# Patient Record
Sex: MALE | Race: WHITE | Employment: OTHER | URBAN - METROPOLITAN AREA
[De-identification: names, ages, dates, MRNs, and addresses within clinical notes are randomized per-mention and may not be internally consistent; named-entity substitution may affect disease eponyms.]

---

## 2017-01-10 ENCOUNTER — OFFICE VISIT (OUTPATIENT)
Dept: INTERNAL MEDICINE CLINIC | Age: 77
End: 2017-01-10

## 2017-01-10 VITALS
HEART RATE: 66 BPM | WEIGHT: 191.2 LBS | RESPIRATION RATE: 16 BRPM | DIASTOLIC BLOOD PRESSURE: 70 MMHG | BODY MASS INDEX: 28.32 KG/M2 | HEIGHT: 69 IN | SYSTOLIC BLOOD PRESSURE: 120 MMHG

## 2017-01-10 DIAGNOSIS — E78.00 PURE HYPERCHOLESTEROLEMIA: ICD-10-CM

## 2017-01-10 DIAGNOSIS — R73.9 HYPERGLYCEMIA: ICD-10-CM

## 2017-01-10 DIAGNOSIS — I25.84 CORONARY ARTERY DISEASE DUE TO CALCIFIED CORONARY LESION: Primary | ICD-10-CM

## 2017-01-10 DIAGNOSIS — I25.10 CORONARY ARTERY DISEASE DUE TO CALCIFIED CORONARY LESION: Primary | ICD-10-CM

## 2017-01-10 LAB
A/G RATIO: 1.7 (ref 1.1–2.2)
ALBUMIN SERPL-MCNC: 4.5 G/DL (ref 3.4–5)
ALP BLD-CCNC: 65 U/L (ref 40–129)
ALT SERPL-CCNC: 10 U/L (ref 10–40)
ANION GAP SERPL CALCULATED.3IONS-SCNC: 13 MMOL/L (ref 3–16)
AST SERPL-CCNC: 16 U/L (ref 15–37)
BILIRUB SERPL-MCNC: 0.5 MG/DL (ref 0–1)
BUN BLDV-MCNC: 18 MG/DL (ref 7–20)
CALCIUM SERPL-MCNC: 10.1 MG/DL (ref 8.3–10.6)
CHLORIDE BLD-SCNC: 102 MMOL/L (ref 99–110)
CO2: 26 MMOL/L (ref 21–32)
CREAT SERPL-MCNC: 0.8 MG/DL (ref 0.8–1.3)
GFR AFRICAN AMERICAN: >60
GFR NON-AFRICAN AMERICAN: >60
GLOBULIN: 2.7 G/DL
GLUCOSE BLD-MCNC: 112 MG/DL (ref 70–99)
LDL CHOLESTEROL DIRECT: 73 MG/DL
POTASSIUM SERPL-SCNC: 4.6 MMOL/L (ref 3.5–5.1)
SODIUM BLD-SCNC: 141 MMOL/L (ref 136–145)
TOTAL PROTEIN: 7.2 G/DL (ref 6.4–8.2)

## 2017-01-10 PROCEDURE — 99213 OFFICE O/P EST LOW 20 MIN: CPT | Performed by: INTERNAL MEDICINE

## 2017-01-10 ASSESSMENT — ENCOUNTER SYMPTOMS
WHEEZING: 0
SHORTNESS OF BREATH: 0

## 2017-04-24 ENCOUNTER — OFFICE VISIT (OUTPATIENT)
Dept: INTERNAL MEDICINE CLINIC | Age: 77
End: 2017-04-24

## 2017-04-24 VITALS
BODY MASS INDEX: 28.68 KG/M2 | WEIGHT: 193.6 LBS | HEART RATE: 74 BPM | RESPIRATION RATE: 16 BRPM | DIASTOLIC BLOOD PRESSURE: 70 MMHG | HEIGHT: 69 IN | SYSTOLIC BLOOD PRESSURE: 130 MMHG

## 2017-04-24 DIAGNOSIS — R41.3 MEMORY LOSS: Primary | ICD-10-CM

## 2017-04-24 DIAGNOSIS — E78.00 PURE HYPERCHOLESTEROLEMIA: ICD-10-CM

## 2017-04-24 LAB
TSH SERPL DL<=0.05 MIU/L-ACNC: 2.06 UIU/ML (ref 0.27–4.2)
VITAMIN B-12: 517 PG/ML (ref 211–911)

## 2017-04-24 PROCEDURE — 4040F PNEUMOC VAC/ADMIN/RCVD: CPT | Performed by: INTERNAL MEDICINE

## 2017-04-24 PROCEDURE — G8598 ASA/ANTIPLAT THER USED: HCPCS | Performed by: INTERNAL MEDICINE

## 2017-04-24 PROCEDURE — 1036F TOBACCO NON-USER: CPT | Performed by: INTERNAL MEDICINE

## 2017-04-24 PROCEDURE — G8427 DOCREV CUR MEDS BY ELIG CLIN: HCPCS | Performed by: INTERNAL MEDICINE

## 2017-04-24 PROCEDURE — G8420 CALC BMI NORM PARAMETERS: HCPCS | Performed by: INTERNAL MEDICINE

## 2017-04-24 PROCEDURE — 99213 OFFICE O/P EST LOW 20 MIN: CPT | Performed by: INTERNAL MEDICINE

## 2017-04-24 PROCEDURE — 3017F COLORECTAL CA SCREEN DOC REV: CPT | Performed by: INTERNAL MEDICINE

## 2017-04-24 PROCEDURE — 1123F ACP DISCUSS/DSCN MKR DOCD: CPT | Performed by: INTERNAL MEDICINE

## 2017-04-24 RX ORDER — ROSUVASTATIN CALCIUM 20 MG/1
20 TABLET, COATED ORAL NIGHTLY
Qty: 30 TABLET | Refills: 3 | Status: SHIPPED | OUTPATIENT
Start: 2017-04-24 | End: 2017-08-18 | Stop reason: SDUPTHER

## 2017-04-24 RX ORDER — ROSUVASTATIN CALCIUM 20 MG/1
TABLET, COATED ORAL
Qty: 90 TABLET | Refills: 3 | OUTPATIENT
Start: 2017-04-24

## 2017-05-04 ENCOUNTER — HOSPITAL ENCOUNTER (OUTPATIENT)
Dept: MRI IMAGING | Age: 77
Discharge: HOME | End: 2017-05-04
Attending: INTERNAL MEDICINE | Admitting: INTERNAL MEDICINE

## 2017-05-04 DIAGNOSIS — R41.3 MEMORY LOSS: ICD-10-CM

## 2017-05-04 DIAGNOSIS — R41.3 OTHER AMNESIA: ICD-10-CM

## 2017-05-09 ENCOUNTER — OFFICE VISIT (OUTPATIENT)
Dept: INTERNAL MEDICINE CLINIC | Age: 77
End: 2017-05-09

## 2017-05-09 VITALS
HEIGHT: 69 IN | WEIGHT: 191.2 LBS | RESPIRATION RATE: 16 BRPM | HEART RATE: 70 BPM | DIASTOLIC BLOOD PRESSURE: 70 MMHG | SYSTOLIC BLOOD PRESSURE: 130 MMHG | BODY MASS INDEX: 28.32 KG/M2

## 2017-05-09 DIAGNOSIS — F03.A0 MILD DEMENTIA: Primary | ICD-10-CM

## 2017-05-09 PROCEDURE — 1123F ACP DISCUSS/DSCN MKR DOCD: CPT | Performed by: INTERNAL MEDICINE

## 2017-05-09 PROCEDURE — G8427 DOCREV CUR MEDS BY ELIG CLIN: HCPCS | Performed by: INTERNAL MEDICINE

## 2017-05-09 PROCEDURE — G8420 CALC BMI NORM PARAMETERS: HCPCS | Performed by: INTERNAL MEDICINE

## 2017-05-09 PROCEDURE — 4040F PNEUMOC VAC/ADMIN/RCVD: CPT | Performed by: INTERNAL MEDICINE

## 2017-05-09 PROCEDURE — 3017F COLORECTAL CA SCREEN DOC REV: CPT | Performed by: INTERNAL MEDICINE

## 2017-05-09 PROCEDURE — 99213 OFFICE O/P EST LOW 20 MIN: CPT | Performed by: INTERNAL MEDICINE

## 2017-05-09 PROCEDURE — 1036F TOBACCO NON-USER: CPT | Performed by: INTERNAL MEDICINE

## 2017-05-09 PROCEDURE — G8598 ASA/ANTIPLAT THER USED: HCPCS | Performed by: INTERNAL MEDICINE

## 2017-05-09 RX ORDER — DONEPEZIL HYDROCHLORIDE 5 MG/1
5 TABLET, FILM COATED ORAL NIGHTLY
Qty: 30 TABLET | Refills: 1 | Status: SHIPPED | OUTPATIENT
Start: 2017-05-09 | End: 2017-06-09 | Stop reason: SDUPTHER

## 2017-06-09 ENCOUNTER — OFFICE VISIT (OUTPATIENT)
Dept: INTERNAL MEDICINE CLINIC | Age: 77
End: 2017-06-09

## 2017-06-09 VITALS
SYSTOLIC BLOOD PRESSURE: 104 MMHG | RESPIRATION RATE: 16 BRPM | HEART RATE: 66 BPM | WEIGHT: 190.6 LBS | HEIGHT: 69 IN | DIASTOLIC BLOOD PRESSURE: 60 MMHG | BODY MASS INDEX: 28.23 KG/M2

## 2017-06-09 DIAGNOSIS — F02.80 LATE ONSET ALZHEIMER'S DISEASE WITHOUT BEHAVIORAL DISTURBANCE (HCC): ICD-10-CM

## 2017-06-09 DIAGNOSIS — G30.1 LATE ONSET ALZHEIMER'S DISEASE WITHOUT BEHAVIORAL DISTURBANCE (HCC): ICD-10-CM

## 2017-06-09 PROCEDURE — 1123F ACP DISCUSS/DSCN MKR DOCD: CPT | Performed by: INTERNAL MEDICINE

## 2017-06-09 PROCEDURE — 99213 OFFICE O/P EST LOW 20 MIN: CPT | Performed by: INTERNAL MEDICINE

## 2017-06-09 PROCEDURE — 1036F TOBACCO NON-USER: CPT | Performed by: INTERNAL MEDICINE

## 2017-06-09 PROCEDURE — G8427 DOCREV CUR MEDS BY ELIG CLIN: HCPCS | Performed by: INTERNAL MEDICINE

## 2017-06-09 PROCEDURE — 3017F COLORECTAL CA SCREEN DOC REV: CPT | Performed by: INTERNAL MEDICINE

## 2017-06-09 PROCEDURE — G8598 ASA/ANTIPLAT THER USED: HCPCS | Performed by: INTERNAL MEDICINE

## 2017-06-09 PROCEDURE — G8420 CALC BMI NORM PARAMETERS: HCPCS | Performed by: INTERNAL MEDICINE

## 2017-06-09 PROCEDURE — 4040F PNEUMOC VAC/ADMIN/RCVD: CPT | Performed by: INTERNAL MEDICINE

## 2017-06-09 RX ORDER — DONEPEZIL HYDROCHLORIDE 10 MG/1
10 TABLET, FILM COATED ORAL NIGHTLY
Qty: 30 TABLET | Refills: 2 | Status: SHIPPED | OUTPATIENT
Start: 2017-06-09 | End: 2017-09-25 | Stop reason: SDUPTHER

## 2017-07-10 ENCOUNTER — OFFICE VISIT (OUTPATIENT)
Dept: INTERNAL MEDICINE CLINIC | Age: 77
End: 2017-07-10

## 2017-07-10 VITALS
DIASTOLIC BLOOD PRESSURE: 82 MMHG | HEIGHT: 69 IN | RESPIRATION RATE: 16 BRPM | WEIGHT: 190 LBS | BODY MASS INDEX: 28.14 KG/M2 | HEART RATE: 62 BPM | SYSTOLIC BLOOD PRESSURE: 132 MMHG

## 2017-07-10 DIAGNOSIS — I25.84 CORONARY ARTERY DISEASE DUE TO CALCIFIED CORONARY LESION: ICD-10-CM

## 2017-07-10 DIAGNOSIS — E78.00 PURE HYPERCHOLESTEROLEMIA: ICD-10-CM

## 2017-07-10 DIAGNOSIS — R73.9 HYPERGLYCEMIA: ICD-10-CM

## 2017-07-10 DIAGNOSIS — Z23 NEED FOR TETANUS BOOSTER: ICD-10-CM

## 2017-07-10 DIAGNOSIS — G30.1 LATE ONSET ALZHEIMER'S DISEASE WITHOUT BEHAVIORAL DISTURBANCE (HCC): Primary | ICD-10-CM

## 2017-07-10 DIAGNOSIS — F02.80 LATE ONSET ALZHEIMER'S DISEASE WITHOUT BEHAVIORAL DISTURBANCE (HCC): Primary | ICD-10-CM

## 2017-07-10 DIAGNOSIS — I25.10 CORONARY ARTERY DISEASE DUE TO CALCIFIED CORONARY LESION: ICD-10-CM

## 2017-07-10 LAB
A/G RATIO: 1.8 (ref 1.1–2.2)
ALBUMIN SERPL-MCNC: 4.5 G/DL (ref 3.4–5)
ALP BLD-CCNC: 75 U/L (ref 40–129)
ALT SERPL-CCNC: 15 U/L (ref 10–40)
ANION GAP SERPL CALCULATED.3IONS-SCNC: 14 MMOL/L (ref 3–16)
AST SERPL-CCNC: 20 U/L (ref 15–37)
BILIRUB SERPL-MCNC: 0.6 MG/DL (ref 0–1)
BUN BLDV-MCNC: 19 MG/DL (ref 7–20)
CALCIUM SERPL-MCNC: 10.2 MG/DL (ref 8.3–10.6)
CHLORIDE BLD-SCNC: 103 MMOL/L (ref 99–110)
CO2: 24 MMOL/L (ref 21–32)
CREAT SERPL-MCNC: 0.8 MG/DL (ref 0.8–1.3)
GFR AFRICAN AMERICAN: >60
GFR NON-AFRICAN AMERICAN: >60
GLOBULIN: 2.5 G/DL
GLUCOSE BLD-MCNC: 122 MG/DL (ref 70–99)
LDL CHOLESTEROL DIRECT: 62 MG/DL
POTASSIUM SERPL-SCNC: 4.7 MMOL/L (ref 3.5–5.1)
SODIUM BLD-SCNC: 141 MMOL/L (ref 136–145)
TOTAL PROTEIN: 7 G/DL (ref 6.4–8.2)

## 2017-07-10 PROCEDURE — 99214 OFFICE O/P EST MOD 30 MIN: CPT | Performed by: INTERNAL MEDICINE

## 2017-07-10 PROCEDURE — G8427 DOCREV CUR MEDS BY ELIG CLIN: HCPCS | Performed by: INTERNAL MEDICINE

## 2017-07-10 PROCEDURE — 3017F COLORECTAL CA SCREEN DOC REV: CPT | Performed by: INTERNAL MEDICINE

## 2017-07-10 PROCEDURE — G8419 CALC BMI OUT NRM PARAM NOF/U: HCPCS | Performed by: INTERNAL MEDICINE

## 2017-07-10 PROCEDURE — 1036F TOBACCO NON-USER: CPT | Performed by: INTERNAL MEDICINE

## 2017-07-10 PROCEDURE — 90471 IMMUNIZATION ADMIN: CPT | Performed by: INTERNAL MEDICINE

## 2017-07-10 PROCEDURE — G8598 ASA/ANTIPLAT THER USED: HCPCS | Performed by: INTERNAL MEDICINE

## 2017-07-10 PROCEDURE — 1123F ACP DISCUSS/DSCN MKR DOCD: CPT | Performed by: INTERNAL MEDICINE

## 2017-07-10 PROCEDURE — 90715 TDAP VACCINE 7 YRS/> IM: CPT | Performed by: INTERNAL MEDICINE

## 2017-07-10 PROCEDURE — 4040F PNEUMOC VAC/ADMIN/RCVD: CPT | Performed by: INTERNAL MEDICINE

## 2017-07-10 RX ORDER — MEMANTINE HYDROCHLORIDE 5 MG/1
5 TABLET ORAL 2 TIMES DAILY
Qty: 60 TABLET | Refills: 3 | Status: SHIPPED | OUTPATIENT
Start: 2017-07-10 | End: 2017-09-25 | Stop reason: SDUPTHER

## 2017-07-10 ASSESSMENT — PATIENT HEALTH QUESTIONNAIRE - PHQ9
SUM OF ALL RESPONSES TO PHQ9 QUESTIONS 1 & 2: 0
SUM OF ALL RESPONSES TO PHQ QUESTIONS 1-9: 0
2. FEELING DOWN, DEPRESSED OR HOPELESS: 0
1. LITTLE INTEREST OR PLEASURE IN DOING THINGS: 0

## 2017-08-18 RX ORDER — DONEPEZIL HYDROCHLORIDE 5 MG/1
TABLET, FILM COATED ORAL
Qty: 30 TABLET | Refills: 0 | Status: SHIPPED | OUTPATIENT
Start: 2017-08-18 | End: 2017-09-25

## 2017-08-20 RX ORDER — ROSUVASTATIN CALCIUM 20 MG/1
TABLET, COATED ORAL
Qty: 30 TABLET | Refills: 5 | Status: SHIPPED | OUTPATIENT
Start: 2017-08-20 | End: 2017-09-25 | Stop reason: SDUPTHER

## 2017-09-02 ENCOUNTER — HOSPITAL ENCOUNTER (OUTPATIENT)
Dept: OTHER | Age: 77
Discharge: HOME | End: 2017-09-02
Attending: UROLOGY | Admitting: UROLOGY

## 2017-09-02 LAB
ANION GAP SERPL CALCULATED.3IONS-SCNC: 13 MMOL/L (ref 3–16)
BUN BLDV-MCNC: 14 MG/DL (ref 7–20)
CALCIUM SERPL-MCNC: 10 MG/DL (ref 8.3–10.6)
CHLORIDE BLD-SCNC: 102 MMOL/L (ref 99–110)
CO2: 25 MMOL/L (ref 21–32)
CREAT SERPL-MCNC: 0.7 MG/DL (ref 0.8–1.3)
GFR AFRICAN AMERICAN: >60
GFR NON-AFRICAN AMERICAN: >60
GLUCOSE BLD-MCNC: 95 MG/DL (ref 70–99)
POTASSIUM SERPL-SCNC: 3.9 MMOL/L (ref 3.5–5.1)
PROSTATE SPECIFIC ANTIGEN: <0.01 NG/ML (ref 0–4)
SODIUM BLD-SCNC: 140 MMOL/L (ref 136–145)

## 2017-09-25 ENCOUNTER — OFFICE VISIT (OUTPATIENT)
Dept: INTERNAL MEDICINE CLINIC | Age: 77
End: 2017-09-25

## 2017-09-25 VITALS
DIASTOLIC BLOOD PRESSURE: 62 MMHG | BODY MASS INDEX: 28.41 KG/M2 | WEIGHT: 191.8 LBS | HEIGHT: 69 IN | SYSTOLIC BLOOD PRESSURE: 120 MMHG | RESPIRATION RATE: 16 BRPM | HEART RATE: 68 BPM

## 2017-09-25 DIAGNOSIS — G30.1 LATE ONSET ALZHEIMER'S DISEASE WITHOUT BEHAVIORAL DISTURBANCE (HCC): Primary | ICD-10-CM

## 2017-09-25 DIAGNOSIS — F02.80 LATE ONSET ALZHEIMER'S DISEASE WITHOUT BEHAVIORAL DISTURBANCE (HCC): Primary | ICD-10-CM

## 2017-09-25 DIAGNOSIS — R53.83 FATIGUE, UNSPECIFIED TYPE: ICD-10-CM

## 2017-09-25 PROCEDURE — G8427 DOCREV CUR MEDS BY ELIG CLIN: HCPCS | Performed by: INTERNAL MEDICINE

## 2017-09-25 PROCEDURE — 1036F TOBACCO NON-USER: CPT | Performed by: INTERNAL MEDICINE

## 2017-09-25 PROCEDURE — 4040F PNEUMOC VAC/ADMIN/RCVD: CPT | Performed by: INTERNAL MEDICINE

## 2017-09-25 PROCEDURE — G8417 CALC BMI ABV UP PARAM F/U: HCPCS | Performed by: INTERNAL MEDICINE

## 2017-09-25 PROCEDURE — 99213 OFFICE O/P EST LOW 20 MIN: CPT | Performed by: INTERNAL MEDICINE

## 2017-09-25 PROCEDURE — 1123F ACP DISCUSS/DSCN MKR DOCD: CPT | Performed by: INTERNAL MEDICINE

## 2017-09-25 PROCEDURE — G8598 ASA/ANTIPLAT THER USED: HCPCS | Performed by: INTERNAL MEDICINE

## 2017-09-25 RX ORDER — ROSUVASTATIN CALCIUM 20 MG/1
TABLET, COATED ORAL
Qty: 90 TABLET | Refills: 3 | Status: SHIPPED | OUTPATIENT
Start: 2017-09-25 | End: 2018-12-07 | Stop reason: SDUPTHER

## 2017-09-25 RX ORDER — DONEPEZIL HYDROCHLORIDE 10 MG/1
10 TABLET, FILM COATED ORAL NIGHTLY
Qty: 90 TABLET | Refills: 3 | Status: SHIPPED | OUTPATIENT
Start: 2017-09-25 | End: 2018-01-09

## 2017-09-25 RX ORDER — MEMANTINE HYDROCHLORIDE 10 MG/1
10 TABLET ORAL 2 TIMES DAILY
Qty: 60 TABLET | Refills: 1 | Status: SHIPPED | OUTPATIENT
Start: 2017-09-25 | End: 2017-10-10 | Stop reason: SDUPTHER

## 2017-10-10 ENCOUNTER — TELEPHONE (OUTPATIENT)
Dept: INTERNAL MEDICINE CLINIC | Age: 77
End: 2017-10-10

## 2017-10-10 ENCOUNTER — OFFICE VISIT (OUTPATIENT)
Dept: INTERNAL MEDICINE CLINIC | Age: 77
End: 2017-10-10

## 2017-10-10 VITALS
RESPIRATION RATE: 16 BRPM | HEART RATE: 70 BPM | SYSTOLIC BLOOD PRESSURE: 140 MMHG | DIASTOLIC BLOOD PRESSURE: 70 MMHG | TEMPERATURE: 98.1 F | HEIGHT: 69 IN | BODY MASS INDEX: 27.93 KG/M2 | WEIGHT: 188.6 LBS

## 2017-10-10 DIAGNOSIS — J40 BRONCHITIS: Primary | ICD-10-CM

## 2017-10-10 PROCEDURE — G8598 ASA/ANTIPLAT THER USED: HCPCS | Performed by: INTERNAL MEDICINE

## 2017-10-10 PROCEDURE — 1123F ACP DISCUSS/DSCN MKR DOCD: CPT | Performed by: INTERNAL MEDICINE

## 2017-10-10 PROCEDURE — G8417 CALC BMI ABV UP PARAM F/U: HCPCS | Performed by: INTERNAL MEDICINE

## 2017-10-10 PROCEDURE — G8427 DOCREV CUR MEDS BY ELIG CLIN: HCPCS | Performed by: INTERNAL MEDICINE

## 2017-10-10 PROCEDURE — 99213 OFFICE O/P EST LOW 20 MIN: CPT | Performed by: INTERNAL MEDICINE

## 2017-10-10 PROCEDURE — G8484 FLU IMMUNIZE NO ADMIN: HCPCS | Performed by: INTERNAL MEDICINE

## 2017-10-10 PROCEDURE — 4040F PNEUMOC VAC/ADMIN/RCVD: CPT | Performed by: INTERNAL MEDICINE

## 2017-10-10 PROCEDURE — 1036F TOBACCO NON-USER: CPT | Performed by: INTERNAL MEDICINE

## 2017-10-10 RX ORDER — DOXYCYCLINE HYCLATE 100 MG
100 TABLET ORAL 2 TIMES DAILY
Qty: 14 TABLET | Refills: 0 | Status: SHIPPED | OUTPATIENT
Start: 2017-10-10 | End: 2017-10-17

## 2017-10-10 RX ORDER — MEMANTINE HYDROCHLORIDE 5 MG/1
5 TABLET ORAL 2 TIMES DAILY
Qty: 60 TABLET | Refills: 5 | Status: SHIPPED | OUTPATIENT
Start: 2017-10-10 | End: 2018-01-09

## 2018-01-09 ENCOUNTER — OFFICE VISIT (OUTPATIENT)
Dept: INTERNAL MEDICINE CLINIC | Age: 78
End: 2018-01-09

## 2018-01-09 VITALS
HEIGHT: 69 IN | BODY MASS INDEX: 29 KG/M2 | DIASTOLIC BLOOD PRESSURE: 80 MMHG | SYSTOLIC BLOOD PRESSURE: 132 MMHG | HEART RATE: 66 BPM | WEIGHT: 195.8 LBS | RESPIRATION RATE: 16 BRPM

## 2018-01-09 DIAGNOSIS — I25.10 CORONARY ARTERY DISEASE DUE TO CALCIFIED CORONARY LESION: Primary | ICD-10-CM

## 2018-01-09 DIAGNOSIS — I25.84 CORONARY ARTERY DISEASE DUE TO CALCIFIED CORONARY LESION: Primary | ICD-10-CM

## 2018-01-09 DIAGNOSIS — E78.00 PURE HYPERCHOLESTEROLEMIA: ICD-10-CM

## 2018-01-09 DIAGNOSIS — G30.1 LATE ONSET ALZHEIMER'S DISEASE WITHOUT BEHAVIORAL DISTURBANCE (HCC): ICD-10-CM

## 2018-01-09 DIAGNOSIS — F02.80 LATE ONSET ALZHEIMER'S DISEASE WITHOUT BEHAVIORAL DISTURBANCE (HCC): ICD-10-CM

## 2018-01-09 LAB
A/G RATIO: 1.5 (ref 1.1–2.2)
ALBUMIN SERPL-MCNC: 4.1 G/DL (ref 3.4–5)
ALP BLD-CCNC: 68 U/L (ref 40–129)
ALT SERPL-CCNC: 26 U/L (ref 10–40)
ANION GAP SERPL CALCULATED.3IONS-SCNC: 14 MMOL/L (ref 3–16)
AST SERPL-CCNC: 25 U/L (ref 15–37)
BASOPHILS ABSOLUTE: 0 K/UL (ref 0–0.2)
BASOPHILS RELATIVE PERCENT: 0.7 %
BILIRUB SERPL-MCNC: 0.3 MG/DL (ref 0–1)
BUN BLDV-MCNC: 20 MG/DL (ref 7–20)
CALCIUM SERPL-MCNC: 10 MG/DL (ref 8.3–10.6)
CHLORIDE BLD-SCNC: 102 MMOL/L (ref 99–110)
CHOLESTEROL, TOTAL: 102 MG/DL (ref 0–199)
CO2: 29 MMOL/L (ref 21–32)
CREAT SERPL-MCNC: 0.7 MG/DL (ref 0.8–1.3)
EOSINOPHILS ABSOLUTE: 0.4 K/UL (ref 0–0.6)
EOSINOPHILS RELATIVE PERCENT: 5.7 %
GFR AFRICAN AMERICAN: >60
GFR NON-AFRICAN AMERICAN: >60
GLOBULIN: 2.7 G/DL
GLUCOSE BLD-MCNC: 105 MG/DL (ref 70–99)
HCT VFR BLD CALC: 39.9 % (ref 40.5–52.5)
HDLC SERPL-MCNC: 40 MG/DL (ref 40–60)
HEMOGLOBIN: 13.6 G/DL (ref 13.5–17.5)
LDL CHOLESTEROL CALCULATED: 43 MG/DL
LYMPHOCYTES ABSOLUTE: 1.7 K/UL (ref 1–5.1)
LYMPHOCYTES RELATIVE PERCENT: 26.3 %
MCH RBC QN AUTO: 29.4 PG (ref 26–34)
MCHC RBC AUTO-ENTMCNC: 34 G/DL (ref 31–36)
MCV RBC AUTO: 86.6 FL (ref 80–100)
MONOCYTES ABSOLUTE: 0.4 K/UL (ref 0–1.3)
MONOCYTES RELATIVE PERCENT: 6.9 %
NEUTROPHILS ABSOLUTE: 3.9 K/UL (ref 1.7–7.7)
NEUTROPHILS RELATIVE PERCENT: 60.4 %
PDW BLD-RTO: 12.6 % (ref 12.4–15.4)
PLATELET # BLD: 238 K/UL (ref 135–450)
PMV BLD AUTO: 8.1 FL (ref 5–10.5)
POTASSIUM SERPL-SCNC: 4.5 MMOL/L (ref 3.5–5.1)
RBC # BLD: 4.61 M/UL (ref 4.2–5.9)
SODIUM BLD-SCNC: 145 MMOL/L (ref 136–145)
TOTAL PROTEIN: 6.8 G/DL (ref 6.4–8.2)
TRIGL SERPL-MCNC: 96 MG/DL (ref 0–150)
TSH SERPL DL<=0.05 MIU/L-ACNC: 1.43 UIU/ML (ref 0.27–4.2)
VLDLC SERPL CALC-MCNC: 19 MG/DL
WBC # BLD: 6.4 K/UL (ref 4–11)

## 2018-01-09 PROCEDURE — 1123F ACP DISCUSS/DSCN MKR DOCD: CPT | Performed by: INTERNAL MEDICINE

## 2018-01-09 PROCEDURE — 1036F TOBACCO NON-USER: CPT | Performed by: INTERNAL MEDICINE

## 2018-01-09 PROCEDURE — G8598 ASA/ANTIPLAT THER USED: HCPCS | Performed by: INTERNAL MEDICINE

## 2018-01-09 PROCEDURE — 99214 OFFICE O/P EST MOD 30 MIN: CPT | Performed by: INTERNAL MEDICINE

## 2018-01-09 PROCEDURE — 4040F PNEUMOC VAC/ADMIN/RCVD: CPT | Performed by: INTERNAL MEDICINE

## 2018-01-09 PROCEDURE — G8417 CALC BMI ABV UP PARAM F/U: HCPCS | Performed by: INTERNAL MEDICINE

## 2018-01-09 PROCEDURE — G8427 DOCREV CUR MEDS BY ELIG CLIN: HCPCS | Performed by: INTERNAL MEDICINE

## 2018-01-09 PROCEDURE — G8484 FLU IMMUNIZE NO ADMIN: HCPCS | Performed by: INTERNAL MEDICINE

## 2018-07-10 ENCOUNTER — OFFICE VISIT (OUTPATIENT)
Dept: INTERNAL MEDICINE CLINIC | Age: 78
End: 2018-07-10

## 2018-07-10 VITALS
RESPIRATION RATE: 16 BRPM | HEIGHT: 69 IN | HEART RATE: 60 BPM | SYSTOLIC BLOOD PRESSURE: 140 MMHG | WEIGHT: 195 LBS | BODY MASS INDEX: 28.88 KG/M2 | DIASTOLIC BLOOD PRESSURE: 82 MMHG

## 2018-07-10 DIAGNOSIS — E78.00 PURE HYPERCHOLESTEROLEMIA: ICD-10-CM

## 2018-07-10 DIAGNOSIS — F02.80 LATE ONSET ALZHEIMER'S DISEASE WITHOUT BEHAVIORAL DISTURBANCE (HCC): ICD-10-CM

## 2018-07-10 DIAGNOSIS — I25.84 CORONARY ARTERY DISEASE DUE TO CALCIFIED CORONARY LESION: Primary | ICD-10-CM

## 2018-07-10 DIAGNOSIS — I25.10 CORONARY ARTERY DISEASE DUE TO CALCIFIED CORONARY LESION: Primary | ICD-10-CM

## 2018-07-10 DIAGNOSIS — G30.1 LATE ONSET ALZHEIMER'S DISEASE WITHOUT BEHAVIORAL DISTURBANCE (HCC): ICD-10-CM

## 2018-07-10 LAB
A/G RATIO: 1.6 (ref 1.1–2.2)
ALBUMIN SERPL-MCNC: 4.4 G/DL (ref 3.4–5)
ALP BLD-CCNC: 68 U/L (ref 40–129)
ALT SERPL-CCNC: 15 U/L (ref 10–40)
ANION GAP SERPL CALCULATED.3IONS-SCNC: 9 MMOL/L (ref 3–16)
AST SERPL-CCNC: 19 U/L (ref 15–37)
BILIRUB SERPL-MCNC: 0.5 MG/DL (ref 0–1)
BUN BLDV-MCNC: 13 MG/DL (ref 7–20)
CALCIUM SERPL-MCNC: 10.1 MG/DL (ref 8.3–10.6)
CHLORIDE BLD-SCNC: 104 MMOL/L (ref 99–110)
CO2: 29 MMOL/L (ref 21–32)
CREAT SERPL-MCNC: 0.8 MG/DL (ref 0.8–1.3)
GFR AFRICAN AMERICAN: >60
GFR NON-AFRICAN AMERICAN: >60
GLOBULIN: 2.7 G/DL
GLUCOSE BLD-MCNC: 108 MG/DL (ref 70–99)
LDL CHOLESTEROL DIRECT: 60 MG/DL
POTASSIUM SERPL-SCNC: 4.4 MMOL/L (ref 3.5–5.1)
SODIUM BLD-SCNC: 142 MMOL/L (ref 136–145)
TOTAL PROTEIN: 7.1 G/DL (ref 6.4–8.2)

## 2018-07-10 PROCEDURE — 99214 OFFICE O/P EST MOD 30 MIN: CPT | Performed by: INTERNAL MEDICINE

## 2018-07-10 PROCEDURE — G8598 ASA/ANTIPLAT THER USED: HCPCS | Performed by: INTERNAL MEDICINE

## 2018-07-10 PROCEDURE — G8427 DOCREV CUR MEDS BY ELIG CLIN: HCPCS | Performed by: INTERNAL MEDICINE

## 2018-07-10 PROCEDURE — 1123F ACP DISCUSS/DSCN MKR DOCD: CPT | Performed by: INTERNAL MEDICINE

## 2018-07-10 PROCEDURE — 1036F TOBACCO NON-USER: CPT | Performed by: INTERNAL MEDICINE

## 2018-07-10 PROCEDURE — 4040F PNEUMOC VAC/ADMIN/RCVD: CPT | Performed by: INTERNAL MEDICINE

## 2018-07-10 PROCEDURE — G8417 CALC BMI ABV UP PARAM F/U: HCPCS | Performed by: INTERNAL MEDICINE

## 2018-07-10 RX ORDER — CITALOPRAM 20 MG/1
20 TABLET ORAL DAILY
COMMUNITY
Start: 2018-06-11

## 2018-07-10 ASSESSMENT — PATIENT HEALTH QUESTIONNAIRE - PHQ9
SUM OF ALL RESPONSES TO PHQ9 QUESTIONS 1 & 2: 0
1. LITTLE INTEREST OR PLEASURE IN DOING THINGS: 0
SUM OF ALL RESPONSES TO PHQ QUESTIONS 1-9: 0
2. FEELING DOWN, DEPRESSED OR HOPELESS: 0

## 2018-07-10 NOTE — PROGRESS NOTES
occasionally (12pack on weekend)         Family History   Problem Relation Age of Onset    Lung Cancer Father     Cancer Father         Lung    Diabetes Brother     Cancer Brother         leukemia    Cancer Brother 47        Leukemia    Breast Cancer Sister     Cancer Sister         Breast         Vitals:    07/10/18 1039 07/10/18 1105   BP: 130/60 (!) 140/82   Site: Left Arm    Position: Sitting    Cuff Size: Medium Adult    Pulse: 60    Resp: 16    Weight: 195 lb (88.5 kg)    Height: 5' 9\" (1.753 m)         Objective:   Physical Exam   Constitutional: He appears well-developed and well-nourished. Neck: Normal range of motion. Neck supple. No thyromegaly present. Cardiovascular: Normal rate, regular rhythm and normal heart sounds. Pulmonary/Chest: Effort normal and breath sounds normal. He has no wheezes. He has no rales. Musculoskeletal: He exhibits no edema. Lymphadenopathy:     He has no cervical adenopathy. Vitals reviewed. Assessment:      1. Coronary artery disease due to calcified coronary lesion  - asymptomatic, no changes. Follow with asa   2. Pure hypercholesterolemia  - repeat the lipids today, she is stable and tolerating well   3.  Late onset Alzheimer's disease without behavioral disturbance  - overall stable on this regimen, no changes needed          Plan:      F/u in 6 monhs

## 2019-06-18 ENCOUNTER — HOSPITAL ENCOUNTER (OUTPATIENT)
Dept: ULTRASOUND IMAGING | Age: 79
Discharge: HOME | End: 2019-06-18
Payer: MEDICARE

## 2019-06-18 ENCOUNTER — HOSPITAL ENCOUNTER (OUTPATIENT)
Dept: CT IMAGING | Age: 79
Discharge: HOME | End: 2019-06-18
Payer: MEDICARE

## 2019-06-18 DIAGNOSIS — N13.1 HYDRONEPHROSIS WITH URETERAL STRICTURE, NOT ELSEWHERE CLASSIFIED: ICD-10-CM

## 2019-06-18 DIAGNOSIS — Z08 ENCOUNTER FOR FOLLOW-UP SURVEILLANCE OF PROSTATE CANCER: ICD-10-CM

## 2019-06-18 DIAGNOSIS — Z85.46 ENCOUNTER FOR FOLLOW-UP SURVEILLANCE OF PROSTATE CANCER: ICD-10-CM

## 2019-06-18 DIAGNOSIS — N43.3 ACQUIRED HYDROCELE: ICD-10-CM

## 2019-06-18 DIAGNOSIS — Z85.51 ENCOUNTER FOR FOLLOW-UP SURVEILLANCE OF BLADDER CANCER: ICD-10-CM

## 2019-06-18 DIAGNOSIS — Z08 ENCOUNTER FOR FOLLOW-UP SURVEILLANCE OF BLADDER CANCER: ICD-10-CM

## 2019-06-18 DIAGNOSIS — N43.3 HYDROCELE, UNSPECIFIED HYDROCELE TYPE: ICD-10-CM

## 2019-06-18 LAB
A/G RATIO: 1.2 (ref 1.1–2.2)
ALBUMIN SERPL-MCNC: 4.1 G/DL (ref 3.4–5)
ALP BLD-CCNC: 63 U/L (ref 40–129)
ALT SERPL-CCNC: 13 U/L (ref 10–40)
ANION GAP SERPL CALCULATED.3IONS-SCNC: 10 MMOL/L (ref 3–16)
AST SERPL-CCNC: 19 U/L (ref 15–37)
BILIRUB SERPL-MCNC: 0.7 MG/DL (ref 0–1)
BUN BLDV-MCNC: 14 MG/DL (ref 7–20)
CALCIUM SERPL-MCNC: 10.1 MG/DL (ref 8.3–10.6)
CHLORIDE BLD-SCNC: 101 MMOL/L (ref 99–110)
CO2: 26 MMOL/L (ref 21–32)
CREAT SERPL-MCNC: 0.8 MG/DL (ref 0.8–1.3)
GFR AFRICAN AMERICAN: >60
GFR NON-AFRICAN AMERICAN: >60
GLOBULIN: 3.3 G/DL
GLUCOSE BLD-MCNC: 123 MG/DL (ref 70–99)
HCT VFR BLD CALC: 43.8 % (ref 40.5–52.5)
HEMOGLOBIN: 14.5 G/DL (ref 13.5–17.5)
MCH RBC QN AUTO: 28.9 PG (ref 26–34)
MCHC RBC AUTO-ENTMCNC: 33 G/DL (ref 31–36)
MCV RBC AUTO: 87.6 FL (ref 80–100)
PDW BLD-RTO: 13.5 % (ref 12.4–15.4)
PLATELET # BLD: 194 K/UL (ref 135–450)
PMV BLD AUTO: 7.9 FL (ref 5–10.5)
POTASSIUM SERPL-SCNC: 4.7 MMOL/L (ref 3.5–5.1)
RBC # BLD: 5.01 M/UL (ref 4.2–5.9)
SODIUM BLD-SCNC: 137 MMOL/L (ref 136–145)
TOTAL PROTEIN: 7.4 G/DL (ref 6.4–8.2)
WBC # BLD: 6.3 K/UL (ref 4–11)

## 2019-06-18 PROCEDURE — 80053 COMPREHEN METABOLIC PANEL: CPT

## 2019-06-18 PROCEDURE — 74176 CT ABD & PELVIS W/O CONTRAST: CPT

## 2019-06-18 PROCEDURE — 76870 US EXAM SCROTUM: CPT

## 2019-06-18 PROCEDURE — 85027 COMPLETE CBC AUTOMATED: CPT

## 2019-06-18 PROCEDURE — 36415 COLL VENOUS BLD VENIPUNCTURE: CPT

## 2019-07-22 ENCOUNTER — HOSPITAL ENCOUNTER (OUTPATIENT)
Dept: GENERAL RADIOLOGY | Age: 79
Discharge: HOME | End: 2019-07-22
Payer: MEDICARE

## 2019-07-22 ENCOUNTER — HOSPITAL ENCOUNTER (OUTPATIENT)
Age: 79
Discharge: HOME | End: 2019-07-22
Payer: MEDICARE

## 2019-07-22 DIAGNOSIS — R05.9 COUGH: ICD-10-CM

## 2019-07-22 PROCEDURE — 71046 X-RAY EXAM CHEST 2 VIEWS: CPT

## 2020-04-14 ENCOUNTER — APPOINTMENT (OUTPATIENT)
Dept: GENERAL RADIOLOGY | Age: 80
DRG: 690 | End: 2020-04-14
Payer: MEDICARE

## 2020-04-14 ENCOUNTER — HOSPITAL ENCOUNTER (EMERGENCY)
Age: 80
Discharge: HOME | DRG: 690 | End: 2020-04-14
Attending: STUDENT IN AN ORGANIZED HEALTH CARE EDUCATION/TRAINING PROGRAM
Payer: MEDICARE

## 2020-04-14 VITALS
SYSTOLIC BLOOD PRESSURE: 121 MMHG | TEMPERATURE: 99.8 F | HEART RATE: 99 BPM | DIASTOLIC BLOOD PRESSURE: 67 MMHG | WEIGHT: 209.22 LBS | RESPIRATION RATE: 17 BRPM | OXYGEN SATURATION: 98 % | HEIGHT: 69 IN | BODY MASS INDEX: 30.99 KG/M2

## 2020-04-14 LAB
A/G RATIO: 1.2 (ref 1.1–2.2)
ALBUMIN SERPL-MCNC: 4 G/DL (ref 3.4–5)
ALP BLD-CCNC: 61 U/L (ref 40–129)
ALT SERPL-CCNC: 16 U/L (ref 10–40)
ANION GAP SERPL CALCULATED.3IONS-SCNC: 13 MMOL/L (ref 3–16)
AST SERPL-CCNC: 23 U/L (ref 15–37)
BACTERIA: ABNORMAL /HPF
BASOPHILS ABSOLUTE: 0 K/UL (ref 0–0.2)
BASOPHILS RELATIVE PERCENT: 0.2 %
BILIRUB SERPL-MCNC: 1.1 MG/DL (ref 0–1)
BILIRUBIN URINE: NEGATIVE
BLOOD, URINE: ABNORMAL
BUN BLDV-MCNC: 17 MG/DL (ref 7–20)
CALCIUM SERPL-MCNC: 10.4 MG/DL (ref 8.3–10.6)
CHLORIDE BLD-SCNC: 99 MMOL/L (ref 99–110)
CLARITY: ABNORMAL
CO2: 23 MMOL/L (ref 21–32)
COLOR: YELLOW
COMMENT UA: ABNORMAL
CREAT SERPL-MCNC: 1 MG/DL (ref 0.8–1.3)
CRYSTALS, UA: ABNORMAL /HPF
EKG ATRIAL RATE: 119 BPM
EKG DIAGNOSIS: NORMAL
EKG P AXIS: 29 DEGREES
EKG P-R INTERVAL: 166 MS
EKG Q-T INTERVAL: 306 MS
EKG QRS DURATION: 102 MS
EKG QTC CALCULATION (BAZETT): 430 MS
EKG R AXIS: -26 DEGREES
EKG T AXIS: 103 DEGREES
EKG VENTRICULAR RATE: 119 BPM
EOSINOPHILS ABSOLUTE: 0 K/UL (ref 0–0.6)
EOSINOPHILS RELATIVE PERCENT: 0.2 %
EPITHELIAL CELLS, UA: 1 /HPF (ref 0–5)
GFR AFRICAN AMERICAN: >60
GFR NON-AFRICAN AMERICAN: >60
GLOBULIN: 3.4 G/DL
GLUCOSE BLD-MCNC: 119 MG/DL (ref 70–99)
GLUCOSE URINE: NEGATIVE MG/DL
HCT VFR BLD CALC: 43.5 % (ref 40.5–52.5)
HEMOGLOBIN: 14.5 G/DL (ref 13.5–17.5)
KETONES, URINE: NEGATIVE MG/DL
LACTIC ACID: 1.7 MMOL/L (ref 0.4–2)
LEUKOCYTE ESTERASE, URINE: ABNORMAL
LYMPHOCYTES ABSOLUTE: 0.7 K/UL (ref 1–5.1)
LYMPHOCYTES RELATIVE PERCENT: 6 %
MCH RBC QN AUTO: 29.4 PG (ref 26–34)
MCHC RBC AUTO-ENTMCNC: 33.4 G/DL (ref 31–36)
MCV RBC AUTO: 88.2 FL (ref 80–100)
MICROSCOPIC EXAMINATION: YES
MONOCYTES ABSOLUTE: 0.7 K/UL (ref 0–1.3)
MONOCYTES RELATIVE PERCENT: 6 %
NEUTROPHILS ABSOLUTE: 10.6 K/UL (ref 1.7–7.7)
NEUTROPHILS RELATIVE PERCENT: 87.6 %
NITRITE, URINE: POSITIVE
PDW BLD-RTO: 13.2 % (ref 12.4–15.4)
PH UA: 8.5 (ref 5–8)
PLATELET # BLD: 160 K/UL (ref 135–450)
PMV BLD AUTO: 7.7 FL (ref 5–10.5)
POTASSIUM SERPL-SCNC: 4.1 MMOL/L (ref 3.5–5.1)
PROTEIN UA: 100 MG/DL
RAPID INFLUENZA  B AGN: NEGATIVE
RAPID INFLUENZA A AGN: NEGATIVE
RBC # BLD: 4.93 M/UL (ref 4.2–5.9)
RBC UA: ABNORMAL /HPF (ref 0–4)
SODIUM BLD-SCNC: 135 MMOL/L (ref 136–145)
SPECIFIC GRAVITY UA: 1.02 (ref 1–1.03)
TOTAL PROTEIN: 7.4 G/DL (ref 6.4–8.2)
URINE REFLEX TO CULTURE: YES
URINE TYPE: ABNORMAL
UROBILINOGEN, URINE: 1 E.U./DL
WBC # BLD: 12.1 K/UL (ref 4–11)
WBC UA: 68 /HPF (ref 0–5)

## 2020-04-14 PROCEDURE — 81001 URINALYSIS AUTO W/SCOPE: CPT

## 2020-04-14 PROCEDURE — 80053 COMPREHEN METABOLIC PANEL: CPT

## 2020-04-14 PROCEDURE — 87804 INFLUENZA ASSAY W/OPTIC: CPT

## 2020-04-14 PROCEDURE — 93005 ELECTROCARDIOGRAM TRACING: CPT | Performed by: PHYSICIAN ASSISTANT

## 2020-04-14 PROCEDURE — 85025 COMPLETE CBC W/AUTO DIFF WBC: CPT

## 2020-04-14 PROCEDURE — 71045 X-RAY EXAM CHEST 1 VIEW: CPT

## 2020-04-14 PROCEDURE — 2580000003 HC RX 258: Performed by: PHYSICIAN ASSISTANT

## 2020-04-14 PROCEDURE — 83605 ASSAY OF LACTIC ACID: CPT

## 2020-04-14 PROCEDURE — 6360000002 HC RX W HCPCS: Performed by: STUDENT IN AN ORGANIZED HEALTH CARE EDUCATION/TRAINING PROGRAM

## 2020-04-14 PROCEDURE — 6370000000 HC RX 637 (ALT 250 FOR IP): Performed by: PHYSICIAN ASSISTANT

## 2020-04-14 PROCEDURE — 99284 EMERGENCY DEPT VISIT MOD MDM: CPT

## 2020-04-14 PROCEDURE — 93010 ELECTROCARDIOGRAM REPORT: CPT | Performed by: INTERNAL MEDICINE

## 2020-04-14 PROCEDURE — 2580000003 HC RX 258: Performed by: STUDENT IN AN ORGANIZED HEALTH CARE EDUCATION/TRAINING PROGRAM

## 2020-04-14 PROCEDURE — 96374 THER/PROPH/DIAG INJ IV PUSH: CPT

## 2020-04-14 PROCEDURE — 96361 HYDRATE IV INFUSION ADD-ON: CPT

## 2020-04-14 PROCEDURE — 87086 URINE CULTURE/COLONY COUNT: CPT

## 2020-04-14 RX ORDER — CEFUROXIME AXETIL 500 MG/1
500 TABLET ORAL 2 TIMES DAILY
Qty: 20 TABLET | Refills: 0 | Status: ON HOLD | OUTPATIENT
Start: 2020-04-14 | End: 2020-04-18 | Stop reason: HOSPADM

## 2020-04-14 RX ORDER — ACETAMINOPHEN 500 MG
1000 TABLET ORAL ONCE
Status: COMPLETED | OUTPATIENT
Start: 2020-04-14 | End: 2020-04-14

## 2020-04-14 RX ORDER — 0.9 % SODIUM CHLORIDE 0.9 %
1000 INTRAVENOUS SOLUTION INTRAVENOUS ONCE
Status: COMPLETED | OUTPATIENT
Start: 2020-04-14 | End: 2020-04-14

## 2020-04-14 RX ADMIN — CEFTRIAXONE 1 G: 1 INJECTION, POWDER, FOR SOLUTION INTRAMUSCULAR; INTRAVENOUS at 13:38

## 2020-04-14 RX ADMIN — ACETAMINOPHEN 1000 MG: 500 TABLET, FILM COATED ORAL at 13:21

## 2020-04-14 RX ADMIN — SODIUM CHLORIDE 1000 ML: 9 INJECTION, SOLUTION INTRAVENOUS at 14:13

## 2020-04-14 ASSESSMENT — ENCOUNTER SYMPTOMS
VOMITING: 0
EYE DISCHARGE: 0
CHOKING: 0
APNEA: 0
EYE REDNESS: 0
FACIAL SWELLING: 0
SHORTNESS OF BREATH: 0
NAUSEA: 0
BACK PAIN: 0
ABDOMINAL PAIN: 0

## 2020-04-14 ASSESSMENT — PAIN SCALES - GENERAL: PAINLEVEL_OUTOF10: 0

## 2020-04-14 NOTE — ED PROVIDER NOTES
directly.     Fatou Mcbride MD   4/14/2020                Nsehniitooh Regina Tsang MD  04/14/20 2214

## 2020-04-15 ENCOUNTER — CARE COORDINATION (OUTPATIENT)
Dept: CARE COORDINATION | Age: 80
End: 2020-04-15

## 2020-04-15 LAB
Lab: NORMAL
REPORT: NORMAL
SARS-COV-2: NOT DETECTED
THIS TEST SENT TO: NORMAL

## 2020-04-16 ENCOUNTER — APPOINTMENT (OUTPATIENT)
Dept: CT IMAGING | Age: 80
DRG: 690 | End: 2020-04-16
Payer: MEDICARE

## 2020-04-16 ENCOUNTER — HOSPITAL ENCOUNTER (INPATIENT)
Age: 80
LOS: 2 days | Discharge: HOME | DRG: 690 | End: 2020-04-18
Attending: STUDENT IN AN ORGANIZED HEALTH CARE EDUCATION/TRAINING PROGRAM | Admitting: INTERNAL MEDICINE
Payer: MEDICARE

## 2020-04-16 ENCOUNTER — APPOINTMENT (OUTPATIENT)
Dept: GENERAL RADIOLOGY | Age: 80
DRG: 690 | End: 2020-04-16
Payer: MEDICARE

## 2020-04-16 PROBLEM — N30.00 ACUTE CYSTITIS: Status: ACTIVE | Noted: 2020-04-16

## 2020-04-16 PROBLEM — N12 PYELONEPHRITIS: Status: ACTIVE | Noted: 2020-04-16

## 2020-04-16 PROBLEM — N39.0 UTI (URINARY TRACT INFECTION): Status: ACTIVE | Noted: 2020-04-16

## 2020-04-16 LAB
A/G RATIO: 1 (ref 1.1–2.2)
ALBUMIN SERPL-MCNC: 3.5 G/DL (ref 3.4–5)
ALP BLD-CCNC: 60 U/L (ref 40–129)
ALT SERPL-CCNC: 25 U/L (ref 10–40)
ANION GAP SERPL CALCULATED.3IONS-SCNC: 14 MMOL/L (ref 3–16)
AST SERPL-CCNC: 33 U/L (ref 15–37)
BASOPHILS ABSOLUTE: 0 K/UL (ref 0–0.2)
BASOPHILS RELATIVE PERCENT: 0.6 %
BILIRUB SERPL-MCNC: 0.4 MG/DL (ref 0–1)
BILIRUBIN URINE: NEGATIVE
BLOOD, URINE: ABNORMAL
BUN BLDV-MCNC: 21 MG/DL (ref 7–20)
CALCIUM SERPL-MCNC: 10.4 MG/DL (ref 8.3–10.6)
CHLORIDE BLD-SCNC: 97 MMOL/L (ref 99–110)
CLARITY: ABNORMAL
CO2: 22 MMOL/L (ref 21–32)
COLOR: YELLOW
CREAT SERPL-MCNC: 0.9 MG/DL (ref 0.8–1.3)
EOSINOPHILS ABSOLUTE: 0.1 K/UL (ref 0–0.6)
EOSINOPHILS RELATIVE PERCENT: 1.9 %
EPITHELIAL CELLS, UA: 4 /HPF (ref 0–5)
GFR AFRICAN AMERICAN: >60
GFR NON-AFRICAN AMERICAN: >60
GLOBULIN: 3.5 G/DL
GLUCOSE BLD-MCNC: 127 MG/DL (ref 70–99)
GLUCOSE URINE: NEGATIVE MG/DL
HCT VFR BLD CALC: 39.2 % (ref 40.5–52.5)
HEMOGLOBIN: 13.3 G/DL (ref 13.5–17.5)
HYALINE CASTS: 5 /LPF (ref 0–8)
KETONES, URINE: ABNORMAL MG/DL
LEUKOCYTE ESTERASE, URINE: ABNORMAL
LYMPHOCYTES ABSOLUTE: 1 K/UL (ref 1–5.1)
LYMPHOCYTES RELATIVE PERCENT: 16.8 %
MCH RBC QN AUTO: 29.7 PG (ref 26–34)
MCHC RBC AUTO-ENTMCNC: 33.8 G/DL (ref 31–36)
MCV RBC AUTO: 87.9 FL (ref 80–100)
MICROSCOPIC EXAMINATION: YES
MONOCYTES ABSOLUTE: 0.7 K/UL (ref 0–1.3)
MONOCYTES RELATIVE PERCENT: 11.4 %
NEUTROPHILS ABSOLUTE: 4.2 K/UL (ref 1.7–7.7)
NEUTROPHILS RELATIVE PERCENT: 69.3 %
NITRITE, URINE: NEGATIVE
PDW BLD-RTO: 13.2 % (ref 12.4–15.4)
PH UA: 6 (ref 5–8)
PLATELET # BLD: 131 K/UL (ref 135–450)
PMV BLD AUTO: 7.9 FL (ref 5–10.5)
POTASSIUM REFLEX MAGNESIUM: 3.8 MMOL/L (ref 3.5–5.1)
PROCALCITONIN: 0.53 NG/ML (ref 0–0.15)
PROTEIN UA: 100 MG/DL
RBC # BLD: 4.47 M/UL (ref 4.2–5.9)
RBC UA: 8 /HPF (ref 0–4)
SODIUM BLD-SCNC: 133 MMOL/L (ref 136–145)
SPECIFIC GRAVITY UA: 1.02 (ref 1–1.03)
TOTAL PROTEIN: 7 G/DL (ref 6.4–8.2)
URINE CULTURE, ROUTINE: NORMAL
URINE REFLEX TO CULTURE: YES
URINE TYPE: ABNORMAL
UROBILINOGEN, URINE: 1 E.U./DL
WBC # BLD: 6.1 K/UL (ref 4–11)
WBC UA: 19 /HPF (ref 0–5)

## 2020-04-16 PROCEDURE — 87086 URINE CULTURE/COLONY COUNT: CPT

## 2020-04-16 PROCEDURE — 71045 X-RAY EXAM CHEST 1 VIEW: CPT

## 2020-04-16 PROCEDURE — 87040 BLOOD CULTURE FOR BACTERIA: CPT

## 2020-04-16 PROCEDURE — 85025 COMPLETE CBC W/AUTO DIFF WBC: CPT

## 2020-04-16 PROCEDURE — 1200000000 HC SEMI PRIVATE

## 2020-04-16 PROCEDURE — 2580000003 HC RX 258: Performed by: STUDENT IN AN ORGANIZED HEALTH CARE EDUCATION/TRAINING PROGRAM

## 2020-04-16 PROCEDURE — 81001 URINALYSIS AUTO W/SCOPE: CPT

## 2020-04-16 PROCEDURE — 6370000000 HC RX 637 (ALT 250 FOR IP): Performed by: INTERNAL MEDICINE

## 2020-04-16 PROCEDURE — 6360000004 HC RX CONTRAST MEDICATION: Performed by: STUDENT IN AN ORGANIZED HEALTH CARE EDUCATION/TRAINING PROGRAM

## 2020-04-16 PROCEDURE — 2580000003 HC RX 258: Performed by: INTERNAL MEDICINE

## 2020-04-16 PROCEDURE — 74177 CT ABD & PELVIS W/CONTRAST: CPT

## 2020-04-16 PROCEDURE — 6360000002 HC RX W HCPCS: Performed by: INTERNAL MEDICINE

## 2020-04-16 PROCEDURE — 80053 COMPREHEN METABOLIC PANEL: CPT

## 2020-04-16 PROCEDURE — 6360000002 HC RX W HCPCS: Performed by: STUDENT IN AN ORGANIZED HEALTH CARE EDUCATION/TRAINING PROGRAM

## 2020-04-16 PROCEDURE — 84145 PROCALCITONIN (PCT): CPT

## 2020-04-16 PROCEDURE — 99285 EMERGENCY DEPT VISIT HI MDM: CPT

## 2020-04-16 RX ORDER — ACETAMINOPHEN 325 MG/1
650 TABLET ORAL EVERY 6 HOURS PRN
Status: DISCONTINUED | OUTPATIENT
Start: 2020-04-16 | End: 2020-04-18 | Stop reason: HOSPADM

## 2020-04-16 RX ORDER — SODIUM CHLORIDE 0.9 % (FLUSH) 0.9 %
10 SYRINGE (ML) INJECTION PRN
Status: DISCONTINUED | OUTPATIENT
Start: 2020-04-16 | End: 2020-04-16

## 2020-04-16 RX ORDER — ACETAMINOPHEN 650 MG/1
650 SUPPOSITORY RECTAL EVERY 6 HOURS PRN
Status: DISCONTINUED | OUTPATIENT
Start: 2020-04-16 | End: 2020-04-16

## 2020-04-16 RX ORDER — VITAMIN B COMPLEX
1000 TABLET ORAL DAILY
Status: DISCONTINUED | OUTPATIENT
Start: 2020-04-17 | End: 2020-04-18 | Stop reason: HOSPADM

## 2020-04-16 RX ORDER — ACETAMINOPHEN 650 MG/1
650 SUPPOSITORY RECTAL EVERY 6 HOURS PRN
Status: DISCONTINUED | OUTPATIENT
Start: 2020-04-16 | End: 2020-04-18 | Stop reason: HOSPADM

## 2020-04-16 RX ORDER — SODIUM CHLORIDE 9 MG/ML
INJECTION, SOLUTION INTRAVENOUS CONTINUOUS
Status: DISCONTINUED | OUTPATIENT
Start: 2020-04-16 | End: 2020-04-17

## 2020-04-16 RX ORDER — PROMETHAZINE HYDROCHLORIDE 25 MG/1
12.5 TABLET ORAL EVERY 6 HOURS PRN
Status: DISCONTINUED | OUTPATIENT
Start: 2020-04-16 | End: 2020-04-18 | Stop reason: HOSPADM

## 2020-04-16 RX ORDER — POLYETHYLENE GLYCOL 3350 17 G/17G
17 POWDER, FOR SOLUTION ORAL DAILY PRN
Status: DISCONTINUED | OUTPATIENT
Start: 2020-04-16 | End: 2020-04-16

## 2020-04-16 RX ORDER — PROMETHAZINE HYDROCHLORIDE 25 MG/1
12.5 TABLET ORAL EVERY 6 HOURS PRN
Status: DISCONTINUED | OUTPATIENT
Start: 2020-04-16 | End: 2020-04-16

## 2020-04-16 RX ORDER — POLYETHYLENE GLYCOL 3350 17 G/17G
17 POWDER, FOR SOLUTION ORAL DAILY PRN
Status: DISCONTINUED | OUTPATIENT
Start: 2020-04-16 | End: 2020-04-18 | Stop reason: HOSPADM

## 2020-04-16 RX ORDER — ONDANSETRON 2 MG/ML
4 INJECTION INTRAMUSCULAR; INTRAVENOUS EVERY 6 HOURS PRN
Status: DISCONTINUED | OUTPATIENT
Start: 2020-04-16 | End: 2020-04-16

## 2020-04-16 RX ORDER — CITALOPRAM 20 MG/1
20 TABLET ORAL DAILY
Status: DISCONTINUED | OUTPATIENT
Start: 2020-04-17 | End: 2020-04-18 | Stop reason: HOSPADM

## 2020-04-16 RX ORDER — SODIUM CHLORIDE 0.9 % (FLUSH) 0.9 %
10 SYRINGE (ML) INJECTION EVERY 12 HOURS SCHEDULED
Status: DISCONTINUED | OUTPATIENT
Start: 2020-04-16 | End: 2020-04-18 | Stop reason: HOSPADM

## 2020-04-16 RX ORDER — ASPIRIN 81 MG/1
81 TABLET ORAL DAILY
Status: DISCONTINUED | OUTPATIENT
Start: 2020-04-17 | End: 2020-04-18 | Stop reason: HOSPADM

## 2020-04-16 RX ORDER — SODIUM CHLORIDE 0.9 % (FLUSH) 0.9 %
10 SYRINGE (ML) INJECTION PRN
Status: DISCONTINUED | OUTPATIENT
Start: 2020-04-16 | End: 2020-04-18 | Stop reason: HOSPADM

## 2020-04-16 RX ORDER — MULTIVITAMIN WITH FOLIC ACID 400 MCG
1 TABLET ORAL DAILY
Status: DISCONTINUED | OUTPATIENT
Start: 2020-04-17 | End: 2020-04-18 | Stop reason: HOSPADM

## 2020-04-16 RX ORDER — ROSUVASTATIN CALCIUM 20 MG/1
20 TABLET, COATED ORAL NIGHTLY
Status: DISCONTINUED | OUTPATIENT
Start: 2020-04-16 | End: 2020-04-18 | Stop reason: HOSPADM

## 2020-04-16 RX ORDER — ONDANSETRON 2 MG/ML
4 INJECTION INTRAMUSCULAR; INTRAVENOUS EVERY 6 HOURS PRN
Status: DISCONTINUED | OUTPATIENT
Start: 2020-04-16 | End: 2020-04-18 | Stop reason: HOSPADM

## 2020-04-16 RX ORDER — SODIUM CHLORIDE 0.9 % (FLUSH) 0.9 %
10 SYRINGE (ML) INJECTION EVERY 12 HOURS SCHEDULED
Status: DISCONTINUED | OUTPATIENT
Start: 2020-04-16 | End: 2020-04-16

## 2020-04-16 RX ORDER — ACETAMINOPHEN 325 MG/1
650 TABLET ORAL EVERY 6 HOURS PRN
Status: DISCONTINUED | OUTPATIENT
Start: 2020-04-16 | End: 2020-04-16

## 2020-04-16 RX ADMIN — ROSUVASTATIN CALCIUM 20 MG: 20 TABLET, FILM COATED ORAL at 20:56

## 2020-04-16 RX ADMIN — Medication 10 ML: at 20:57

## 2020-04-16 RX ADMIN — ENOXAPARIN SODIUM 40 MG: 40 INJECTION SUBCUTANEOUS at 20:56

## 2020-04-16 RX ADMIN — CEFTRIAXONE 1 G: 1 INJECTION, POWDER, FOR SOLUTION INTRAMUSCULAR; INTRAVENOUS at 19:13

## 2020-04-16 RX ADMIN — IOPAMIDOL 75 ML: 755 INJECTION, SOLUTION INTRAVENOUS at 18:14

## 2020-04-16 RX ADMIN — PIPERACILLIN AND TAZOBACTAM 3.38 G: 3; .375 INJECTION, POWDER, LYOPHILIZED, FOR SOLUTION INTRAVENOUS at 20:55

## 2020-04-16 RX ADMIN — SODIUM CHLORIDE: 9 INJECTION, SOLUTION INTRAVENOUS at 20:42

## 2020-04-16 ASSESSMENT — PAIN SCALES - GENERAL: PAINLEVEL_OUTOF10: 0

## 2020-04-16 NOTE — CONSULTS
Due in 2015    COLONOSCOPY  10/05/2016    Ghastine    OTHER SURGICAL HISTORY  6/2013, 8/2014    Ileoconduit dilatation Gregor Ramirez)       Medications (prior to admission):  Prior to Admission medications    Medication Sig Start Date End Date Taking? Authorizing Provider   cefUROXime (CEFTIN) 500 MG tablet Take 1 tablet by mouth 2 times daily for 10 days 4/14/20 4/24/20  Berenice Alfredo MD   rosuvastatin (CRESTOR) 20 MG tablet TAKE 1 TABLET BY MOUTH EVERY NIGHT AT BEDTIME 12/9/19   Kalyn Deshpande MD   citalopram (CELEXA) 20 MG tablet Take 20 mg by mouth daily 6/11/18   Historical Provider, MD   Multiple Vitamin (MULTI VITAMIN MENS PO) Take 1 tablet by mouth daily     Historical Provider, MD   Vitamin D (CHOLECALCIFEROL) 1000 UNITS CAPS capsule Take 1,000 Units by mouth daily. Historical Provider, MD   aspirin 81 MG EC tablet Take 81 mg by mouth daily. Historical Provider, MD       Allergy(ies):  Aricept Jacky Jeovany hcl] and Namenda [memantine hcl]    Social History:  TOBACCO:  reports that he quit smoking about 51 years ago. He has a 12.00 pack-year smoking history. He has never used smokeless tobacco.  ETOH:  reports current alcohol use. Family History:      Problem Relation Age of Onset    Lung Cancer Father     Cancer Father         Lung    Diabetes Brother     Cancer Brother         leukemia    Cancer Brother 47        Leukemia    Breast Cancer Sister     Cancer Sister         Breast       Review of Systems:  Confused from underlying dementia. Vitals and physical examination:  /79   Pulse 84   Temp 98.5 °F (36.9 °C) (Oral)   Resp 17   Ht 5' 8\" (1.727 m)   Wt 206 lb 5.6 oz (93.6 kg)   SpO2 96%   BMI 31.38 kg/m²   Gen/overall appearance: Not in acute distress. Alert. Confused. Head: Normocephalic, atraumatic  Eyes: EOMI, good acuity  ENT: Oral mucosa moist  Neck: No JVD, thyromegaly  CVS: Nml S1S2, no MRG, RRR  Pulm: Clear bilaterally.  No crackles/wheezes  Gastrointestinal: Soft, NT/ND, +BS  Musculoskeletal: No edema. Warm  Neuro: No focal deficit. Moves extremity spontaneously. Psychiatry: Appropriate affect. Not agitated. Skin: Warm, dry with normal turgor. No rash  Capillary refill: Brisk,< 3 seconds   Peripheral Pulses: +2 palpable, equal bilaterally       Labs/imaging/EKG:  CBC:   Recent Labs     04/14/20  1115 04/16/20  1628   WBC 12.1* 6.1   HGB 14.5 13.3*    131*     BMP:    Recent Labs     04/14/20  1115 04/16/20  1628   * 133*   K 4.1 3.8   CL 99 97*   CO2 23 22   BUN 17 21*   CREATININE 1.0 0.9   GLUCOSE 119* 127*     Hepatic:   Recent Labs     04/14/20  1115 04/16/20  1628   AST 23 33   ALT 16 25   BILITOT 1.1* 0.4   ALKPHOS 61 60       Ct Abdomen Pelvis W Iv Contrast Additional Contrast? None    Result Date: 4/16/2020  EXAMINATION: CT OF THE ABDOMEN AND PELVIS WITH CONTRAST 4/16/2020 6:07 pm TECHNIQUE: CT of the abdomen and pelvis was performed with the administration of intravenous contrast. Multiplanar reformatted images are provided for review. Dose modulation, iterative reconstruction, and/or weight based adjustment of the mA/kV was utilized to reduce the radiation dose to as low as reasonably achievable. COMPARISON: 06/18/2019 HISTORY: ORDERING SYSTEM PROVIDED HISTORY: R/O pyelonephritis TECHNOLOGIST PROVIDED HISTORY: Reason for exam:->R/O pyelonephritis Additional Contrast?->None Reason for Exam: Fever (pt afebrile upon arrival, sent by PCP), poss pyelonephritis Acuity: Acute Type of Exam: Initial FINDINGS: Lower Chest: The lung bases are clear and the heart size is normal.  There is calcific coronary artery disease primarily involving the left main and LAD. Ascending aorta is slightly ectatic. Organs: There is a 1 cm rim calcified gallstone. No pericholecystic fluid or fat stranding. Small cyst in the left lobe of the liver, unchanged.   The liver, spleen, pancreas and adrenal glands are normal. There is bilateral

## 2020-04-16 NOTE — ED NOTES
Pt sitting on side of bed. In no acute distress. Aware of being admitted. VSS. IV push rocephin administered.  Call light in reach     [facility]  04/16/20 1924

## 2020-04-16 NOTE — PROGRESS NOTES
ADMINISTRATION  COVID 19 NOT DETECTED      Patient informed   Wife answered    uti   Resting and better    Dr Melissa Smith informed    Donna Kurtz MD, [address], [address], [address]    Cell [phone]  Office [phone]  4/16/2020  9:53 AM

## 2020-04-16 NOTE — ED PROVIDER NOTES
(*)     All other components within normal limits    Narrative:     Performed at:  [facility]  [address] [place] [address]   Phone [phone]   URINE RT REFLEX TO CULTURE - Abnormal; Notable for the following components:    Clarity, UA CLOUDY (*)     Ketones, Urine TRACE (*)     Blood, Urine SMALL (*)     Protein,  (*)     Leukocyte Esterase, Urine MODERATE (*)     All other components within normal limits    Narrative:     Performed at:  [facility]  [address] [place] [address]   Phone [phone]   PROCALCITONIN - Abnormal; Notable for the following components:    Procalcitonin 0.53 (*)     All other components within normal limits    Narrative:     Performed at:  [facility]  [address] [place] [address]   Phone [phone]   MICROSCOPIC URINALYSIS - Abnormal; Notable for the following components:    WBC, UA 19 (*)     RBC, UA 8 (*)     All other components within normal limits    Narrative:     Performed at:  [facility]  [address] [place] [address]   Phone [phone]   CULTURE, URINE   CULTURE, BLOOD 1   CULTURE, BLOOD 2   BASIC METABOLIC PANEL W/ REFLEX TO MG FOR LOW K   CBC WITH AUTO DIFFERENTIAL      CT ABDOMEN PELVIS W IV CONTRAST Additional Contrast? None   Final Result   Status post cystectomy and prostatectomy with right lower quadrant ileal   conduit. There is mild urothelial thickening involving the bilateral renal pelvises. This may reflect inflammation or infection. Focal diminished cortical enhancement in the posterior lower pole of the   right kidney highly suspicious for inflammatory/edematous change of acute   pyelonephritis. No evidence of renal or perinephric abscess. Cholelithiasis. There is a 1 cm rim calcified gallstone. Bilateral L5 spondylolysis.       [employer]

## 2020-04-17 LAB
ANION GAP SERPL CALCULATED.3IONS-SCNC: 13 MMOL/L (ref 3–16)
BASOPHILS ABSOLUTE: 0 K/UL (ref 0–0.2)
BASOPHILS RELATIVE PERCENT: 0.6 %
BUN BLDV-MCNC: 17 MG/DL (ref 7–20)
CALCIUM SERPL-MCNC: 10.1 MG/DL (ref 8.3–10.6)
CHLORIDE BLD-SCNC: 98 MMOL/L (ref 99–110)
CO2: 25 MMOL/L (ref 21–32)
CREAT SERPL-MCNC: 0.9 MG/DL (ref 0.8–1.3)
EOSINOPHILS ABSOLUTE: 0.2 K/UL (ref 0–0.6)
EOSINOPHILS RELATIVE PERCENT: 3.6 %
GFR AFRICAN AMERICAN: >60
GFR NON-AFRICAN AMERICAN: >60
GLUCOSE BLD-MCNC: 118 MG/DL (ref 70–99)
HCT VFR BLD CALC: 36.3 % (ref 40.5–52.5)
HEMOGLOBIN: 12.4 G/DL (ref 13.5–17.5)
LYMPHOCYTES ABSOLUTE: 1 K/UL (ref 1–5.1)
LYMPHOCYTES RELATIVE PERCENT: 18.6 %
MCH RBC QN AUTO: 29.8 PG (ref 26–34)
MCHC RBC AUTO-ENTMCNC: 34.1 G/DL (ref 31–36)
MCV RBC AUTO: 87.4 FL (ref 80–100)
MONOCYTES ABSOLUTE: 0.8 K/UL (ref 0–1.3)
MONOCYTES RELATIVE PERCENT: 15 %
NEUTROPHILS ABSOLUTE: 3.5 K/UL (ref 1.7–7.7)
NEUTROPHILS RELATIVE PERCENT: 62.2 %
PDW BLD-RTO: 13.1 % (ref 12.4–15.4)
PLATELET # BLD: 142 K/UL (ref 135–450)
PMV BLD AUTO: 7.7 FL (ref 5–10.5)
POTASSIUM REFLEX MAGNESIUM: 4.2 MMOL/L (ref 3.5–5.1)
RBC # BLD: 4.16 M/UL (ref 4.2–5.9)
SODIUM BLD-SCNC: 136 MMOL/L (ref 136–145)
URINE CULTURE, ROUTINE: NORMAL
WBC # BLD: 5.6 K/UL (ref 4–11)

## 2020-04-17 PROCEDURE — 6360000002 HC RX W HCPCS: Performed by: INTERNAL MEDICINE

## 2020-04-17 PROCEDURE — 80048 BASIC METABOLIC PNL TOTAL CA: CPT

## 2020-04-17 PROCEDURE — 94760 N-INVAS EAR/PLS OXIMETRY 1: CPT

## 2020-04-17 PROCEDURE — 6370000000 HC RX 637 (ALT 250 FOR IP): Performed by: INTERNAL MEDICINE

## 2020-04-17 PROCEDURE — 99223 1ST HOSP IP/OBS HIGH 75: CPT | Performed by: INTERNAL MEDICINE

## 2020-04-17 PROCEDURE — 1200000000 HC SEMI PRIVATE

## 2020-04-17 PROCEDURE — 85025 COMPLETE CBC W/AUTO DIFF WBC: CPT

## 2020-04-17 PROCEDURE — 2580000003 HC RX 258: Performed by: INTERNAL MEDICINE

## 2020-04-17 PROCEDURE — 36415 COLL VENOUS BLD VENIPUNCTURE: CPT

## 2020-04-17 RX ORDER — LORAZEPAM 0.5 MG/1
0.25 TABLET ORAL ONCE
Status: COMPLETED | OUTPATIENT
Start: 2020-04-17 | End: 2020-04-17

## 2020-04-17 RX ADMIN — Medication 10 ML: at 09:05

## 2020-04-17 RX ADMIN — Medication 10 ML: at 19:55

## 2020-04-17 RX ADMIN — PIPERACILLIN AND TAZOBACTAM 3.38 G: 3; .375 INJECTION, POWDER, LYOPHILIZED, FOR SOLUTION INTRAVENOUS at 11:34

## 2020-04-17 RX ADMIN — LORAZEPAM 0.25 MG: 0.5 TABLET ORAL at 21:37

## 2020-04-17 RX ADMIN — PIPERACILLIN AND TAZOBACTAM 3.38 G: 3; .375 INJECTION, POWDER, LYOPHILIZED, FOR SOLUTION INTRAVENOUS at 04:27

## 2020-04-17 RX ADMIN — VITAMIN D, TAB 1000IU (100/BT) 1000 UNITS: 25 TAB at 09:05

## 2020-04-17 RX ADMIN — ASPIRIN 81 MG: 81 TABLET, COATED ORAL at 09:05

## 2020-04-17 RX ADMIN — CITALOPRAM HYDROBROMIDE 20 MG: 20 TABLET ORAL at 09:05

## 2020-04-17 RX ADMIN — ENOXAPARIN SODIUM 40 MG: 40 INJECTION SUBCUTANEOUS at 19:54

## 2020-04-17 RX ADMIN — PIPERACILLIN AND TAZOBACTAM 3.38 G: 3; .375 INJECTION, POWDER, LYOPHILIZED, FOR SOLUTION INTRAVENOUS at 19:55

## 2020-04-17 RX ADMIN — ROSUVASTATIN CALCIUM 20 MG: 20 TABLET, FILM COATED ORAL at 19:54

## 2020-04-17 RX ADMIN — THERA TABS 1 TABLET: TAB at 09:05

## 2020-04-17 ASSESSMENT — PAIN SCALES - GENERAL
PAINLEVEL_OUTOF10: 0

## 2020-04-17 NOTE — CONSULTS
Skin: warm and dry, no rash or erythema  Head: normocephalic and atraumatic  Eyes: pupils equal, round, and reactive to light, conjunctivae normal  ENT: tympanic membrane, external ear and ear canal normal bilaterally, nose without deformity, nasal mucosa and turbinates normal without polyps  Neck: supple and non-tender without mass, no thyromegaly  no cervical lymphadenopathy  Pulmonary/Chest: clear to auscultation bilaterally- no wheezes, rales or rhonchi, normal air movement, no respiratory distress  Cardiovascular: normal rate, regular rhythm, normal S1 and S2, no murmurs, rubs, clicks, or gallops, no carotid bruits  Abdomen: soft, non-tender, non-distended, normal bowel sounds, no masses or organomegaly  Extremities: no cyanosis, clubbing or edema  Musculoskeletal: normal range of motion, no joint swelling, deformity or tenderness  Neurologic: reflexes normal and symmetric, no cranial nerve deficit  Lines:  IV  Ileal conduit in place and working well no abd tenderness          DATA:    Lab Results   Component Value Date    WBC 5.6 04/17/2020    HGB 12.4 (L) 04/17/2020    HCT 36.3 (L) 04/17/2020    MCV 87.4 04/17/2020     04/17/2020     Lab Results   Component Value Date    CREATININE 0.9 04/17/2020    BUN 17 04/17/2020     04/17/2020    K 4.2 04/17/2020    CL 98 (L) 04/17/2020    CO2 25 04/17/2020       Hepatic Function Panel:   Lab Results   Component Value Date    ALKPHOS 60 04/16/2020    ALT 25 04/16/2020    AST 33 04/16/2020    PROT 7.0 04/16/2020    PROT 7.1 09/26/2012    BILITOT 0.4 04/16/2020    LABALBU 3.5 04/16/2020     UA:  Lab Results   Component Value Date    COLORU YELLOW 04/16/2020    CLARITYU CLOUDY 04/16/2020    GLUCOSEU Negative 04/16/2020    GLUCOSEU NEGATIVE 12/20/2010    BILIRUBINUR Negative 04/16/2020    BILIRUBINUR NEGATIVE 12/20/2010    KETUA TRACE 04/16/2020    SPECGRAV 1.019 04/16/2020    BLOODU SMALL 04/16/2020    PHUR 6.0 04/16/2020    PROTEINU 100 04/16/2020

## 2020-04-17 NOTE — H&P
Principal Problem:    Pyelonephritis  Active Problems:    Late onset Alzheimer's disease without behavioral disturbance ([facility] [address]    Coronary artery disease due to calcified coronary lesion    Pure hypercholesterolemia    History and Physical Dictated, #  [id number]    Time > 35 minutes reviewing chart and patient data, examining and interviewing patient, and discussing with nursing staff, family, etc.

## 2020-04-17 NOTE — H&P
bilaterally without wheezes,  rhonchi, or crackles. ABDOMEN:  Bowel sounds are positive. The abdomen is soft, nontender,  nondistended. There is no hepatosplenomegaly noted. There is an ileal  conduit present in his right lower quadrant functioning and appearing  normally. EXTREMITIES:  No clubbing, cyanosis, edema, or tenderness. Pulses are  2+ x4 extremities. SKIN:  No rashes or lesions. LYMPH:  No supraclavicular or cervical adenopathy is noted. NEUROLOGIC:  He is alert, pleasant, cooperative. Short-term memory is  limited but cranial nerves II through XII are grossly intact. He moves  all extremities without difficulty. DIAGNOSTIC STUDIES:  White blood cell count 6.1, hemoglobin 13.3,  platelet count 793. Comprehensive metabolic panel shows sodium 133,  potassium 3.8, chloride of 97, CO2 of 22, glucose 127, BUN 21,  creatinine 0.9. Hepatic profile was unremarkable. Procalcitonin was  elevated at 0.53. Chest x-ray showed no acute cardiopulmonary disease. Urinalysis shows specific gravity 1.019 with trace ketones, small blood,  moderate leukocyte esterase, 19 white blood cells, 8 red blood cells,  and 4 epithelial cells. CT scan of the abdomen reveals status post cystectomy and prostatectomy  with right lower quadrant ileal conduit. There is mild urethral  thickening involving the bilateral renal pelvises and there is focal  diminished cortical enhancement in the posterior lower pole of the right  kidney suspicious for inflammatory and edematous changes of acute  pyelonephritis. There are coronary artery calcifications. Blood cultures are pending. This morning, the white blood cell count  was 5.6, hemoglobin 12.4, and platelet count of 912. BMP is  unremarkable with a BUN of 17 and creatinine of 0.9. ASSESSMENT:  1. Pyelonephritis, failed outpatient treatment. 2.  Alzheimer disease. 3.  Coronary artery disease. 4.  Hyperlipidemia.     PLAN:  At this point, the patient will remain on IV Zosyn as he seemed  to fail outpatient Ceftin. We will follow cultures and await to see if  any organism grows on his urine culture. Infectious Disease will be  consulted for recommendations for discharge antibiotic and duration of  therapy since he did fail Ceftin initially. IV fluids will be stopped  this morning as he is currently afebrile, and his renal function is at  baseline.         Nai May MD    D: 04/17/2020 7:58:29       T: 04/17/2020 8:07:53     [email or web address]  Job#: [id number]     Doc#: [id number]    CC:

## 2020-04-17 NOTE — PROGRESS NOTES
4 Eyes Skin Assessment     The patient is being assess for  Admission    I agree that 2 RN's have performed a thorough Head to Toe Skin Assessment on the patient. ALL assessment sites listed below have been assessed. Areas assessed by both nurses:   [x]   Head, Face, and Ears   [x]   Shoulders, Back, and Chest  [x]   Arms, Elbows, and Hands   [x]   Coccyx, Sacrum, and IschIum  [x]   Legs, Feet, and Heels        Does the Patient have Skin Breakdown?   No         Lambert Prevention initiated:  No   Wound Care Orders initiated:  No      [facility] nurse consulted for Pressure Injury (Stage 3,4, Unstageable, DTI, NWPT, and Complex wounds), New and Established Ostomies:  No      Nurse 1 eSignature: Electronically signed by Dar Lei RN on 4/16/20 at 10:37 PM EDT    **SHARE this note so that the co-signing nurse is able to place an eSignature**    Nurse 2 eSignature: Electronically signed by Meli Chong RN on 4/16/20 at 10:38 PM EDT

## 2020-04-18 ENCOUNTER — TELEPHONE (OUTPATIENT)
Dept: INTERNAL MEDICINE CLINIC | Age: 80
End: 2020-04-18

## 2020-04-18 VITALS
HEIGHT: 68 IN | WEIGHT: 204.37 LBS | RESPIRATION RATE: 18 BRPM | HEART RATE: 76 BPM | DIASTOLIC BLOOD PRESSURE: 92 MMHG | SYSTOLIC BLOOD PRESSURE: 159 MMHG | OXYGEN SATURATION: 96 % | BODY MASS INDEX: 30.97 KG/M2 | TEMPERATURE: 98.1 F

## 2020-04-18 LAB
ANION GAP SERPL CALCULATED.3IONS-SCNC: 12 MMOL/L (ref 3–16)
BUN BLDV-MCNC: 17 MG/DL (ref 7–20)
CALCIUM SERPL-MCNC: 10.6 MG/DL (ref 8.3–10.6)
CHLORIDE BLD-SCNC: 103 MMOL/L (ref 99–110)
CO2: 25 MMOL/L (ref 21–32)
CREAT SERPL-MCNC: 0.8 MG/DL (ref 0.8–1.3)
GFR AFRICAN AMERICAN: >60
GFR NON-AFRICAN AMERICAN: >60
GLUCOSE BLD-MCNC: 121 MG/DL (ref 70–99)
POTASSIUM REFLEX MAGNESIUM: 3.9 MMOL/L (ref 3.5–5.1)
SODIUM BLD-SCNC: 140 MMOL/L (ref 136–145)

## 2020-04-18 PROCEDURE — 2580000003 HC RX 258: Performed by: INTERNAL MEDICINE

## 2020-04-18 PROCEDURE — 80048 BASIC METABOLIC PNL TOTAL CA: CPT

## 2020-04-18 PROCEDURE — 36415 COLL VENOUS BLD VENIPUNCTURE: CPT

## 2020-04-18 PROCEDURE — 94760 N-INVAS EAR/PLS OXIMETRY 1: CPT

## 2020-04-18 PROCEDURE — 6360000002 HC RX W HCPCS: Performed by: INTERNAL MEDICINE

## 2020-04-18 PROCEDURE — 6370000000 HC RX 637 (ALT 250 FOR IP): Performed by: INTERNAL MEDICINE

## 2020-04-18 PROCEDURE — 99239 HOSP IP/OBS DSCHRG MGMT >30: CPT | Performed by: INTERNAL MEDICINE

## 2020-04-18 RX ORDER — LEVOFLOXACIN 500 MG/1
500 TABLET, FILM COATED ORAL DAILY
Qty: 7 TABLET | Refills: 0 | Status: SHIPPED | OUTPATIENT
Start: 2020-04-18 | End: 2020-04-25

## 2020-04-18 RX ADMIN — VITAMIN D, TAB 1000IU (100/BT) 1000 UNITS: 25 TAB at 08:34

## 2020-04-18 RX ADMIN — ASPIRIN 81 MG: 81 TABLET, COATED ORAL at 08:35

## 2020-04-18 RX ADMIN — THERA TABS 1 TABLET: TAB at 08:34

## 2020-04-18 RX ADMIN — PIPERACILLIN AND TAZOBACTAM 3.38 G: 3; .375 INJECTION, POWDER, LYOPHILIZED, FOR SOLUTION INTRAVENOUS at 04:37

## 2020-04-18 RX ADMIN — CITALOPRAM HYDROBROMIDE 20 MG: 20 TABLET ORAL at 08:34

## 2020-04-18 RX ADMIN — PIPERACILLIN AND TAZOBACTAM 3.38 G: 3; .375 INJECTION, POWDER, LYOPHILIZED, FOR SOLUTION INTRAVENOUS at 12:10

## 2020-04-18 ASSESSMENT — PAIN SCALES - GENERAL: PAINLEVEL_OUTOF10: 0

## 2020-04-18 NOTE — PLAN OF CARE
Problem: Confusion - Acute:  Goal: Absence of continued neurological deterioration signs and symptoms  Description: Absence of continued neurological deterioration signs and symptoms  4/17/2020 2005 by Clinton Guerrero RN  Outcome: Ongoing  4/17/2020 1058 by Lindsay Sauceda RN  Outcome: Ongoing  Goal: Mental status will be restored to baseline  Description: Mental status will be restored to baseline  4/17/2020 2005 by Clinton Guerrero RN  Outcome: Ongoing  4/17/2020 1058 by Lindsay Sauceda RN  Outcome: Ongoing     Problem: Discharge Planning:  Goal: Ability to perform activities of daily living will improve  Description: Ability to perform activities of daily living will improve  4/17/2020 2005 by Clinton Guerrero RN  Outcome: Ongoing  4/17/2020 1058 by Lindsay Saucead RN  Outcome: Ongoing  Goal: Participates in care planning  Description: Participates in care planning  4/17/2020 2005 by Clinton Guerrero RN  Outcome: Ongoing  4/17/2020 1058 by Lindsay Sauceda RN  Outcome: Ongoing     Problem: Injury - Risk of, Physical Injury:  Goal: Absence of physical injury  Description: Absence of physical injury  4/17/2020 2005 by Clinton Guerrero RN  Outcome: Ongoing  4/17/2020 1058 by Lindsay Sauceda RN  Outcome: Ongoing  Goal: Will remain free from falls  Description: Will remain free from falls  4/17/2020 2005 by Clinton Guerrero RN  Outcome: Ongoing  4/17/2020 1058 by Lindsay Sauceda RN  Outcome: Ongoing     Problem: Mood - Altered:  Goal: Mood stable  Description: Mood stable  4/17/2020 2005 by Clinton Guerrero RN  Outcome: Ongoing  4/17/2020 1058 by Lindsay Sauceda RN  Outcome: Ongoing  Goal: Absence of abusive behavior  Description: Absence of abusive behavior  4/17/2020 2005 by Clinton Guerrero RN  Outcome: Ongoing  4/17/2020 1058 by Lindsay Sauceda RN  Outcome: Ongoing  Goal: Verbalizations of feeling emotionally comfortable while being cared for will increase  Description: Verbalizations of feeling emotionally comfortable while being cared for

## 2020-04-18 NOTE — DISCHARGE SUMMARY
[facility]  INTERNAL MEDICINE    PATIENT INFORMATION:     Name: Meliza Barrera  Date/Time of Admission: 2020  4:01 PM   MRN: [id number] Discharge Date: 2020   : 1940 67 y.o. Admitting Physician: Nae Verdin MD   PCP: Lorena Antunez MD Discharge Physician: Aysha Suarez, [address]:   Meliza Barrera is a 67y.o. year old male who presented to [facility] on 2020  4:01 PM for persistent fever. He was seen in the ED two days prior on 20 for fever/chills/rigors and SOB. At that time, his workup was negative except for a UTI. He was discharged home from the ED on Ceftin. Urine culture from that visit showed >50,000 CFUs of mixed alexis which was expected given his ileal conduit. On repeat evaluation in the ED on 20, CT scan of the abdomen was performed and revealed pyelonephritis. Pt was started on Zosyn and blood and urine cultures were repeated. He remained afebrile throughout his stay. Repeat blood and urine cultures were negative. Per ID recommendation, he will be discharged home on a seven day course of Levaquin. On day of discharge, pt is sitting up in bed in NAD. He is afebrile and feels back to baseline. He offers no complaints and is ready to return home. I dicussed discharge plans with his wife over the phone and she was in agreement with the plan. Time > 35 minutes coordinating discharge,  reviewing chart and patient data, examining and interviewing patient, and discussing with nursing staff, case management/social work, family, etc.     Maik Links this admission:  IP CONSULT TO HOSPITALIST  IP CONSULT TO INFECTIOUS DISEASES  The patient expressed appropriate understanding of and agreement with the discharge recommendations, medications, and plan.        DISCHARGE DIAGNOSES:   Principal Problem:    Pyelonephritis  Active Problems:    Coronary artery disease due to calcified BMP/CBC  Recent Labs     04/16/20  1628 04/17/20  0546 04/18/20  0555   * 136 140   K 3.8 4.2 3.9   CL 97* 98* 103   CO2 22 25 25   BUN 21* 17 17   CREATININE 0.9 0.9 0.8   WBC 6.1 5.6  --    HCT 39.2* 36.3*  --    * 142  --      IMAGING:   Ct Abdomen Pelvis W Iv Contrast Additional Contrast? None    Result Date: 4/16/2020  EXAMINATION: CT OF THE ABDOMEN AND PELVIS WITH CONTRAST 4/16/2020 6:07 pm TECHNIQUE: CT of the abdomen and pelvis was performed with the administration of intravenous contrast. Multiplanar reformatted images are provided for review. Dose modulation, iterative reconstruction, and/or weight based adjustment of the mA/kV was utilized to reduce the radiation dose to as low as reasonably achievable. COMPARISON: 06/18/2019 HISTORY: ORDERING SYSTEM PROVIDED HISTORY: R/O pyelonephritis TECHNOLOGIST PROVIDED HISTORY: Reason for exam:->R/O pyelonephritis Additional Contrast?->None Reason for Exam: Fever (pt afebrile upon arrival, sent by PCP), poss pyelonephritis Acuity: Acute Type of Exam: Initial FINDINGS: Lower Chest: The lung bases are clear and the heart size is normal.  There is calcific coronary artery disease primarily involving the left main and LAD. Ascending aorta is slightly ectatic. Organs: There is a 1 cm rim calcified gallstone. No pericholecystic fluid or fat stranding. Small cyst in the left lobe of the liver, unchanged. The liver, spleen, pancreas and adrenal glands are normal. There is bilateral renal cortical scarring and unchanged perinephric fat stranding. Focal diminished cortical enhancement in the posterior lower pole of the right kidney highly suspicious for acute pyelonephritis (axial image 95). No evidence of an abscess. There are unchanged bilateral renal cysts measuring up to 3.9 cm in the right kidney. There are a few small bilateral nonobstructing renal calculi and renal vascular calcifications.   There is mild urothelial thickening involving the renal

## 2020-04-18 NOTE — PROGRESS NOTES
Patient alert to self, sitting up on the side of the bed watching television. Patient denies n/v, diarrhea, SOB, pain. Patient UAL with standby assist, tolerating fairly well. Non-slip socks and shoes on at this time. Patient denies further needs at this time. Call light within reach. Will continue to monitor pt needs.

## 2020-04-20 LAB
BLOOD CULTURE, ROUTINE: NORMAL
CULTURE, BLOOD 2: NORMAL

## 2020-04-29 ENCOUNTER — CARE COORDINATION (OUTPATIENT)
Dept: CARE COORDINATION | Age: 80
End: 2020-04-29

## 2020-05-17 PROBLEM — N39.0 UTI (URINARY TRACT INFECTION): Status: RESOLVED | Noted: 2020-04-16 | Resolved: 2020-05-17

## 2021-01-12 PROBLEM — Z93.6 S/P ILEAL CONDUIT (HCC): Status: ACTIVE | Noted: 2021-01-12

## 2021-07-13 DIAGNOSIS — F02.80 LATE ONSET ALZHEIMER'S DISEASE WITHOUT BEHAVIORAL DISTURBANCE (HCC): ICD-10-CM

## 2021-07-13 DIAGNOSIS — E78.00 PURE HYPERCHOLESTEROLEMIA: ICD-10-CM

## 2021-07-13 DIAGNOSIS — I25.84 CORONARY ARTERY DISEASE DUE TO CALCIFIED CORONARY LESION: ICD-10-CM

## 2021-07-13 DIAGNOSIS — I25.10 CORONARY ARTERY DISEASE DUE TO CALCIFIED CORONARY LESION: ICD-10-CM

## 2021-07-13 DIAGNOSIS — G30.1 LATE ONSET ALZHEIMER'S DISEASE WITHOUT BEHAVIORAL DISTURBANCE (HCC): ICD-10-CM

## 2021-07-13 LAB
A/G RATIO: 1.6 (ref 1.1–2.2)
ALBUMIN SERPL-MCNC: 4.4 G/DL (ref 3.4–5)
ALP BLD-CCNC: 73 U/L (ref 40–129)
ALT SERPL-CCNC: 21 U/L (ref 10–40)
ANION GAP SERPL CALCULATED.3IONS-SCNC: 10 MMOL/L (ref 3–16)
AST SERPL-CCNC: 24 U/L (ref 15–37)
BILIRUB SERPL-MCNC: 0.5 MG/DL (ref 0–1)
BUN BLDV-MCNC: 11 MG/DL (ref 7–20)
CALCIUM SERPL-MCNC: 10.3 MG/DL (ref 8.3–10.6)
CHLORIDE BLD-SCNC: 105 MMOL/L (ref 99–110)
CHOLESTEROL, TOTAL: 113 MG/DL (ref 0–199)
CO2: 26 MMOL/L (ref 21–32)
CREAT SERPL-MCNC: 0.8 MG/DL (ref 0.8–1.3)
GFR AFRICAN AMERICAN: >60
GFR NON-AFRICAN AMERICAN: >60
GLOBULIN: 2.7 G/DL
GLUCOSE BLD-MCNC: 113 MG/DL (ref 70–99)
HDLC SERPL-MCNC: 48 MG/DL (ref 40–60)
LDL CHOLESTEROL CALCULATED: 53 MG/DL
POTASSIUM SERPL-SCNC: 4.7 MMOL/L (ref 3.5–5.1)
SODIUM BLD-SCNC: 141 MMOL/L (ref 136–145)
TOTAL PROTEIN: 7.1 G/DL (ref 6.4–8.2)
TRIGL SERPL-MCNC: 61 MG/DL (ref 0–150)
TSH SERPL DL<=0.05 MIU/L-ACNC: 1.84 UIU/ML (ref 0.27–4.2)
VITAMIN B-12: 658 PG/ML (ref 211–911)
VLDLC SERPL CALC-MCNC: 12 MG/DL

## 2022-07-12 LAB
BUN BLDV-MCNC: 14 MG/DL (ref 7–20)
CREAT SERPL-MCNC: 0.8 MG/DL (ref 0.8–1.3)
GFR AFRICAN AMERICAN: >60
GFR NON-AFRICAN AMERICAN: >60
PROSTATE SPECIFIC ANTIGEN: <0.01 NG/ML (ref 0–4)

## 2022-07-22 ENCOUNTER — HOSPITAL ENCOUNTER (OUTPATIENT)
Dept: CT IMAGING | Age: 82
Discharge: HOME | End: 2022-07-22
Payer: MEDICARE

## 2022-07-22 DIAGNOSIS — N28.1 ACQUIRED CYST OF KIDNEY: ICD-10-CM

## 2022-07-22 DIAGNOSIS — N30.00 ACUTE CYSTITIS WITHOUT HEMATURIA: ICD-10-CM

## 2022-07-22 PROCEDURE — 6360000004 HC RX CONTRAST MEDICATION: Performed by: INTERNAL MEDICINE

## 2022-07-22 PROCEDURE — 74178 CT ABD&PLV WO CNTR FLWD CNTR: CPT | Performed by: UROLOGY

## 2022-07-22 RX ADMIN — IOPAMIDOL 75 ML: 755 INJECTION, SOLUTION INTRAVENOUS at 11:32

## 2022-07-28 LAB
HCT VFR BLD CALC: 41.1 % (ref 40.5–52.5)
HEMOGLOBIN: 13.8 G/DL (ref 13.5–17.5)
MCH RBC QN AUTO: 29.3 PG (ref 26–34)
MCHC RBC AUTO-ENTMCNC: 33.6 G/DL (ref 31–36)
MCV RBC AUTO: 87.1 FL (ref 80–100)
PDW BLD-RTO: 13.3 % (ref 12.4–15.4)
PLATELET # BLD: 215 K/UL (ref 135–450)
PMV BLD AUTO: 8 FL (ref 5–10.5)
RBC # BLD: 4.72 M/UL (ref 4.2–5.9)
WBC # BLD: 6.5 K/UL (ref 4–11)

## 2022-08-03 ENCOUNTER — HOSPITAL ENCOUNTER (OUTPATIENT)
Dept: GENERAL RADIOLOGY | Age: 82
Discharge: HOME | End: 2022-08-03
Payer: MEDICARE

## 2022-08-03 DIAGNOSIS — R31.0 GROSS HEMATURIA: ICD-10-CM

## 2022-08-03 PROCEDURE — 6360000004 HC RX CONTRAST MEDICATION: Performed by: UROLOGY

## 2022-08-03 PROCEDURE — 2709999900 FL LOOPOGRAM

## 2022-08-03 RX ADMIN — IOPAMIDOL 50 ML: 755 INJECTION, SOLUTION INTRAVENOUS at 10:44

## 2022-09-29 RX ORDER — DIVALPROEX SODIUM 125 MG/1
CAPSULE, COATED PELLETS ORAL
COMMUNITY
Start: 2022-07-13

## 2022-09-29 NOTE — FLOWSHEET NOTE
Preoperative Screening for Elective Surgery/Invasive Procedures While COVID-19 present in the community     Have you tested positive or have been told to self-isolate for COVID-19 like symptoms within the past 28 days? Do you currently have any of the following symptoms? Fever >100.0 F or 99.9 F in immunocompromised patients? New onset cough, shortness of breath or difficulty breathing? New onset sore throat, myalgia (muscle aches and pains), headache, loss of taste/smell or diarrhea? Have you had a potential exposure to COVID-19 within the past 14 days by:  Close contact with a confirmed case? Close contact with a healthcare worker,  or essential infrastructure worker (grocery store, [employer], gas station, public utilities or transportation)? Do you reside in a congregate setting such as; skilled nursing facility, adult home, correctional facility, homeless shelter or other institutional setting? Have you had recent travel to a known COVID-19 hotspot? No to all above     * Admitted with diarrhea? [] YES    [x]  NO     *Prior history of C-Diff. In last 3 months? [] YES    [x]  NO     *Antibiotic use in the past 6-8 weeks? [x]  NO    []  YES      If yes, which: REASON_________________     *Prior hospitalization or nursing home in the last month? []  YES    [x]  NO     SAFETY FIRST. .call before you fall    [facility] time___10/4/22 1100_________        Surgery time__1230__________    Do not eat or drink anything after 12:00 midnight prior to your surgery. This includes water chewing gum, mints and ice chips- the Day of Surgery. You may brush your teeth and gargle the morning of your surgery, but do not swallow the water     Please see your family doctor/pediatrician for a history and physical and/or questions concerning medications. Bring any test results/reports from your physicians office.    If you are under the care of a heart doctor or specialist doctor, please be aware that you may be asked to them for clearance    You may be asked to stop blood thinners such as Coumadin, Plavix, Fragmin, Lovenox, etc., or any anti-inflammatories such as:  Aspirin, Ibuprofen, Advil, Naproxen prior to your surgery. We also ask that you stop any OTC medications such as fish oil, vitamin E, glucosamine, garlic, Multivitamins, COQ 10, etc.    We ask that you do not smoke 24 hours prior to surgery  We ask that you do not  drink any alcoholic beverages 24 hours prior to surgery     You must make arrangements for a responsible adult to take you home after your surgery. For your safety you will not be allowed to leave alone or drive yourself home. Your surgery will be cancelled if you do not have a ride home. Also for your safety, it is strongly suggested that someone stay with you the first 24 hours after your surgery. A parent or legal guardian must accompany a child scheduled for surgery and plan to stay at the hospital until the child is discharged. Please do not bring other children with you. For your comfort, please wear simple loose fitting clothing to the hospital.  Please do not bring valuables. Do not wear any make-up or nail polish on your fingers or toes. For your safety, please do not wear any jewelry or body piercing's on the day of surgery. All jewelry must be removed. If you have dentures, they will be removed before going to operating room. For your convenience, we will provide you with a container. If you wear contact lenses or glasses, they will be removed, please bring a case for them. If you have a living will and a durable power of  for healthcare, please bring in a copy.      As part of our patient safety program to minimize surgical site infections, we ask you to do the following:    Please notify your surgeon if you develop any illness between         now and the day of your surgery. This includes a cough, cold, fever, sore throat, nausea,         or vomiting, and diarrhea, etc.   Please notify your surgeon if you experience dizziness, shortness         of breath or blurred vision between now and the time of your surgery. Do not shave your operative site 96 hours prior to surgery. For face and neck surgery, men may use an electric razor 48 hours   prior to surgery. You may shower the night before surgery or the morning of   your surgery with an antibacterial soap. You will need to bring a photo ID and insurance card     If you use a C-pap or Bi-pap machine, please bring your machine with you to the hospital     Our goal is to provide you with excellent care, therefore, visitors will be limited to so that we may focus on providing this care for you. Please contact your surgeon office, if you have any further questions. [facility] phone number:  [phone] [facility] fax number:  [phone]    Please note these are generalized instructions for all surgical cases, you may be provided with more specific instructions according to your surgery.

## 2022-10-03 ENCOUNTER — ANESTHESIA EVENT (OUTPATIENT)
Dept: OPERATING ROOM | Age: 82
DRG: 856 | End: 2022-10-03
Payer: MEDICARE

## 2022-10-04 ENCOUNTER — ANESTHESIA (OUTPATIENT)
Dept: OPERATING ROOM | Age: 82
DRG: 856 | End: 2022-10-04
Payer: MEDICARE

## 2022-10-04 ENCOUNTER — APPOINTMENT (OUTPATIENT)
Dept: GENERAL RADIOLOGY | Age: 82
DRG: 856 | End: 2022-10-04
Attending: UROLOGY
Payer: MEDICARE

## 2022-10-04 ENCOUNTER — HOSPITAL ENCOUNTER (INPATIENT)
Age: 82
LOS: 3 days | Discharge: HOME | DRG: 856 | End: 2022-10-07
Attending: UROLOGY | Admitting: HOSPITALIST
Payer: MEDICARE

## 2022-10-04 DIAGNOSIS — R31.0 GROSS HEMATURIA: ICD-10-CM

## 2022-10-04 PROBLEM — R09.02 HYPOXIA: Status: ACTIVE | Noted: 2022-10-04

## 2022-10-04 LAB
ANION GAP SERPL CALCULATED.3IONS-SCNC: 12 MMOL/L (ref 3–16)
BUN BLDV-MCNC: 18 MG/DL (ref 7–20)
CALCIUM SERPL-MCNC: 9.2 MG/DL (ref 8.3–10.6)
CHLORIDE BLD-SCNC: 105 MMOL/L (ref 99–110)
CO2: 21 MMOL/L (ref 21–32)
CREAT SERPL-MCNC: 1 MG/DL (ref 0.8–1.3)
GFR AFRICAN AMERICAN: >60
GFR NON-AFRICAN AMERICAN: >60
GLUCOSE BLD-MCNC: 151 MG/DL (ref 70–99)
POTASSIUM SERPL-SCNC: 4.1 MMOL/L (ref 3.5–5.1)
SODIUM BLD-SCNC: 138 MMOL/L (ref 136–145)

## 2022-10-04 PROCEDURE — 2700000000 HC OXYGEN THERAPY PER DAY

## 2022-10-04 PROCEDURE — 6370000000 HC RX 637 (ALT 250 FOR IP): Performed by: HOSPITALIST

## 2022-10-04 PROCEDURE — 3700000001 HC ADD 15 MINUTES (ANESTHESIA): Performed by: UROLOGY

## 2022-10-04 PROCEDURE — 3600000013 HC SURGERY LEVEL 3 ADDTL 15MIN: Performed by: UROLOGY

## 2022-10-04 PROCEDURE — 71045 X-RAY EXAM CHEST 1 VIEW: CPT

## 2022-10-04 PROCEDURE — 0DBB4ZX EXCISION OF ILEUM, PERCUTANEOUS ENDOSCOPIC APPROACH, DIAGNOSTIC: ICD-10-PCS | Performed by: UROLOGY

## 2022-10-04 PROCEDURE — 0W3P4ZZ CONTROL BLEEDING IN GASTROINTESTINAL TRACT, PERCUTANEOUS ENDOSCOPIC APPROACH: ICD-10-PCS | Performed by: UROLOGY

## 2022-10-04 PROCEDURE — C1769 GUIDE WIRE: HCPCS | Performed by: UROLOGY

## 2022-10-04 PROCEDURE — 6360000002 HC RX W HCPCS

## 2022-10-04 PROCEDURE — 1200000000 HC SEMI PRIVATE

## 2022-10-04 PROCEDURE — 94761 N-INVAS EAR/PLS OXIMETRY MLT: CPT

## 2022-10-04 PROCEDURE — 2500000003 HC RX 250 WO HCPCS

## 2022-10-04 PROCEDURE — 2580000003 HC RX 258: Performed by: UROLOGY

## 2022-10-04 PROCEDURE — 2580000003 HC RX 258: Performed by: ANESTHESIOLOGY

## 2022-10-04 PROCEDURE — 3700000000 HC ANESTHESIA ATTENDED CARE: Performed by: UROLOGY

## 2022-10-04 PROCEDURE — 88305 TISSUE EXAM BY PATHOLOGIST: CPT

## 2022-10-04 PROCEDURE — 6360000002 HC RX W HCPCS: Performed by: UROLOGY

## 2022-10-04 PROCEDURE — 2580000003 HC RX 258: Performed by: HOSPITALIST

## 2022-10-04 PROCEDURE — 7100000000 HC PACU RECOVERY - FIRST 15 MIN: Performed by: UROLOGY

## 2022-10-04 PROCEDURE — 7100000001 HC PACU RECOVERY - ADDTL 15 MIN: Performed by: UROLOGY

## 2022-10-04 PROCEDURE — 3600000003 HC SURGERY LEVEL 3 BASE: Performed by: UROLOGY

## 2022-10-04 PROCEDURE — 36415 COLL VENOUS BLD VENIPUNCTURE: CPT

## 2022-10-04 PROCEDURE — 80048 BASIC METABOLIC PNL TOTAL CA: CPT

## 2022-10-04 PROCEDURE — 2500000003 HC RX 250 WO HCPCS: Performed by: STUDENT IN AN ORGANIZED HEALTH CARE EDUCATION/TRAINING PROGRAM

## 2022-10-04 PROCEDURE — 2709999900 HC NON-CHARGEABLE SUPPLY: Performed by: UROLOGY

## 2022-10-04 RX ORDER — SODIUM CHLORIDE 0.9 % (FLUSH) 0.9 %
5-40 SYRINGE (ML) INJECTION EVERY 12 HOURS SCHEDULED
Status: DISCONTINUED | OUTPATIENT
Start: 2022-10-04 | End: 2022-10-04 | Stop reason: HOSPADM

## 2022-10-04 RX ORDER — VITAMIN B COMPLEX
1000 TABLET ORAL DAILY
Status: DISCONTINUED | OUTPATIENT
Start: 2022-10-05 | End: 2022-10-07 | Stop reason: HOSPADM

## 2022-10-04 RX ORDER — GLYCOPYRROLATE 0.2 MG/ML
INJECTION INTRAMUSCULAR; INTRAVENOUS PRN
Status: DISCONTINUED | OUTPATIENT
Start: 2022-10-04 | End: 2022-10-04 | Stop reason: SDUPTHER

## 2022-10-04 RX ORDER — SODIUM CHLORIDE 0.9 % (FLUSH) 0.9 %
10 SYRINGE (ML) INJECTION PRN
Status: DISCONTINUED | OUTPATIENT
Start: 2022-10-04 | End: 2022-10-07 | Stop reason: HOSPADM

## 2022-10-04 RX ORDER — ROSUVASTATIN CALCIUM 20 MG/1
20 TABLET, COATED ORAL NIGHTLY
Status: DISCONTINUED | OUTPATIENT
Start: 2022-10-04 | End: 2022-10-07 | Stop reason: HOSPADM

## 2022-10-04 RX ORDER — LABETALOL HYDROCHLORIDE 5 MG/ML
10 INJECTION, SOLUTION INTRAVENOUS
Status: DISCONTINUED | OUTPATIENT
Start: 2022-10-04 | End: 2022-10-04 | Stop reason: HOSPADM

## 2022-10-04 RX ORDER — POLYETHYLENE GLYCOL 3350 17 G/17G
17 POWDER, FOR SOLUTION ORAL DAILY PRN
Status: DISCONTINUED | OUTPATIENT
Start: 2022-10-04 | End: 2022-10-07 | Stop reason: HOSPADM

## 2022-10-04 RX ORDER — ACETAMINOPHEN 325 MG/1
650 TABLET ORAL EVERY 6 HOURS PRN
Status: DISCONTINUED | OUTPATIENT
Start: 2022-10-04 | End: 2022-10-07 | Stop reason: HOSPADM

## 2022-10-04 RX ORDER — SODIUM CHLORIDE 9 MG/ML
INJECTION, SOLUTION INTRAVENOUS CONTINUOUS
Status: DISCONTINUED | OUTPATIENT
Start: 2022-10-04 | End: 2022-10-04 | Stop reason: HOSPADM

## 2022-10-04 RX ORDER — CITALOPRAM 20 MG/1
20 TABLET ORAL DAILY
Status: DISCONTINUED | OUTPATIENT
Start: 2022-10-05 | End: 2022-10-07 | Stop reason: HOSPADM

## 2022-10-04 RX ORDER — FENTANYL CITRATE 50 UG/ML
50 INJECTION, SOLUTION INTRAMUSCULAR; INTRAVENOUS EVERY 5 MIN PRN
Status: DISCONTINUED | OUTPATIENT
Start: 2022-10-04 | End: 2022-10-04 | Stop reason: HOSPADM

## 2022-10-04 RX ORDER — SODIUM CHLORIDE 0.9 % (FLUSH) 0.9 %
5-40 SYRINGE (ML) INJECTION PRN
Status: DISCONTINUED | OUTPATIENT
Start: 2022-10-04 | End: 2022-10-04 | Stop reason: HOSPADM

## 2022-10-04 RX ORDER — OLANZAPINE 5 MG/1
5 TABLET ORAL 2 TIMES DAILY
Status: DISCONTINUED | OUTPATIENT
Start: 2022-10-04 | End: 2022-10-07 | Stop reason: HOSPADM

## 2022-10-04 RX ORDER — SODIUM CHLORIDE 9 MG/ML
INJECTION, SOLUTION INTRAVENOUS PRN
Status: DISCONTINUED | OUTPATIENT
Start: 2022-10-04 | End: 2022-10-04 | Stop reason: HOSPADM

## 2022-10-04 RX ORDER — ONDANSETRON 2 MG/ML
4 INJECTION INTRAMUSCULAR; INTRAVENOUS
Status: DISCONTINUED | OUTPATIENT
Start: 2022-10-04 | End: 2022-10-04 | Stop reason: HOSPADM

## 2022-10-04 RX ORDER — FENTANYL CITRATE 50 UG/ML
25 INJECTION, SOLUTION INTRAMUSCULAR; INTRAVENOUS EVERY 5 MIN PRN
Status: DISCONTINUED | OUTPATIENT
Start: 2022-10-04 | End: 2022-10-04 | Stop reason: HOSPADM

## 2022-10-04 RX ORDER — PROCHLORPERAZINE EDISYLATE 5 MG/ML
5 INJECTION INTRAMUSCULAR; INTRAVENOUS
Status: DISCONTINUED | OUTPATIENT
Start: 2022-10-04 | End: 2022-10-04 | Stop reason: HOSPADM

## 2022-10-04 RX ORDER — ACETAMINOPHEN 650 MG/1
650 SUPPOSITORY RECTAL EVERY 6 HOURS PRN
Status: DISCONTINUED | OUTPATIENT
Start: 2022-10-04 | End: 2022-10-07 | Stop reason: HOSPADM

## 2022-10-04 RX ORDER — POTASSIUM CHLORIDE 7.45 MG/ML
10 INJECTION INTRAVENOUS PRN
Status: DISCONTINUED | OUTPATIENT
Start: 2022-10-04 | End: 2022-10-07 | Stop reason: HOSPADM

## 2022-10-04 RX ORDER — SODIUM CHLORIDE 0.9 % (FLUSH) 0.9 %
5-40 SYRINGE (ML) INJECTION EVERY 12 HOURS SCHEDULED
Status: DISCONTINUED | OUTPATIENT
Start: 2022-10-04 | End: 2022-10-07 | Stop reason: HOSPADM

## 2022-10-04 RX ORDER — SODIUM CHLORIDE 9 MG/ML
INJECTION, SOLUTION INTRAVENOUS PRN
Status: DISCONTINUED | OUTPATIENT
Start: 2022-10-04 | End: 2022-10-07 | Stop reason: HOSPADM

## 2022-10-04 RX ORDER — ASPIRIN 81 MG/1
81 TABLET ORAL DAILY
Status: DISCONTINUED | OUTPATIENT
Start: 2022-10-05 | End: 2022-10-07 | Stop reason: HOSPADM

## 2022-10-04 RX ORDER — ONDANSETRON 4 MG/1
4 TABLET, ORALLY DISINTEGRATING ORAL EVERY 8 HOURS PRN
Status: DISCONTINUED | OUTPATIENT
Start: 2022-10-04 | End: 2022-10-07 | Stop reason: HOSPADM

## 2022-10-04 RX ORDER — IPRATROPIUM BROMIDE AND ALBUTEROL SULFATE 2.5; .5 MG/3ML; MG/3ML
1 SOLUTION RESPIRATORY (INHALATION)
Status: DISCONTINUED | OUTPATIENT
Start: 2022-10-04 | End: 2022-10-04 | Stop reason: HOSPADM

## 2022-10-04 RX ORDER — DIVALPROEX SODIUM 125 MG/1
125 CAPSULE, COATED PELLETS ORAL DAILY
Status: DISCONTINUED | OUTPATIENT
Start: 2022-10-04 | End: 2022-10-07 | Stop reason: HOSPADM

## 2022-10-04 RX ORDER — SODIUM CHLORIDE 9 MG/ML
INJECTION, SOLUTION INTRAVENOUS CONTINUOUS
Status: DISCONTINUED | OUTPATIENT
Start: 2022-10-04 | End: 2022-10-07

## 2022-10-04 RX ORDER — PROPOFOL 10 MG/ML
INJECTION, EMULSION INTRAVENOUS CONTINUOUS PRN
Status: DISCONTINUED | OUTPATIENT
Start: 2022-10-04 | End: 2022-10-04 | Stop reason: SDUPTHER

## 2022-10-04 RX ORDER — POTASSIUM CHLORIDE 20 MEQ/1
40 TABLET, EXTENDED RELEASE ORAL PRN
Status: DISCONTINUED | OUTPATIENT
Start: 2022-10-04 | End: 2022-10-07 | Stop reason: HOSPADM

## 2022-10-04 RX ORDER — LIDOCAINE HYDROCHLORIDE 20 MG/ML
INJECTION, SOLUTION EPIDURAL; INFILTRATION; INTRACAUDAL; PERINEURAL PRN
Status: DISCONTINUED | OUTPATIENT
Start: 2022-10-04 | End: 2022-10-04 | Stop reason: SDUPTHER

## 2022-10-04 RX ORDER — PROPOFOL 10 MG/ML
INJECTION, EMULSION INTRAVENOUS PRN
Status: DISCONTINUED | OUTPATIENT
Start: 2022-10-04 | End: 2022-10-04 | Stop reason: SDUPTHER

## 2022-10-04 RX ORDER — ONDANSETRON 2 MG/ML
INJECTION INTRAMUSCULAR; INTRAVENOUS PRN
Status: DISCONTINUED | OUTPATIENT
Start: 2022-10-04 | End: 2022-10-04 | Stop reason: SDUPTHER

## 2022-10-04 RX ORDER — ONDANSETRON 2 MG/ML
4 INJECTION INTRAMUSCULAR; INTRAVENOUS EVERY 6 HOURS PRN
Status: DISCONTINUED | OUTPATIENT
Start: 2022-10-04 | End: 2022-10-07 | Stop reason: HOSPADM

## 2022-10-04 RX ORDER — FENTANYL CITRATE 50 UG/ML
INJECTION, SOLUTION INTRAMUSCULAR; INTRAVENOUS PRN
Status: DISCONTINUED | OUTPATIENT
Start: 2022-10-04 | End: 2022-10-04 | Stop reason: SDUPTHER

## 2022-10-04 RX ORDER — DEXAMETHASONE SODIUM PHOSPHATE 4 MG/ML
INJECTION, SOLUTION INTRA-ARTICULAR; INTRALESIONAL; INTRAMUSCULAR; INTRAVENOUS; SOFT TISSUE PRN
Status: DISCONTINUED | OUTPATIENT
Start: 2022-10-04 | End: 2022-10-04 | Stop reason: SDUPTHER

## 2022-10-04 RX ORDER — HYDRALAZINE HYDROCHLORIDE 20 MG/ML
10 INJECTION INTRAMUSCULAR; INTRAVENOUS
Status: DISCONTINUED | OUTPATIENT
Start: 2022-10-04 | End: 2022-10-04 | Stop reason: HOSPADM

## 2022-10-04 RX ORDER — MAGNESIUM HYDROXIDE 1200 MG/15ML
LIQUID ORAL
Status: COMPLETED | OUTPATIENT
Start: 2022-10-04 | End: 2022-10-04

## 2022-10-04 RX ADMIN — FENTANYL CITRATE 25 MCG: 50 INJECTION INTRAMUSCULAR; INTRAVENOUS at 14:03

## 2022-10-04 RX ADMIN — SODIUM CHLORIDE: 9 INJECTION, SOLUTION INTRAVENOUS at 18:08

## 2022-10-04 RX ADMIN — PROPOFOL 20 MG: 10 INJECTION, EMULSION INTRAVENOUS at 14:04

## 2022-10-04 RX ADMIN — LABETALOL HYDROCHLORIDE 10 MG: 5 INJECTION INTRAVENOUS at 14:47

## 2022-10-04 RX ADMIN — DEXAMETHASONE SODIUM PHOSPHATE 8 MG: 4 INJECTION, SOLUTION INTRAMUSCULAR; INTRAVENOUS at 13:45

## 2022-10-04 RX ADMIN — GLYCOPYRROLATE 0.1 MG: 0.2 INJECTION, SOLUTION INTRAMUSCULAR; INTRAVENOUS at 14:06

## 2022-10-04 RX ADMIN — FENTANYL CITRATE 25 MCG: 50 INJECTION INTRAMUSCULAR; INTRAVENOUS at 13:51

## 2022-10-04 RX ADMIN — LIDOCAINE HYDROCHLORIDE 60 MG: 20 INJECTION, SOLUTION EPIDURAL; INFILTRATION; INTRACAUDAL; PERINEURAL at 13:42

## 2022-10-04 RX ADMIN — PROPOFOL 140 MCG/KG/MIN: 10 INJECTION, EMULSION INTRAVENOUS at 13:42

## 2022-10-04 RX ADMIN — FENTANYL CITRATE 25 MCG: 50 INJECTION INTRAMUSCULAR; INTRAVENOUS at 14:15

## 2022-10-04 RX ADMIN — DIVALPROEX SODIUM 125 MG: 125 CAPSULE ORAL at 19:06

## 2022-10-04 RX ADMIN — ACETAMINOPHEN 650 MG: 650 SUPPOSITORY RECTAL at 18:33

## 2022-10-04 RX ADMIN — CEFTRIAXONE 2000 MG: 2 INJECTION, POWDER, FOR SOLUTION INTRAMUSCULAR; INTRAVENOUS at 13:36

## 2022-10-04 RX ADMIN — ONDANSETRON 4 MG: 2 INJECTION INTRAMUSCULAR; INTRAVENOUS at 13:45

## 2022-10-04 RX ADMIN — PROPOFOL 20 MG: 10 INJECTION, EMULSION INTRAVENOUS at 13:51

## 2022-10-04 RX ADMIN — FENTANYL CITRATE 25 MCG: 50 INJECTION INTRAMUSCULAR; INTRAVENOUS at 14:01

## 2022-10-04 RX ADMIN — PROPOFOL 10 MG: 10 INJECTION, EMULSION INTRAVENOUS at 14:06

## 2022-10-04 RX ADMIN — SODIUM CHLORIDE: 9 INJECTION, SOLUTION INTRAVENOUS at 11:31

## 2022-10-04 RX ADMIN — GLYCOPYRROLATE 0.1 MG: 0.2 INJECTION, SOLUTION INTRAMUSCULAR; INTRAVENOUS at 14:01

## 2022-10-04 RX ADMIN — PROPOFOL 50 MG: 10 INJECTION, EMULSION INTRAVENOUS at 13:42

## 2022-10-04 ASSESSMENT — LIFESTYLE VARIABLES
HOW MANY STANDARD DRINKS CONTAINING ALCOHOL DO YOU HAVE ON A TYPICAL DAY: 1 OR 2
SMOKING_STATUS: 0
HOW OFTEN DO YOU HAVE A DRINK CONTAINING ALCOHOL: 2-4 TIMES A MONTH

## 2022-10-04 ASSESSMENT — PAIN - FUNCTIONAL ASSESSMENT: PAIN_FUNCTIONAL_ASSESSMENT: 0-10

## 2022-10-04 ASSESSMENT — PAIN SCALES - GENERAL
PAINLEVEL_OUTOF10: 0
PAINLEVEL_OUTOF10: 0

## 2022-10-04 NOTE — PROGRESS NOTES
To pacu from OR. PT asleep. Ileal conduit draining light pink urine. Abd soft. IV infusing. Monitor in sinus tachycardia.

## 2022-10-04 NOTE — ANESTHESIA PRE PROCEDURE
Department of Anesthesiology  Preprocedure Note       Name:  Yuri Dunne   Age:  81 y.o.  :  1940                                          MRN:  [id number]         Date:  10/4/2022      Surgeon: Arlette Villatoro):  Shena Lee MD    Procedure: Procedure(s):  CYSTOSCOPY    Medications prior to admission:   Prior to Admission medications    Medication Sig Start Date End Date Taking? Authorizing Provider   divalproex (DEPAKOTE SPRINKLE) 125 MG capsule TAKE ONE CAPSULE BY MOUTH DAILY 22   Historical Provider, MD   OLANZapine (ZYPREXA) 5 MG tablet Take 5 mg by mouth 2 times daily 3/11/22   Historical Provider, MD   rosuvastatin (CRESTOR) 20 MG tablet TAKE ONE TABLET BY MOUTH EVERY NIGHT AT BEDTIME 22   Driss Todd MD   citalopram (CELEXA) 20 MG tablet Take 20 mg by mouth daily 18   Historical Provider, MD   Vitamin D (CHOLECALCIFEROL) 1000 UNITS CAPS capsule Take 1,000 Units by mouth daily. Historical Provider, MD   aspirin 81 MG EC tablet Take 81 mg by mouth daily. Historical Provider, MD       Current medications:    Current Facility-Administered Medications   Medication Dose Route Frequency Provider Last Rate Last Admin    cefTRIAXone (ROCEPHIN) 2,000 mg in dextrose 5 % 50 mL IVPB mini-bag  2,000 mg IntraVENous Once Shena Lee MD        0.9 % sodium chloride infusion   IntraVENous Continuous Felix Chatman MD 75 mL/hr at 10/04/22 1131 New Bag at 10/04/22 1131    sodium chloride flush 0.9 % injection 5-40 mL  5-40 mL IntraVENous 2 times per day Felix Chatman MD        sodium chloride flush 0.9 % injection 5-40 mL  5-40 mL IntraVENous PRN Felix Chatman MD        0.9 % sodium chloride infusion   IntraVENous PRN Felix Chatman MD           Allergies:     Allergies   Allergen Reactions    Aricept [Donepezil Hcl]      Agitation    Namenda [Memantine Hcl]      Agitation       Problem List:    Patient Active Problem List   Diagnosis Code  Coronary artery disease due to calcified coronary lesion I25.10, I25.84    Pure hypercholesterolemia E78.00    Hyperglycemia R73.9    Late onset Alzheimer's disease without behavioral disturbance (HCC) G30.1, F02.80    Acute cystitis N30.00    Pyelonephritis N12    S/P ileal conduit (HCC) Z93.6       Past Medical History:        Diagnosis Date    Bladder [facility])     Dr Sukhdeep Mays Blood in urine 2022    CAD (coronary artery disease) 2010    cardiac cath- non obstructive    Colon polyps 2006    by colonoscopy    Dementia ([facility] [address] 2017    Hyperlipidemia        Past Surgical History:        Procedure Laterality Date    BLADDER REMOVAL      with ileal conduit    COLONOSCOPY  2010    Due in     COLONOSCOPY  10/05/2016    Ghastine    OTHER SURGICAL HISTORY  2013, 2014    Ileal conduit dilatation [facility])    TIBIA FRACTURE SURGERY Right        Social History:    Social History     Tobacco Use    Smoking status: Former     Packs/day: 1.00     Years: 12.00     Pack years: 12.00     Types: Cigarettes     Quit date: 1969     Years since quittin.7    Smokeless tobacco: Never   Substance Use Topics    Alcohol use: Yes     Comment: occasionally (12pack on weekend)                                Counseling given: Not Answered      Vital Signs (Current):   Vitals:    22 1006 10/04/22 1119   BP:  (!) 152/82   Pulse:  61   Resp:  18   Temp:  96.8 °F (36 °C)   TempSrc:  Temporal   SpO2:  95%   Weight: 213 lb (96.6 kg) 212 lb 3.2 oz (96.3 kg)   Height: 5' 8\" (1.727 m) 5' 8\" (1.727 m)                                              BP Readings from Last 3 Encounters:   10/04/22 (!) 152/82   22 112/70   10/18/21 130/62       NPO Status: Time of last liquid consumption: 0900                        Time of last solid consumption: 2300                        Date of last liquid consumption: 10/04/22                        Date of last solid food consumption: 10/03/22    BMI:   Wt Readings from Last 3 Encounters:   10/04/22 212 lb 3.2 oz (96.3 kg)   04/22/22 213 lb (96.6 kg)   10/18/21 205 lb 6.4 oz (93.2 kg)     Body mass index is 32.26 kg/m². CBC:   Lab Results   Component Value Date/Time    WBC 6.5 07/28/2022 01:23 PM    RBC 4.72 07/28/2022 01:23 PM    HGB 13.8 07/28/2022 01:23 PM    HCT 41.1 07/28/2022 01:23 PM    MCV 87.1 07/28/2022 01:23 PM    RDW 13.3 07/28/2022 01:23 PM     07/28/2022 01:23 PM       CMP:   Lab Results   Component Value Date/Time     04/29/2022 07:59 AM    K 4.1 04/29/2022 07:59 AM    K 3.9 04/18/2020 05:55 AM     04/29/2022 07:59 AM    CO2 26 04/29/2022 07:59 AM    BUN 14 07/12/2022 12:29 PM    CREATININE 0.8 07/12/2022 12:29 PM    GFRAA >60 07/12/2022 12:29 PM    GFRAA >60 04/16/2013 11:28 AM    AGRATIO 1.6 07/13/2021 10:36 AM    LABGLOM >60 07/12/2022 12:29 PM    LABGLOM 88.9 05/10/2011 10:05 AM    GLUCOSE 123 04/29/2022 07:59 AM    GLUCOSE 118 05/10/2011 10:05 AM    PROT 7.1 07/13/2021 10:36 AM    PROT 7.1 09/26/2012 07:45 AM    CALCIUM 10.0 04/29/2022 07:59 AM    BILITOT 0.5 07/13/2021 10:36 AM    ALKPHOS 73 07/13/2021 10:36 AM    AST 24 07/13/2021 10:36 AM    ALT 21 07/13/2021 10:36 AM       POC Tests: No results for input(s): POCGLU, POCNA, POCK, POCCL, POCBUN, POCHEMO, POCHCT in the last 72 hours.     Coags: No results found for: PROTIME, INR, APTT    HCG (If Applicable): No results found for: PREGTESTUR, PREGSERUM, HCG, HCGQUANT     ABGs: No results found for: PHART, PO2ART, PNH5JRV, BQN8FBG, BEART, G3QZYRBC     Type & Screen (If Applicable):  No results found for: LABABO, LABRH    Drug/Infectious Status (If Applicable):  No results found for: HIV, HEPCAB    COVID-19 Screening (If Applicable):   Lab Results   Component Value Date/Time    COVID19 Not Detected 04/14/2020 12:24 PM           Anesthesia Evaluation   no history of anesthetic complications:   Airway: Mallampati: III  TM distance: >3 FB     Mouth opening: > = 3 FB   Dental:      Comment: Denies loose teeth    Pulmonary:       (-) COPD, asthma, rhonchi, wheezes, rales and not a current smoker                           Cardiovascular:  Exercise tolerance: no interval change,   (+) CAD:,     (-) orthopnea,  ACOSTA, murmur, weak pulses and peripheral edema      Rhythm: regular  Rate: normal                    Neuro/Psych:   (+) dementia (Alzheimers)   (-) seizures, TIA and CVA           GI/Hepatic/Renal:   (+) renal disease:,      (-) liver disease and no morbid obesity       Endo/Other:    (+) malignancy/cancer (Bladder, s/p ileal conduit). (-) diabetes mellitus (HgbA1c: 5.9%)                ROS comment: Presenting with gross hematuria, blood tinged urine noted in bag from ileal conduit    Past CT Urogram  1. Remote history of cystectomy, prostatectomy and ileal loop urinary conduit. 2. Physiologic dilation of bilateral collecting systems, likely a normal finding following above surgery.  There is no evidence of inflammation or obstruction otherwise. 3. Bilateral renal cysts and nonobstructing calculi on the right, stable from prior imaging. Abdominal:   (+) obese,           Vascular: Other Findings: Very pleasant, intermittently confused          Anesthesia Plan      MAC     ASA 3     (NPO appropriate per patient and wife at bedside. )        Anesthetic plan and risks discussed with patient. Plan discussed with CRNA. This pre-anesthesia assessment may be used as a history and physical.    DOS STAFF ADDENDUM:    Pt seen and examined, chart reviewed (including anesthesia, drug and allergy history). No interval changes to history and physical examination. Anesthetic plan, risks, benefits, alternatives, and personnel involved discussed with patient. Patient verbalized an understanding and agrees to proceed.       Dakota Bates MD  October 4, 2022  12:52 PM

## 2022-10-04 NOTE — H&P
tablet Take 20 mg by mouth daily 6/11/18   Historical Provider, MD   Vitamin D (CHOLECALCIFEROL) 1000 UNITS CAPS capsule Take 1,000 Units by mouth daily. Historical Provider, MD       Allergies:  Aricept [donepezil hcl] and Namenda [memantine hcl]      Social History:   TOBACCO:   reports that he quit smoking about 53 years ago. His smoking use included cigarettes. He has a 12.00 pack-year smoking history. He has never used smokeless tobacco.  ETOH:   reports current alcohol use. Family History:       Problem Relation Age of Onset    Cancer Father         Lung    Breast Cancer Sister     Cancer Sister         Breast    Diabetes Brother     Cancer Brother         leukemia    Cancer Brother 47        Leukemia           REVIEW OF SYSTEMS:     Unable to do review of systems due to patient's mental status    CONSTITUTIONAL:      fatigue, fever, chills or night sweats, recent weight gain, recent wt loss, insomnia,  General weakness, poor appetite, muscle aches and pains    HEAD: headache, dizziness    EYES:      blurriness,  double vision, dryness,  discharge, irritation,diplopia    EARS:      hearing loss, vertigo, ear discharge,  Earache. Ringing in the ears. NOSE:      Rhinorrhea, sneezing, epistaxis. Discharge, sinusitis,     MOUTH/THROAT:         sore throat, mouth ulcers, Hoarseness    RESPIRATORY:        Shortness of breath, wheezing,  cough, sputum, hemoptysis, obstructive sleep apnea,    CARDIOVASCULAR :      chest pain, palpitations, dyspnea on exercise, Lower extrimity edema (swelling),     GASTROINTESTINAL:       Dysphagia, Poor appetite,  Nausea, Vomiting, diarrhea, heartburn, abdominal pain. Blood in the stools, hematemesis. Pain with swallowing, constipation    GENITOURINARY:       Urinary frequency, hesitancy,  urgency, Dysuria, hematuria,  Urinary Incontinence. Urinary Retention.   GYNECOLOGICAL: vaginal bleeding , vaginal discharge, menopause    MUSCULOSKELETAL:       joint swelling or stiffness, joint pain, muscle pain, balance problems, low back pain. NEUROLOGICAL:      Gait problems. Tremor. Dizziness. Pain and paresthesias, weakness in extremities. Seizures, memory loss    PSYCHLOGICAL:        Anxiety, depression    SKIN :      Rashes ulcers, skin color changes, easy bruisability, lymphadenopathy      Physical Exam:      Vitals: /71   Pulse (!) 128   Temp (!) 100.9 °F (38.3 °C) (Axillary)   Resp 24   Ht 5' 8\" (1.727 m)   Wt 212 lb 3.2 oz (96.3 kg)   SpO2 92%   BMI 32.26 kg/m²     Gen:          Alert and oriented x 1  Eyes: PERRL. No sclera icterus. No conjunctival injection. ENT: No discharge. Pharynx clear. External appearance of ears and nose normal.  Neck: Trachea midline. No obvious mass. Resp: No accessory muscle use. No crackles. No wheezes. No rhonchi. CV: Regular rate. Regular rhythm. No murmur or rub. No edema. GI: Non-tender. Non-distended. No hernia. Skin: Warm, dry, normal texture and turgor. Lymph: No cervical LAD. No supraclavicular LAD. M/S: / Ext. No cyanosis. No clubbing. No joint deformity. Neuro: Moves all four extremities. CN 2-12 tested, no deficits noted. Peripheral pulses and capillary refill is intact. CBC: No results for input(s): WBC, HGB, PLT in the last 72 hours. BMP:  No results for input(s): NA, K, CL, CO2, BUN, CREATININE, GLUCOSE in the last 72 hours. Hepatic: No results for input(s): AST, ALT, ALB, BILITOT, ALKPHOS in the last 72 hours. Troponin: No results for input(s): TROPONINI in the last 72 hours. BNP: No results for input(s): BNP in the last 72 hours. INR: No results for input(s): INR in the last 72 hours. Lab Results   Component Value Date/Time    LABA1C 5.9 04/29/2022 07:59 AM           No results for input(s): CKTOTAL in the last 72 hours.   -----------------------------------------------------------------    CXR  No acute cardiopulmonary abnormality          Assessment / Plan     Febrile illness  Status post

## 2022-10-04 NOTE — PROGRESS NOTES
Pt restless, combative, pulled off ostomy bag. Pt temp 101.6. Supervisor notified. New room assigned on PCU.

## 2022-10-04 NOTE — OP NOTE
Operative Note -urology      Patient: Solitario Rose  YOB: 1940  MRN: [id number]    Date of Procedure: 10/4/2022    Pre-Op Diagnosis: GROSS HEMATURIA    Post-Op Diagnosis: Same       Procedure(s):  endoscopic exploration of the ileal conduit with biopsy and fulguration. Surgeon(s):  Rosina Leblanc MD    Assistant:   * No surgical staff found *    Anesthesia: Monitor Anesthesia Care    Estimated Blood Loss (mL): Minimal    Complications: None    Specimens:   ID Type Source Tests Collected by Time Destination   A : a) ileal conduit biopsy- history of bladder cancer Tissue Tissue SURGICAL PATHOLOGY Rosina Leblanc MD 10/4/2022 1403        Implants:  * No implants in log *      Drains: * No LDAs found *    Findings: Suspicious area in the ileal conduit that was biopsied and fulgurated    Indications: -81year-old patient's status post a radical cystectomy and ileal conduit urinary diversion approximately 20 years ago -is now experiencing continued gross hematuria. Prior evaluation includes CT scans and a loopogram.      Detailed Description of Procedure:   After appropriate IV sedation the ileal conduit stoma site was prepped and draped. A guidewire was easily passed through the stoma and coiled in the proximal portion of the ileal conduit. Examination with both a rigid and flexible cystoscope located a raised papillary area proximal portion of the conduit inferiorly. Unfortunately the ureteral orifice ease could not be located. However prolonged observation of the proximal portion the conduit did not indicate any evidence of a blooding from the suspected location of the ureters. Careful manipulation of the rigid scope did make it possible to obtain a single biopsy of the papillary tissue in the conduit. Bugbee electrode at a very low setting was then used to fulgurate the biopsy site as well as the surrounding abnormal tissue on the mucosa of the conduit.     All visible tissue had been fulgurated and good hemostasis resulted. The patient's ostomy appliance was replaced he was transferred to the recovery room having tolerated the procedure well.     Electronically signed by Wendi Krueger MD on 10/4/2022 at 2:39 PM call back

## 2022-10-04 NOTE — ANESTHESIA POSTPROCEDURE EVALUATION
Department of Anesthesiology  Postprocedure Note    Patient: Lawayne Romberg  MRN: [id number]  YOB: 1940  Date of evaluation: 10/4/2022      Procedure Summary     Date: 10/04/22 Room / Location: [address] [place] [address]    Anesthesia Start: 5237 Anesthesia Stop: 1430    Procedure: endoscopic exploration of the ileal conduit with biopsy and fulguration. (Bladder) Diagnosis:       Gross hematuria      (GROSS HEMATURIA)    Surgeons: Peri Kumar MD Responsible Provider: Ovi Horta MD    Anesthesia Type: MAC ASA Status: 3          Anesthesia Type: MAC    Saqib Phase I: Saqib Score: 5    Saqib Phase II:        Anesthesia Post Evaluation    Patient location during evaluation: PACU  Patient participation: complete - patient participated  Level of consciousness: awake, confused and sleepy but conscious  Airway patency: patent  Nausea & Vomiting: no nausea and no vomiting  Complications: no  Cardiovascular status: hemodynamically stable and blood pressure returned to baseline  Respiratory status: spontaneous ventilation, nonlabored ventilation and nasal cannula  Hydration status: stable  Comments: Resting comfortably. Now requiring supplemental oxygen. Attempting to reach hospitalist service for overnight observation and additional antibiotics. Wife updated, now at bedside per her request. Room availability at this time unclear.

## 2022-10-04 NOTE — DISCHARGE INSTRUCTIONS
He is to hold his aspirin for 14 days. He will be contacted with the pathology results and outpatient follow-up will be arranged accordingly.

## 2022-10-04 NOTE — PROGRESS NOTES
Pt shivering less than earlier. /82.  02 sat 90%. Encouraged deep breathing - pt unable to follow commands due to dementia.

## 2022-10-04 NOTE — ANESTHESIA POSTPROCEDURE EVALUATION
Department of Anesthesiology  Postprocedure Note    Patient: Celina Peraza  MRN: [id number]  YOB: 1940  Date of evaluation: 10/4/2022      Procedure Summary     Date: 10/04/22 Room / Location: [address] [place] [address]    Anesthesia Start: 9281 Anesthesia Stop: 1430    Procedure: endoscopic exploration of the ileal conduit with biopsy and fulguration. (Bladder) Diagnosis:       Gross hematuria      (GROSS HEMATURIA)    Surgeons: Venus Barnes MD Responsible Provider: Stepahne Lainez MD    Anesthesia Type: MAC ASA Status: 3          Anesthesia Type: MAC    Saqib Phase I: Saqib Score: 5    Saqib Phase II:        Anesthesia Post Evaluation    Patient location during evaluation: PACU  Patient participation: complete - patient participated  Level of consciousness: awake, sleepy but conscious and confused  Airway patency: patent  Nausea & Vomiting: no nausea and no vomiting  Complications: no  Cardiovascular status: hemodynamically stable and blood pressure returned to baseline  Respiratory status: spontaneous ventilation, nonlabored ventilation and room air  Hydration status: stable  Comments: Mr. Reno Rodríguezw continuously shivering following procedures. Reports that he is very cold. Afebrile. Denies headache, nausea, or significant pain following procedure. Intermittent confusion at baseline with Alzheimer's. Continue to reorient to place and situation. Discussed with Dr. Drew Reilly. No concern for infectious process on exploration of ileal conduit. Will continue to monitor. Wife updated. Oxygen saturation 90-92% on room air, CXR ordered. No obvious sign of aspiration, but lineal opacity noted to right of tracheal border, possible mucous plugging, will follow radiology read.

## 2022-10-05 LAB
ANION GAP SERPL CALCULATED.3IONS-SCNC: 18 MMOL/L (ref 3–16)
BANDED NEUTROPHILS RELATIVE PERCENT: 27 % (ref 0–7)
BASOPHILS ABSOLUTE: 0 K/UL (ref 0–0.2)
BASOPHILS RELATIVE PERCENT: 0 %
BUN BLDV-MCNC: 20 MG/DL (ref 7–20)
CALCIUM SERPL-MCNC: 9.3 MG/DL (ref 8.3–10.6)
CHLORIDE BLD-SCNC: 101 MMOL/L (ref 99–110)
CO2: 18 MMOL/L (ref 21–32)
CREAT SERPL-MCNC: 1 MG/DL (ref 0.8–1.3)
EOSINOPHILS ABSOLUTE: 0 K/UL (ref 0–0.6)
EOSINOPHILS RELATIVE PERCENT: 0 %
GFR AFRICAN AMERICAN: >60
GFR NON-AFRICAN AMERICAN: >60
GLUCOSE BLD-MCNC: 139 MG/DL (ref 70–99)
HCT VFR BLD CALC: 36.3 % (ref 40.5–52.5)
HEMOGLOBIN: 11.8 G/DL (ref 13.5–17.5)
LACTIC ACID, SEPSIS: 4.5 MMOL/L (ref 0.4–1.9)
LACTIC ACID, SEPSIS: 5.2 MMOL/L (ref 0.4–1.9)
LACTIC ACID: 2 MMOL/L (ref 0.4–2)
LACTIC ACID: 3.2 MMOL/L (ref 0.4–2)
LYMPHOCYTES ABSOLUTE: 1 K/UL (ref 1–5.1)
LYMPHOCYTES RELATIVE PERCENT: 4 %
MCH RBC QN AUTO: 28.4 PG (ref 26–34)
MCHC RBC AUTO-ENTMCNC: 32.4 G/DL (ref 31–36)
MCV RBC AUTO: 87.6 FL (ref 80–100)
METAMYELOCYTES RELATIVE PERCENT: 1 %
MONOCYTES ABSOLUTE: 0.8 K/UL (ref 0–1.3)
MONOCYTES RELATIVE PERCENT: 3 %
NEUTROPHILS ABSOLUTE: 23.3 K/UL (ref 1.7–7.7)
NEUTROPHILS RELATIVE PERCENT: 65 %
PDW BLD-RTO: 13.3 % (ref 12.4–15.4)
PLATELET # BLD: 148 K/UL (ref 135–450)
PMV BLD AUTO: 7.8 FL (ref 5–10.5)
POTASSIUM REFLEX MAGNESIUM: 4.4 MMOL/L (ref 3.5–5.1)
PROCALCITONIN: 96.06 NG/ML (ref 0–0.15)
RBC # BLD: 4.15 M/UL (ref 4.2–5.9)
RBC # BLD: NORMAL 10*6/UL
SODIUM BLD-SCNC: 137 MMOL/L (ref 136–145)
WBC # BLD: 25 K/UL (ref 4–11)

## 2022-10-05 PROCEDURE — 97530 THERAPEUTIC ACTIVITIES: CPT

## 2022-10-05 PROCEDURE — 97162 PT EVAL MOD COMPLEX 30 MIN: CPT

## 2022-10-05 PROCEDURE — 2580000003 HC RX 258: Performed by: HOSPITALIST

## 2022-10-05 PROCEDURE — 36415 COLL VENOUS BLD VENIPUNCTURE: CPT

## 2022-10-05 PROCEDURE — 6360000002 HC RX W HCPCS: Performed by: HOSPITALIST

## 2022-10-05 PROCEDURE — 94761 N-INVAS EAR/PLS OXIMETRY MLT: CPT

## 2022-10-05 PROCEDURE — 6370000000 HC RX 637 (ALT 250 FOR IP): Performed by: HOSPITALIST

## 2022-10-05 PROCEDURE — 85025 COMPLETE CBC W/AUTO DIFF WBC: CPT

## 2022-10-05 PROCEDURE — 83605 ASSAY OF LACTIC ACID: CPT

## 2022-10-05 PROCEDURE — 1200000000 HC SEMI PRIVATE

## 2022-10-05 PROCEDURE — 97166 OT EVAL MOD COMPLEX 45 MIN: CPT

## 2022-10-05 PROCEDURE — 87086 URINE CULTURE/COLONY COUNT: CPT

## 2022-10-05 PROCEDURE — 87040 BLOOD CULTURE FOR BACTERIA: CPT

## 2022-10-05 PROCEDURE — 97535 SELF CARE MNGMENT TRAINING: CPT

## 2022-10-05 PROCEDURE — 80048 BASIC METABOLIC PNL TOTAL CA: CPT

## 2022-10-05 PROCEDURE — 84145 PROCALCITONIN (PCT): CPT

## 2022-10-05 RX ORDER — SODIUM CHLORIDE 9 MG/ML
INJECTION, SOLUTION INTRAVENOUS PRN
Status: DISCONTINUED | OUTPATIENT
Start: 2022-10-05 | End: 2022-10-05 | Stop reason: SDUPTHER

## 2022-10-05 RX ORDER — SODIUM CHLORIDE 0.9 % (FLUSH) 0.9 %
5-40 SYRINGE (ML) INJECTION EVERY 12 HOURS SCHEDULED
Status: DISCONTINUED | OUTPATIENT
Start: 2022-10-05 | End: 2022-10-05 | Stop reason: SDUPTHER

## 2022-10-05 RX ORDER — SODIUM CHLORIDE 0.9 % (FLUSH) 0.9 %
5-40 SYRINGE (ML) INJECTION PRN
Status: DISCONTINUED | OUTPATIENT
Start: 2022-10-05 | End: 2022-10-05 | Stop reason: SDUPTHER

## 2022-10-05 RX ORDER — SODIUM CHLORIDE, SODIUM LACTATE, POTASSIUM CHLORIDE, AND CALCIUM CHLORIDE .6; .31; .03; .02 G/100ML; G/100ML; G/100ML; G/100ML
30 INJECTION, SOLUTION INTRAVENOUS ONCE
Status: COMPLETED | OUTPATIENT
Start: 2022-10-05 | End: 2022-10-05

## 2022-10-05 RX ADMIN — DIVALPROEX SODIUM 125 MG: 125 CAPSULE ORAL at 09:09

## 2022-10-05 RX ADMIN — OLANZAPINE 5 MG: 5 TABLET, FILM COATED ORAL at 20:32

## 2022-10-05 RX ADMIN — Medication 10 ML: at 09:09

## 2022-10-05 RX ADMIN — CITALOPRAM HYDROBROMIDE 20 MG: 20 TABLET ORAL at 09:09

## 2022-10-05 RX ADMIN — SODIUM CHLORIDE: 9 INJECTION, SOLUTION INTRAVENOUS at 16:30

## 2022-10-05 RX ADMIN — Medication 1000 UNITS: at 09:09

## 2022-10-05 RX ADMIN — SODIUM CHLORIDE, POTASSIUM CHLORIDE, SODIUM LACTATE AND CALCIUM CHLORIDE 2052 ML: 600; 310; 30; 20 INJECTION, SOLUTION INTRAVENOUS at 11:49

## 2022-10-05 RX ADMIN — Medication 10 ML: at 20:32

## 2022-10-05 RX ADMIN — OLANZAPINE 5 MG: 5 TABLET, FILM COATED ORAL at 09:09

## 2022-10-05 RX ADMIN — SODIUM CHLORIDE: 9 INJECTION, SOLUTION INTRAVENOUS at 03:16

## 2022-10-05 RX ADMIN — ROSUVASTATIN CALCIUM 20 MG: 20 TABLET, FILM COATED ORAL at 20:32

## 2022-10-05 RX ADMIN — ASPIRIN 81 MG: 81 TABLET, COATED ORAL at 09:09

## 2022-10-05 RX ADMIN — CEFTRIAXONE 2000 MG: 2 INJECTION, POWDER, FOR SOLUTION INTRAMUSCULAR; INTRAVENOUS at 13:57

## 2022-10-05 NOTE — PROGRESS NOTES
Occupational Therapy  Facility/Department: [facility] PROGRESSIVE CARE  Occupational Therapy Initial Assessment    Name: Kendall Chester  : 1940  MRN: [id number]  Date of Service: 10/5/2022    Discharge Recommendations:  Home with Home health OT, S Level 1, Home with assist PRN  OT Equipment Recommendations  Other: wife to borrow shower chair     Kendall Chester scored a 20/24 on the AM-PAC ADL Inpatient form. Current research shows that an AM-PAC score of 18 or greater is typically associated with a discharge to the patient's home setting. Based on the patient's AM-PAC score, and their current ADL deficits, it is recommended that the patient have 2-3 sessions per week of Occupational Therapy at d/c to increase the patient's independence. At this time, this patient demonstrates the endurance and safety to discharge home with home OT services and a follow up treatment frequency of 2-3x/wk. Please see assessment section for further patient specific details. If patient discharges prior to next session this note will serve as a discharge summary. Please see below for the latest assessment towards goals. Patient Diagnosis(es): The encounter diagnosis was Gross hematuria. Past Medical History:  has a past medical history of Bladder cancer ([facility] [address] Blood in urine, CAD (coronary artery disease), Colon polyps, Dementia ([address] and Hyperlipidemia. Past Surgical History:  has a past surgical history that includes Bladder removal (); Colonoscopy (2010); other surgical history (2013, 2014); Colonoscopy (10/05/2016); Tibia fracture surgery (Right, ); and Cystoscopy (N/A, 10/4/2022). Treatment Diagnosis: impaired ADLs      Assessment   Performance deficits / Impairments: Decreased functional mobility ; Decreased ADL status; Decreased safe awareness;Decreased high-level IADLs;Decreased endurance  Assessment: pt is an 80 y/o male admitted to hospital w/ fever, chills, hematuria, is being tx'ed for sepsis. underwent endoscopic exploration of the ileal conduit with biopsy and fulguration; had post op confusion & agitation. PTA, he was fairly IND w/ all ADLs, walked to mailbox x 1/4 mile no AD, was IND w/ urostomy care & making simple meals/cereal (pt has dementia, wife & family present in the home). Currently he is functioning slightly below previous occupational performance baseline d/t post op confusion, having sepsis; given HHA for transfers in & out of recliner x 5 ft. Anticipate he would require up to min/SBA for LB ADLs & toilet tasks. Recommend continued OT services @ 2-3xs a wk intervals to advance his IND & get back on his regular routine at home; [facility] and wife to borrow shower chair to reduce fall risk & conserve energy during bathing  Treatment Diagnosis: impaired ADLs  Prognosis: Good  Decision Making: Medium Complexity  History: has dementia  REQUIRES OT FOLLOW-UP: Yes  Activity Tolerance  Activity Tolerance: Patient Tolerated treatment well  Activity Tolerance Comments: pleasantly confused, cooperative; @ 96% pulse ox on RA        Plan   Occupational Therapy Plan  Times Per Week: 2-3  Times Per Day:  Once a day  Hours Per Day: 0.5 hour  Therapy Duration: 4-7 Days  Specific Instructions for Next Treatment: sponge bathing  Current Treatment Recommendations: Functional mobility training, Safety education & training, Patient/Caregiver education & training, Self-Care / ADL, Home management training, Equipment evaluation, education, & procurement     Restrictions  Restrictions/Precautions  Restrictions/Precautions: Fall Risk  Position Activity Restriction  Other position/activity restrictions: reg diet, teley, ostomy, R UE IV    Subjective   General  Chart Reviewed: Yes  Patient assessed for rehabilitation services?: Yes  Additional Pertinent Hx: per Dr Gerber H&P note on 10/5/22:\"The patient Festus Whiteside is a 81 y.o.male with medical history significant for bladder cancer, coronary artery disease dementia  Patient presented with gross hematuria for which patient was taken to the OR by urology and patient underwent endoscopic exploration of the ileal conduit with biopsy and fulguration. In the postop area patient became more confused than his baseline and patient required 2 L nasal cannula oxygen to make oxygen saturation of 90%. Patient also spiked a fever of 101.6. Medical team is requested for patient's admission and further evaluation and treatment  At the time of examination patient is unable to provide any history. \"  Family / Caregiver Present: Yes (wife present)  Referring Practitioner: Dr Reina Hudson  Diagnosis: hematuria, sepsis  Subjective  Subjective: met in room, RN present to examine urostomy bag--dark colored blood noted  General Comment  Comments: per RN ok to evaluate, pt denies pain     Social/Functional History  Social/Functional History  Lives With: Spouse, Family (lives w/ wife and grandkids)  Type of Home: House  Home Layout: Multi-level (2 story home + basement)  Bathroom Shower/Tub: Tub/Shower unit  Bathroom Accessibility: Walker accessible  Home Equipment: Cane  Has the patient had two or more falls in the past year or any fall with injury in the past year?: No  ADL Assistance: Independent  Homemaking Assistance:  (he was able to change urostomy IND'ly & fix himself small meals--cereal)  Ambulation Assistance: Independent  Transfer Assistance: Independent  Occupation: Retired  Type of 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 \"\" 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[facility], has firemen as friends  Leisure & Hobbies: walking to [employer] mile, riding , watched baseball  IADL Comments: pt ambulatory w/o AD  Additional Comments: pt sleeps in reg bed, can borrow shower chair to conserve energy & reduce fall risk       Objective   Heart Rate: 87  Heart Rate Source: Monitor  BP: (!) 109/58  BP Location: Left upper arm  BP Method: Automatic  Patient Position: Sitting;Up in chair  MAP (Calculated): 75  Resp: 16  SpO2: 95 %  O2 Device: None (Room air)          Observation/Palpation  Posture: Good  Observation: R UE IV site, on teley; urostomy bag R lower quadrant  Safety Devices  Type of Devices: Gait belt;Call light within reach; Left in chair;Nurse notified (wife present in room)  Balance  Sitting: Intact  Standing: With support (gave light hand held A)  Transfer Training  Transfer Training: Yes  Overall Level of Assistance: Contact-guard assistance  Sit to Stand: Contact-guard assistance  Stand to Sit: Contact-guard assistance (gave hand held A in & out of recliner)  Duration: 1 (1 minute)  Gait  Overall Level of Assistance: Contact-guard assistance;Assist X1  Interventions: Safety awareness training  Distance (ft): 5 Feet        ADL  Feeding: Independent  Feeding Skilled Clinical Factors: on reg diet, encouraged to drink water  Grooming Skilled Clinical Factors: anticipate he would need set up & cues to initiate  UE Dressing: Minimal assistance  UE Dressing Skilled Clinical Factors: adjusted hosp gown and IV line out of sleeve  Toileting Skilled Clinical Factors: PTA, pt was IND w/ urostomy care; declined need to use toilet during eval  Additional Comments: anticipate he would require close SB/CGA + set up & cues for ADLs; recommended borrowing shower chair to conserve energy & reduce fall risk during bathing tasks           Transfers  Transfer Comments: given hand held A to ambulate to/from recliner x 5 ft (hooked up to IV pole & teley) declines need for RW  Vision  Vision: Within Functional Limits (wears glasses AATs)  Hearing  Hearing: Exceptions to [facility]  Hearing Exceptions: Hard of hearing/hearing concerns  Cognition  Overall Cognitive Status: Exceptions  Memory: Decreased recall of biographical Information;Decreased short term memory;Decreased recall of recent events  Safety Judgement: Decreased awareness of need for safety  Problem Solving: Assistance required to generate solutions  Initiation: Requires cues for some  Sequencing: Requires cues for some  Cognition Comment: pt is Ox self, confused to place even when provided w/ 2 choices; word finding & memory problems (has dementia)  Orientation  Overall Orientation Status: Impaired  Orientation Level: Oriented to person                     LUE AROM (degrees)  LUE AROM : WNL  Left Hand AROM (degrees)  Left Hand AROM: WNL  RUE AROM (degrees)  RUE AROM : WNL  Right Hand AROM (degrees)  Right Hand AROM: WNL                         AM-PAC Score        AM-PAC Inpatient Daily Activity Raw Score: 20 (10/05/22 1156)  AM-PAC Inpatient ADL T-Scale Score : 42.03 (10/05/22 1156)  ADL Inpatient CMS 0-100% Score: 38.32 (10/05/22 1156)  ADL Inpatient CMS G-Code Modifier : CJ (10/05/22 1156)    Goals  Short Term Goals  Time Frame for Short Term Goals: by 2-3 sessions pt will complete  Short Term Goal 1: UB and LB dressing w/ SBA, set up + cues  Short Term Goal 2: household t/f w/ distant supervision no AD  Short Term Goal 3: toilet tasks w/ SBA including urostomy care  Short Term Goal 4: address caregiver instruction & DME needs prn--wife plans to borrow shower chair  Additional Goals?: No  Long Term Goals  Time Frame for Long Term Goals : same as STGs  Patient Goals   Patient goals : pt plans to return home when medically stable; recommend home care services x 1-2 wks to ensure IND w/ routine in home environment       Therapy Time   Individual Concurrent Group Co-treatment   Time In 1100         Time Out 1145         Minutes 45         Timed Code Treatment Minutes: 600 [facility], OTR/L #[id number]

## 2022-10-05 NOTE — PLAN OF CARE
Problem: Discharge Planning  Goal: Discharge to home or other facility with appropriate resources  Outcome: Progressing  Flowsheets (Taken 10/4/2022 1901 by Brian Juarez RN)  Discharge to home or other facility with appropriate resources: Identify barriers to discharge with patient and caregiver     Problem: Pain  Goal: Verbalizes/displays adequate comfort level or baseline comfort level  Outcome: Progressing     Problem: Skin/Tissue Integrity  Goal: Absence of new skin breakdown  Description: 1. Monitor for areas of redness and/or skin breakdown  2. Assess vascular access sites hourly  3. Every 4-6 hours minimum:  Change oxygen saturation probe site  4. Every 4-6 hours:  If on nasal continuous positive airway pressure, respiratory therapy assess nares and determine need for appliance change or resting period.   Outcome: Progressing

## 2022-10-05 NOTE — PROGRESS NOTES
Physical Therapy  Facility/Department: [facility] PROGRESSIVE CARE  Physical Therapy Initial Assessment    Name: Juan Pablo Yeboah  : 1940  MRN: [id number]  Date of Service: 10/5/2022    Discharge Recommendations:  Home with Home health PT, Home with assist PRN, Patient would benefit from continued therapy after discharge   PT Equipment Recommendations  Equipment Needed: No      Patient Diagnosis(es): The encounter diagnosis was Gross hematuria. Past Medical History:  has a past medical history of Bladder cancer ([facility] [address] Blood in urine, CAD (coronary artery disease), Colon polyps, Dementia ([facility] [address] and Hyperlipidemia. Past Surgical History:  has a past surgical history that includes Bladder removal (); Colonoscopy (2010); other surgical history (2013, 2014); Colonoscopy (10/05/2016); Tibia fracture surgery (Right, ); and Cystoscopy (N/A, 10/4/2022). Assessment   Body Structures, Functions, Activity Limitations Requiring Skilled Therapeutic Intervention: Decreased safe awareness;Decreased balance;Decreased cognition  Assessment: Pt is an 81 y.o. M. admitted 10/4 for bacteremia, increased confusion s/p procedure (exploration of the ileal conduit with biopsy and fulguration). Pt presents pleasantly confused, disoriented to place, time, and situation. Wife able to provide social hx. Pt demonstrated functional LE strength, and was able to ambulate 400' unsupported with CGA-SBA. He would benefit from continued therapy in the acute setting to maximize his endurance and balance. Anticipate safe return home with assist from wife, and home PT level 1. Juan Pablo Yeboah scored a 18/24 on the AM-PAC short mobility form. Current research shows that an AM-PAC score of 18 or greater is typically associated with a discharge to the patient's home setting.  Based on the patient's AM-PAC score and their current functional mobility deficits, it is recommended that the patient have 2-3 sessions per week of Physical Therapy at d/c to increase the patient's independence. At this time, this patient demonstrates the endurance and safety to discharge home with home PT and a follow up treatment frequency of 2-3x/wk. Please see assessment section for further patient specific details. If patient discharges prior to next session this note will serve as a discharge summary. Please see below for the latest assessment towards goals. Specific Instructions for Next Treatment: Balance; practice stair climb  Therapy Prognosis: Guarded  Decision Making: Medium Complexity  History: See below  Exam: Strength; ROM; Balance; Ambulation  Clinical Presentation: Evolving  Barriers to Learning: Confusion  Requires PT Follow-Up: Yes  Activity Tolerance  Activity Tolerance: Patient tolerated evaluation without incident;Treatment limited secondary to decreased cognition     Plan   Physcial Therapy Plan  General Plan: 2-3 times per week  Specific Instructions for Next Treatment: Balance; practice stair climb  Current Treatment Recommendations: Balance training, Gait training, Functional mobility training, Stair training, Neuromuscular re-education, Transfer training, Equipment evaluation, education, & procurement, Safety education & training, Home exercise program, Patient/Caregiver education & training  Safety Devices  Type of Devices:  All fall risk precautions in place, Call light within reach, Gait belt, Left in chair  Restraints  Restraints Initially in Place: No     Restrictions  Restrictions/Precautions  Restrictions/Precautions: Fall Risk  Position Activity Restriction  Other position/activity restrictions: reg diet, teley, ostomy, R UE IV     Subjective   General  Chart Reviewed: Yes  Patient assessed for rehabilitation services?: Yes  Additional Pertinent Hx: per Dr Bethanie Garza H&P note on 10/5/22:\"The patient Faustino Qiu is a 81 y.o.male with medical history significant for bladder cancer, coronary artery disease dementia Patient presented with gross hematuria for which patient was taken to the OR by urology and patient underwent endoscopic exploration of the ileal conduit with biopsy and fulguration. In the postop area patient became more confused than his baseline and patient required 2 L nasal cannula oxygen to make oxygen saturation of 90%. Patient also spiked a fever of 101.6. Medical team is requested for patient's admission and further evaluation and treatment  At the time of examination patient is unable to provide any history. \"  Response To Previous Treatment: Not applicable  Referring Practitioner: Dr. Reina Hudson  Referral Date : 10/05/22  Diagnosis: Dementia; Bacteremia  Follows Commands: Impaired  Subjective  Subjective: Pt has dementia - is confused at baseline, though his wife reports he is more confused than normal.  He is unable to correctly answer any orientation questions.          Social/Functional History  Social/Functional History  Lives With: Spouse, Family (lives w/ wife and grandkids)  Type of Home: House  Home Layout: Multi-level (2 story home + basement)  Bathroom Shower/Tub: Tub/Shower unit  Bathroom Accessibility: Walker accessible  Home Equipment: Cane  Has the patient had two or more falls in the past year or any fall with injury in the past year?: No  ADL Assistance: Independent  Homemaking Assistance:  (he was able to change urostomy IND'ly & fix himself small meals--cereal)  Ambulation Assistance: Independent  Transfer Assistance: Independent  Occupation: Retired  Type of 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 \"\" 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[facility], has firemen as friends  Leisure & Hobbies: walking to [employer] mile, riding , watched baseball  IADL Comments: pt ambulatory w/o AD  Additional Comments: pt sleeps in reg bed, can borrow shower chair to conserve energy & reduce fall risk    Objective     AROM RLE (degrees)  RLE AROM: WFL  AROM LLE (degrees)  LLE AROM : WFL  AROM RUE (degrees)  RUE AROM : WFL  AROM LUE (degrees)  LUE AROM : WFL    Strength RLE  Strength RLE: WFL  Strength LLE  Strength LLE: WFL  Strength RUE  Strength RUE: WFL  Strength LUE  Strength LUE: WFL     Transfers  Sit to Stand: Stand by assistance  Stand to Sit: Stand by assistance    Ambulation  Surface: Level tile  Device: No Device  Assistance: Stand by assistance  Quality of Gait: Fast pace, moderate trunk sway leading to several mild LOB but able to self-correct without assist.  Pt able to maintain stable SPO2 on room air. Distance: 30', 400'      AM-PAC Score  AM-PAC Inpatient Mobility Raw Score : 18 (10/05/22 1336)  AM-PAC Inpatient T-Scale Score : 43.63 (10/05/22 1336)  Mobility Inpatient CMS 0-100% Score: 46.58 (10/05/22 1336)  Mobility Inpatient CMS G-Code Modifier : CK (10/05/22 1336)      Goals  Short Term Goals  Time Frame for Short Term Goals: 2-3 days  Short Term Goal 1: Bed mobility (I)  Short Term Goal 2: Transfers (I)  Short Term Goal 3: Ambulation 500' (I)  Short Term Goal 4: Ascend/Descend 12 steps (I)  Patient Goals   Patient Goals : Unable to state       Education  Patient Education  Education Given To: Patient; Family  Education Provided: Role of Therapy;Plan of Care; Fall Prevention Strategies; Energy Conservation;Transfer Training  Education Method: Demonstration;Verbal  Education Outcome: Continued education needed      Therapy Time   Individual Concurrent Group Co-treatment   Time In 0626         Time Out 1330         Minutes 23         Timed Code Treatment Minutes: 8 Minutes   Medium Complexity Evaluation    Ashly Hernandez PT   Electronically signed by Ashly Hernandez PT [id number] on 10/5/2022 at 1:43 PM

## 2022-10-05 NOTE — PROGRESS NOTES
Hospitalist Progress Note    Patient:  Yuri Dunne  Unit/Bed:J3G-3165/5254-01   YOB: 1940       MRN: [id number] Acct: [de-identified]  PCP: Diana Roque MD    Date of Admission: 10/4/2022  --------------------------    Chief Complaint:   Postop fever    Hospital Course:     Yuri Dunne is a 81 y.o. male hospitalized on 10/4/2022   for postop fever after cystoscopy 10/4/2022 done for hematuria. Assessment/plan:     Urinary tract infection with severe sepsis evident by leukocytosis, elevated procalcitonin, elevated lactic acid    -Continue IV ceftriaxone  -Obtain blood culture  -Resuscitation IV fluid, repeat lactic acid  -Follow urine culture ? Accuracy in the light of the    ? Hypoxia  Chest x-ray without acute finding  Currently off oxygen    Hematuria in the setting of ileal pouch status post cystoscopy, fulguration, biopsy 10/4/2022    -Management per urology      Dementia with behavioral disturbances  Continue Zyprexa, Depakote    Dyslipidemia, continue aspirin statin    Code Status: Full Code         DVT prophylaxis: Lovenox     Disposition: Continue IV antibiotics, follow culture data, likely need 2 to 3 days    Patient has poor insight no family at bedside    Discussed with RN      ----------------      Subjective:     Patient seen and examined  Overnight events noted  RN and ancillary staff note reviewed    Alert, pleasant, poor insight  Has no complaint      Diet: ADULT DIET; Regular    OBJECTIVE     Exam:  BP (!) 109/58   Pulse 87   Temp 98 °F (36.7 °C) (Oral)   Resp 16   Ht 5' 8\" (1.727 m)   Wt 211 lb 3.2 oz (95.8 kg)   SpO2 95%   BMI 32.11 kg/m²            Gen: Not in distress. Alert. But confused, pleasant, afebrile  Head: Normocephalic. Atraumatic. Eyes: Conjunctivae/corneas clear. ENT: Oral mucosa moist  Neck: No JVD. No obvious thyromegaly. CVS: Nml S1S2, no murmur  , RRR  Pulmomary: Clear bilaterally. No crackles.  No wheezes. Gastrointestinal: Soft, non tender, non distend, . Urostomy site noted  Musculoskeletal: No edema. Warm  Neuro: No focal deficit. Moves extremity spontaneously. Psychiatry: Cooperative, poor insight        Medications:  Reviewed    Infusion Medications    sodium chloride 100 mL/hr at 10/05/22 0316    sodium chloride       Scheduled Medications    citalopram  20 mg Oral Daily    divalproex  125 mg Oral Daily    OLANZapine  5 mg Oral BID    rosuvastatin  20 mg Oral Nightly    Vitamin D  1,000 Units Oral Daily    aspirin  81 mg Oral Daily    sodium chloride flush  5-40 mL IntraVENous 2 times per day    cefTRIAXone (ROCEPHIN) IV  2,000 mg IntraVENous Q24H     PRN Meds: sodium chloride flush, sodium chloride, potassium chloride **OR** potassium alternative oral replacement **OR** potassium chloride, ondansetron **OR** ondansetron, polyethylene glycol, acetaminophen **OR** acetaminophen      Intake/Output Summary (Last 24 hours) at 10/5/2022 1254  Last data filed at 10/5/2022 0941  Gross per 24 hour   Intake 1000 ml   Output 1175 ml   Net -175 ml             Labs:   Recent Labs     10/05/22  1018   WBC 25.0*   HGB 11.8*   HCT 36.3*        Recent Labs     10/04/22  1832 10/05/22  0524    137   K 4.1 4.4    101   CO2 21 18*   BUN 18 20   CREATININE 1.0 1.0   CALCIUM 9.2 9.3     No results for input(s): AST, ALT, BILIDIR, BILITOT, ALKPHOS in the last 72 hours. No results for input(s): INR in the last 72 hours. No results for input(s): Kinza Zoë in the last 72 hours.     Urinalysis:      Lab Results   Component Value Date/Time    NITRU Negative 04/16/2020 05:21 PM    WBCUA 19 04/16/2020 05:40 PM    BACTERIA 3+ 04/14/2020 01:02 PM    RBCUA 8 04/16/2020 05:40 PM    BLOODU SMALL 04/16/2020 05:21 PM    SPECGRAV 1.019 04/16/2020 05:21 PM    GLUCOSEU Negative 04/16/2020 05:21 PM    GLUCOSEU NEGATIVE 12/20/2010 10:10 PM       Radiology:  XR CHEST PORTABLE   Final Result   No acute cardiopulmonary findings.                      Electronically signed by Alka Patel MD on 10/5/2022 at 12:54 PM

## 2022-10-05 NOTE — PLAN OF CARE
Problem: Discharge Planning  Goal: Discharge to home or other facility with appropriate resources  10/5/2022 0948 by Paxton Grimaldo RN  Outcome: Progressing     Problem: Pain  Goal: Verbalizes/displays adequate comfort level or baseline comfort level  10/5/2022 0948 by Paxton Grimaldo RN  Outcome: Progressing     Problem: Skin/Tissue Integrity  Goal: Absence of new skin breakdown  Description: 1. Monitor for areas of redness and/or skin breakdown  2. Assess vascular access sites hourly  3. Every 4-6 hours minimum:  Change oxygen saturation probe site  4. Every 4-6 hours:  If on nasal continuous positive airway pressure, respiratory therapy assess nares and determine need for appliance change or resting period.   10/5/2022 0948 by Paxton Grimaldo RN  Outcome: Progressing     Problem: ABCDS Injury Assessment  Goal: Absence of physical injury  Outcome: Progressing     Problem: Safety - Adult  Goal: Free from fall injury  Outcome: Progressing

## 2022-10-05 NOTE — PROGRESS NOTES
4 Eyes Skin Assessment     The patient is being assess for  Admission    I agree that 2 RN's have performed a thorough Head to Toe Skin Assessment on the patient. ALL assessment sites listed below have been assessed. Areas assessed by both nurses:   [x]   Head, Face, and Ears   [x]   Shoulders, Back, and Chest  [x]   Arms, Elbows, and Hands   [x]   Coccyx, Sacrum, and IschIum  [x]   Legs, Feet, and Heels        Does the Patient have Skin Breakdown?   No         Lambert Prevention initiated:  Yes   Wound Care Orders initiated:  NA      [facility] nurse consulted for Pressure Injury (Stage 3,4, Unstageable, DTI, NWPT, and Complex wounds), New and Established Ostomies:  Yes      Nurse 1 eSignature: Electronically signed by Margaret Balderrama RN on 10/5/22 at 12:30 AM EDT    **SHARE this note so that the co-signing nurse is able to place an eSignature**    Nurse 2 eSignature: Electronically signed by Blaise Palomo RN on 10/5/22 at 12:34 AM EDT

## 2022-10-05 NOTE — PROGRESS NOTES
Physician Progress Note      PATIENT:               Kendall Hathaway  CSN #:                  [id number]  :                       1940  ADMIT DATE:       10/4/2022 10:56 AM  DISCH DATE:  RESPONDING  PROVIDER #:        Demetri Olsen MD          QUERY TEXT:    Dear Dr. Eleni Head,  Pt admitted with Febrile illness s/p Cystoscopy and found to have Sepsis. Pt   noted to have dementia and in H and P \"patient became more confused than his   baseline\". If possible, please document in the progress notes and discharge   summary if you are evaluating and / or treating any of the following: The medical record reflects the following:  Risk Factors: Hx dementia, bladder cancer, CAD, s/p OR by urology and patient   underwent endoscopic exploration of the ileal conduit with biopsy and   fulguration, Current fever, sepsis, hypoxia  Clinical Indicators:10/4  pre-op Anesthesia note \"Very pleasant,   intermittently confused\", 10/4 post-op RN notes \"Pt restless, combative,   pulled off ostomy bag. Pt temp 101.6\", H and p notes \"mental status change\"   and \"In the postop area patient became more confused than his baseline and   patient required 2 L nasal cannula oxygen to make oxygen saturation of 90%. \",   10/5 PCP notes \"Urinary tract infection with severe sepsis evident by   leukocytosis, elevated procalcitonin, elevated lactic acid\"  and \"Dementia   with behavioral disturbances\", 10/5 RN notes \"alert only to self\"  Treatment: O2 2L, IVF, Rocephin, cultures, labs  Thank you,  Katy Sanchez RN, CDS  [email or web address]  Options provided:  -- Metabolic encephalopathy  -- Septic encephalopathy  -- Drug-induced encephalopathy due to anesthesia  -- Other - I will add my own diagnosis  -- Disagree - Not applicable / Not valid  -- Disagree - Clinically unable to determine / Unknown  -- Refer to Clinical Documentation Reviewer    PROVIDER RESPONSE TEXT:    Provider disagreed with this query.   patient has dementia    Query created by: [address], Mikel Nyhan on 10/5/2022 3:21 PM      Electronically signed by:   Ashutosh Stark MD 10/5/2022 3:25 PM

## 2022-10-05 NOTE — PROGRESS NOTES
Patient is doing well today, alert only to self. Ileostomy changed - working well, large bloody output today, no clots. Patient has been sitting up in chair, working on IS in free time. Family and patient updated on POC and agreeable. Will continue to monitor.      Electronically signed by Oleksandr Rubio RN on 10/5/2022 at 2:09 PM

## 2022-10-05 NOTE — PROGRESS NOTES
Physician Progress Note      PATIENT:               Kanu Cyr  CSN #:                  [id number]  :                       1940  ADMIT DATE:       10/4/2022 10:56 AM  DISCH DATE:  RESPONDING  PROVIDER #:        Grace Alanis MD          QUERY TEXT:    Dear Dr. Reina Hudson,  Pt admitted with Febrile illness and found to have Sepsis and UTI and   underwent endoscopic exploration of the ileal conduit with biopsy and   fulguration on 10/4 for gross hematuria. ? If possible, please document in   progress notes and discharge summary the relationship if any between the ***   and the surgery: The medical record reflects the following:  Risk Factors: Hx remote bladder cancer with  radical cystectomy and ileal   conduit urinary diversion approximately 20 years ago, presented with gross   hematuria and had endoscopic exploration of the ileal conduit with biopsy and   fulguration 10/4  Clinical Indicators: 10/4 Post-op became restless, combative (increased   confusion from baseline dementia) Fever=101.6 and hypoxia requiring O2 2L to   keep O2 sat 90% and admitted for febrile illness s/p cystoscopy. 10/5 PCP   notes \"Urinary tract infection with severe sepsis evident by leukocytosis,   elevated procalcitonin, elevated lactic acid\" WBC=25, Neutrophils=23.3,    Procal=96.06, Lactic acid=4.5  Treatment: Rocephin, IVF, Cultures, labs  Thank you,  Perla Lucio RN, [email or web address]  Options provided:  -- Sepsis and UTI ?is due to the procedure  -- Sepsis and UTI is not due to the procedure, but is due to other incidental   risk factor, Please specify other incidental risk factor. -- Other - I will add my own diagnosis  -- Disagree - Not applicable / Not valid  -- Disagree - Clinically unable to determine / Unknown  -- Refer to Clinical Documentation Reviewer    PROVIDER RESPONSE TEXT:    Patient has sepsis and UTI due to the procedure. Query created by:  Edith Matthews on 10/5/2022 3:37 PM      Electronically signed by:   Tasha Gong MD 10/5/2022 6:02 PM

## 2022-10-05 NOTE — CARE COORDINATION
Wound care consulted for \"ileal conduit. \" Pt has pre-existing urostomy to RLQ. Urine in pouch is aarti, brown colored. Bedside RN changed pouch prior to assessment. Stoma is red and moist. With peristomal skin intact. Currently in 2-piece pouch. For next pouch change please order:  [employer] [place] Flex flat #[id number] (3/8-1-7/8\" RED)  [employer] [place] Flex Urostomy Pouch #[id number] RED  Brava protective Seal thin [id number]  Routine ostomy care -   Change pouch every 3 days & PRN  Can connect to drainage bag at night with adapter. Will not continue to follow. Please re-consult if needed.   Electronically signed by Chong Lugo RN [id number] on 10/5/2022 at 12:48 PM

## 2022-10-05 NOTE — PROGRESS NOTES
Pt Name: Lawayne Romberg  Medical Record Number: [id number]  Date of Birth 1940   Today's Date: 10/5/2022      Subjective:  Patient awake and pulling gown off, very confused. ROS: Constitutional: No fever    Vitals:  Vitals:    10/05/22 0909 10/05/22 1153 10/05/22 1334 10/05/22 1536   BP: 104/62 (!) 109/58  118/71   Pulse: 85 87  84   Resp: 18 16  17   Temp: 98 °F (36.7 °C)   98 °F (36.7 °C)   TempSrc: Oral Oral  Oral   SpO2: 97% 95% 96% 94%   Weight:       Height:         I/O last 3 completed shifts: In: 1000 [I.V.:1000]  Out: 575 [Urine:575]    Exam:  General: Awake, oriented, no acute distress  Respiratory: Nonlabored breathing  Abdomen: Soft, non-tender, non-distended, no masses  : ileal conduit  Skin: Skin color, texture, turgor normal, no rashes or lesions  Neurologic: no gross deficits    CURRENT MEDICATIONS   Scheduled Meds:   citalopram  20 mg Oral Daily    divalproex  125 mg Oral Daily    OLANZapine  5 mg Oral BID    rosuvastatin  20 mg Oral Nightly    Vitamin D  1,000 Units Oral Daily    aspirin  81 mg Oral Daily    sodium chloride flush  5-40 mL IntraVENous 2 times per day    cefTRIAXone (ROCEPHIN) IV  2,000 mg IntraVENous Q24H     Continuous Infusions:   sodium chloride 100 mL/hr at 10/05/22 1630    sodium chloride       PRN Meds:.sodium chloride flush, sodium chloride, potassium chloride **OR** potassium alternative oral replacement **OR** potassium chloride, ondansetron **OR** ondansetron, polyethylene glycol, acetaminophen **OR** acetaminophen    LABS     Recent Labs     10/04/22  1832 10/05/22  0524 10/05/22  1018   WBC  --   --  25.0*   HGB  --   --  11.8*   HCT  --   --  36.3*   PLT  --   --  148    137  --    K 4.1 4.4  --     101  --    CO2 21 18*  --    BUN 18 20  --    CREATININE 1.0 1.0  --    CALCIUM 9.2 9.3  --        ASSESSMENT   Hospital day # 1  82y. o. male s/p endoscopic exploration of ileal conduit with biopsy and fulguration with Dr. Ketan Rahman on 10/4/22  Post operatively became hypoxic - admitted to medicine  Pathology pending  PLAN    Continue care per medicine  Dr. Drew Reilly will call with pathology results.     Heri Sanchez, BRITANY - CNP 10/5/2022

## 2022-10-06 LAB
ALBUMIN SERPL-MCNC: 3 G/DL (ref 3.4–5)
ANION GAP SERPL CALCULATED.3IONS-SCNC: 12 MMOL/L (ref 3–16)
BUN BLDV-MCNC: 19 MG/DL (ref 7–20)
CALCIUM SERPL-MCNC: 9.6 MG/DL (ref 8.3–10.6)
CHLORIDE BLD-SCNC: 108 MMOL/L (ref 99–110)
CO2: 21 MMOL/L (ref 21–32)
CREAT SERPL-MCNC: 0.8 MG/DL (ref 0.8–1.3)
GFR AFRICAN AMERICAN: >60
GFR NON-AFRICAN AMERICAN: >60
GLUCOSE BLD-MCNC: 105 MG/DL (ref 70–99)
HCT VFR BLD CALC: 31.9 % (ref 40.5–52.5)
HEMOGLOBIN: 10.3 G/DL (ref 13.5–17.5)
MCH RBC QN AUTO: 28.2 PG (ref 26–34)
MCHC RBC AUTO-ENTMCNC: 32.4 G/DL (ref 31–36)
MCV RBC AUTO: 87.1 FL (ref 80–100)
PDW BLD-RTO: 13 % (ref 12.4–15.4)
PHOSPHORUS: 1.3 MG/DL (ref 2.5–4.9)
PLATELET # BLD: 130 K/UL (ref 135–450)
PMV BLD AUTO: 8.7 FL (ref 5–10.5)
POTASSIUM SERPL-SCNC: 4 MMOL/L (ref 3.5–5.1)
PROCALCITONIN: 49.22 NG/ML (ref 0–0.15)
RBC # BLD: 3.67 M/UL (ref 4.2–5.9)
SODIUM BLD-SCNC: 141 MMOL/L (ref 136–145)
URINE CULTURE, ROUTINE: NORMAL
WBC # BLD: 19.2 K/UL (ref 4–11)

## 2022-10-06 PROCEDURE — 36415 COLL VENOUS BLD VENIPUNCTURE: CPT

## 2022-10-06 PROCEDURE — 80069 RENAL FUNCTION PANEL: CPT

## 2022-10-06 PROCEDURE — 6370000000 HC RX 637 (ALT 250 FOR IP): Performed by: HOSPITALIST

## 2022-10-06 PROCEDURE — 6360000002 HC RX W HCPCS: Performed by: HOSPITALIST

## 2022-10-06 PROCEDURE — 2580000003 HC RX 258: Performed by: HOSPITALIST

## 2022-10-06 PROCEDURE — 94760 N-INVAS EAR/PLS OXIMETRY 1: CPT

## 2022-10-06 PROCEDURE — 97530 THERAPEUTIC ACTIVITIES: CPT

## 2022-10-06 PROCEDURE — 85027 COMPLETE CBC AUTOMATED: CPT

## 2022-10-06 PROCEDURE — 84145 PROCALCITONIN (PCT): CPT

## 2022-10-06 PROCEDURE — 1200000000 HC SEMI PRIVATE

## 2022-10-06 RX ORDER — QUETIAPINE FUMARATE 25 MG/1
25 TABLET, FILM COATED ORAL DAILY
Status: DISCONTINUED | OUTPATIENT
Start: 2022-10-06 | End: 2022-10-07 | Stop reason: HOSPADM

## 2022-10-06 RX ADMIN — QUETIAPINE FUMARATE 25 MG: 25 TABLET ORAL at 19:43

## 2022-10-06 RX ADMIN — Medication 10 ML: at 20:43

## 2022-10-06 RX ADMIN — Medication 1000 UNITS: at 09:17

## 2022-10-06 RX ADMIN — CITALOPRAM HYDROBROMIDE 20 MG: 20 TABLET ORAL at 09:16

## 2022-10-06 RX ADMIN — OLANZAPINE 5 MG: 5 TABLET, FILM COATED ORAL at 20:43

## 2022-10-06 RX ADMIN — ROSUVASTATIN CALCIUM 20 MG: 20 TABLET, FILM COATED ORAL at 20:43

## 2022-10-06 RX ADMIN — SODIUM CHLORIDE: 9 INJECTION, SOLUTION INTRAVENOUS at 15:19

## 2022-10-06 RX ADMIN — DIVALPROEX SODIUM 125 MG: 125 CAPSULE ORAL at 09:16

## 2022-10-06 RX ADMIN — OLANZAPINE 5 MG: 5 TABLET, FILM COATED ORAL at 09:17

## 2022-10-06 RX ADMIN — SODIUM CHLORIDE: 9 INJECTION, SOLUTION INTRAVENOUS at 03:30

## 2022-10-06 RX ADMIN — ASPIRIN 81 MG: 81 TABLET, COATED ORAL at 09:17

## 2022-10-06 RX ADMIN — CEFTRIAXONE 2000 MG: 2 INJECTION, POWDER, FOR SOLUTION INTRAMUSCULAR; INTRAVENOUS at 13:04

## 2022-10-06 ASSESSMENT — PAIN SCALES - GENERAL: PAINLEVEL_OUTOF10: 0

## 2022-10-06 NOTE — PROGRESS NOTES
Occupational Therapy  Facility/Department: [address] PROGRESSIVE CARE  Occupational Therapy Daily Treatment Note    Name: Aggie Rollins  : 1940  MRN: [id number]  Date of Service: 10/6/2022    Discharge Recommendations:  Home with Home health OT, S Level 1, Home with assist PRN  OT Equipment Recommendations  Other: wife to borrow shower chair       Patient Diagnosis(es): The encounter diagnosis was Gross hematuria. Past Medical History:  has a past medical history of Bladder cancer ([facility] [address] Blood in urine, CAD (coronary artery disease), Colon polyps, Dementia ([facility] [address] and Hyperlipidemia. Past Surgical History:  has a past surgical history that includes Bladder removal (); Colonoscopy (2010); other surgical history (2013, 2014); Colonoscopy (10/05/2016); Tibia fracture surgery (Right, ); and Cystoscopy (N/A, 10/4/2022). Treatment Diagnosis: impaired ADLs      Assessment   Performance deficits / Impairments: Decreased functional mobility ; Decreased ADL status; Decreased safe awareness;Decreased high-level IADLs;Decreased endurance  Assessment: pt is an 82 y/o male admitted to hospital w/ fever, chills, hematuria, is being tx'ed for sepsis. underwent endoscopic exploration of the ileal conduit with biopsy and fulguration; had post op confusion & agitation. PTA, he was fairly IND w/ all ADLs, walked to mailbox x 1/4 mile no AD, was IND w/ urostomy care & making simple meals/cereal (pt has dementia, wife & family present in the home). Currently he is functioning slightly below previous occupational performance baseline d/t post op confusion, having sepsis. Pt completed mobility around the room without an AD and CGA/SBA. He completed transfers with CGA/SBA. He followed commands this date with repetition.   Recommend continued OT services @ 2-3xs a wk intervals to advance his IND & get back on his regular routine at home; recommend [place] OT and wife to borrow shower chair to reduce fall risk & conserve energy during bathing. Treatment Diagnosis: impaired ADLs  Prognosis: Good  History: has dementia  Activity Tolerance  Activity Tolerance: Patient Tolerated treatment well;Treatment limited secondary to decreased cognition        Plan   Occupational Therapy Plan  Times Per Week: 2-3  Times Per Day: Once a day  Hours Per Day: 0.5 hour  Therapy Duration: 4-7 Days  Specific Instructions for Next Treatment: sponge bathing  Current Treatment Recommendations: Functional mobility training, Safety education & training, Patient/Caregiver education & training, Self-Care / ADL, Home management training, Equipment evaluation, education, & procurement     Restrictions  Restrictions/Precautions  Restrictions/Precautions: Fall Risk  Position Activity Restriction  Other position/activity restrictions: reg diet, teley, ostomy, R UE IV    Subjective   General  Chart Reviewed: Yes, Orders  Patient assessed for rehabilitation services?: Yes  Additional Pertinent Hx: per Dr Arabella Melgar H&P note on 10/5/22:\"The patient Analilia Mancini is a 81 y.o.male with medical history significant for bladder cancer, coronary artery disease dementia  Patient presented with gross hematuria for which patient was taken to the OR by urology and patient underwent endoscopic exploration of the ileal conduit with biopsy and fulguration. In the postop area patient became more confused than his baseline and patient required 2 L nasal cannula oxygen to make oxygen saturation of 90%. Patient also spiked a fever of 101.6. Medical team is requested for patient's admission and further evaluation and treatment  At the time of examination patient is unable to provide any history. \"  Family / Caregiver Present: Yes (wife present at the end of the session.)  Referring Practitioner: Dr Jenn Nascimento  Diagnosis: hematuria, sepsis  Subjective  Subjective: Pt seen bedside and agreed to OT treatment. Pt pleasantly confused.   General Comment  Comments: Pt has been restless this AM attempting to get OOB multiple times. Nursing reports ok to complete therapy and may help him settle. Social/Functional History  Social/Functional History  Lives With: Spouse, Family (lives w/ wife and grandkids)  Type of Home: House  Home Layout: Multi-level (2 story home + basement)  Bathroom Shower/Tub: Tub/Shower unit  Bathroom Accessibility: Walker accessible  Home Equipment: Cane  Has the patient had two or more falls in the past year or any fall with injury in the past year?: No  ADL Assistance: Independent  Homemaking Assistance:  (he was able to change urostomy IND'ly & fix himself small meals--cereal)  Ambulation Assistance: Independent  Transfer Assistance: Independent  Occupation: Retired  Type of 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 \"\" 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[employer], has firemen as friends  Leisure & Hobbies: walking to [employer], riding , watched baseball  IADL Comments: pt ambulatory w/o AD  Additional Comments: pt sleeps in reg bed, can borrow shower chair to conserve energy & reduce fall risk       Objective   Heart Rate: 86  Heart Rate Source: Monitor  BP: 139/72  BP Location: Left upper arm  BP Method: Automatic  Patient Position: Semi fowlers  MAP (Calculated): 94.33  Resp: 17  SpO2: 92 %  O2 Device: None (Room air)             Safety Devices  Type of Devices: Call light within reach; Bed alarm in place; Left in bed;Telesitter in use;Gait belt;Nurse notified  Balance  Sitting: Intact  Standing: With support (Pt stood for 5 minutes looking out the window and engaged in conversation.)  Transfer Training  Sit to Stand: Contact-guard assistance;Stand-by assistance  Stand to Sit: Contact-guard assistance;Stand-by assistance  Gait  Overall Level of Assistance: Contact-guard assistance;Stand-by assistance  Assistive Device: Other (comment) (No AD.   Completed ambulation around the room multiple times.)  Wheelchair Bed Transfers  Wheelchair/Bed - Technique: Ambulating  Equipment Used: Other;Bed (recliner, couch)  Level of Asssistance: Contact guard assistance;Stand by assistance  Wheelchair Transfers Comments: No AD     ADL  Additional Comments: Pt declined need to use the toilet. Bed mobility  Supine to Sit: Stand by assistance  Sit to Supine: Stand by assistance  Bed Mobility Comments: Cues for positioning in the bed. Cognition  Attention Span: Attends with cues to redirect  Memory: Decreased recall of biographical Information;Decreased short term memory;Decreased recall of recent events  Safety Judgement: Decreased awareness of need for safety;Decreased awareness of need for assistance  Problem Solving: Assistance required to generate solutions  Initiation: Requires cues for some  Sequencing: Requires cues for some  Cognition Comment: Pt very restless and attempting to get OOB all morning with alarm sounding and also telesitter. Orientation  Orientation Level: Oriented to person;Disoriented to place; Disoriented to situation;Disoriented to time                                           AM-PAC Score        AM-[facility] Inpatient Daily Activity Raw Score: 20 (10/06/22 [facility])  AM-PAC Inpatient ADL T-Scale Score : 42.03 (10/06/22 1055)  ADL Inpatient CMS 0-100% Score: 38.32 (10/06/22 1055)  ADL Inpatient CMS G-Code Modifier : CJ (10/06/22 1055)        Goals  Short Term Goals  Time Frame for Short Term Goals: by 2-3 sessions pt will complete  Short Term Goal 1: UB and LB dressing w/ SBA, set up + cues  Short Term Goal 2: household t/f w/ distant supervision no AD  Short Term Goal 3: toilet tasks w/ SBA including urostomy care  Short Term Goal 4: address caregiver instruction & DME needs prn--wife plans to borrow shower chair  Additional Goals?: No  Long Term Goals  Time Frame for Long Term Goals : same as STGs  Patient Goals   Patient goals : pt plans to return home when medically stable; recommend home care services x 1-2 wks to ensure IND w/ routine in home environment       Therapy Time   Individual Concurrent Group Co-treatment   Time In 8197         Time Out 1055         Minutes 40             This note to serve as OT d/c summary if pt is d/c-ed from hospital prior to next OT session.       Houston Nolasco, [address]

## 2022-10-06 NOTE — PROGRESS NOTES
Has remained restless in bed through evening. Has attempted to leave several times. Reorientation works at times. Is still confused to place, time, and situation. Tele camera remains in use for safety. Gypsy Ugarte RN

## 2022-10-06 NOTE — PROGRESS NOTES
Hospitalist Progress Note    Patient:  Seth Abrams  Unit/Bed:N0W-2695/5254-01   YOB: 1940       MRN: [id number] Acct: [de-identified]  PCP: Zack Villa MD    Date of Admission: 10/4/2022  --------------------------    Chief Complaint:   Postop fever    Hospital Course:     Seth Abrams is a 81 y.o. male hospitalized on 10/4/2022   for postop fever after cystoscopy 10/4/2022 done for hematuria. Assessment/plan:     Urinary tract infection with severe sepsis evident by leukocytosis, elevated procalcitonin, elevated lactic acid    -Patient responding to IV ceftriaxone, defervesced. WBC and lactic acid improving  -Blood culture obtained after antibiotics given so it may come back negative. We will treat as bacteremia for 10 to 14 days      ? Hypoxia  Chest x-ray without acute finding  Currently off oxygen    Hematuria in the setting of ileal pouch status post cystoscopy, fulguration, biopsy 10/4/2022, biopsy was negative    -Management per urology      Dementia with behavioral disturbances  Continue Zyprexa, Depakote    Dyslipidemia, continue aspirin statin    Code Status: Full Code         DVT prophylaxis: Lovenox     Disposition: Hopefully discharge home in 1 to 2 days pending final culture data    Discussed with wife at bedside    Discussed with RN      ----------------      Subjective:     Patient seen and examined  Overnight events noted  RN and ancillary staff note reviewed    Has no complaint today, no major overnight event, defervesced. Diet: ADULT DIET; Regular    OBJECTIVE     Exam:  /70   Pulse 76   Temp 97.8 °F (36.6 °C) (Oral)   Resp 17   Ht 5' 8\" (1.727 m)   Wt 216 lb 14.9 oz (98.4 kg)   SpO2 91%   BMI 32.98 kg/m²          No changes in physical exam finding as documented below  Gen: Not in distress. Alert. But confused, pleasant, afebrile  Head: Normocephalic. Atraumatic. Eyes: Conjunctivae/corneas clear.   ENT: Oral mucosa moist  Neck: No JVD. No obvious thyromegaly. CVS: Nml S1S2, no murmur  , RRR  Pulmomary: Clear bilaterally. No crackles. No wheezes. Gastrointestinal: Soft, non tender, non distend, . Urostomy site noted, last hematuria noted  Musculoskeletal: No edema. Warm  Neuro: No focal deficit. Moves extremity spontaneously. Psychiatry: Cooperative, poor insight        Medications:  Reviewed    Infusion Medications    sodium chloride 100 mL/hr at 10/06/22 0930    sodium chloride       Scheduled Medications    citalopram  20 mg Oral Daily    divalproex  125 mg Oral Daily    OLANZapine  5 mg Oral BID    rosuvastatin  20 mg Oral Nightly    Vitamin D  1,000 Units Oral Daily    aspirin  81 mg Oral Daily    sodium chloride flush  5-40 mL IntraVENous 2 times per day    cefTRIAXone (ROCEPHIN) IV  2,000 mg IntraVENous Q24H     PRN Meds: sodium chloride flush, sodium chloride, potassium chloride **OR** potassium alternative oral replacement **OR** potassium chloride, ondansetron **OR** ondansetron, polyethylene glycol, acetaminophen **OR** acetaminophen      Intake/Output Summary (Last 24 hours) at 10/6/2022 1335  Last data filed at 10/6/2022 1218  Gross per 24 hour   Intake 1360 ml   Output 2650 ml   Net -1290 ml               Labs:   Recent Labs     10/05/22  1018 10/06/22  0626   WBC 25.0* 19.2*   HGB 11.8* 10.3*   HCT 36.3* 31.9*    130*       Recent Labs     10/04/22  1832 10/05/22  0524 10/06/22  0626    137 141   K 4.1 4.4 4.0    101 108   CO2 21 18* 21   BUN 18 20 19   CREATININE 1.0 1.0 0.8   CALCIUM 9.2 9.3 9.6   PHOS  --   --  1.3*       No results for input(s): AST, ALT, BILIDIR, BILITOT, ALKPHOS in the last 72 hours. No results for input(s): INR in the last 72 hours. No results for input(s): Marti Luong in the last 72 hours.     Urinalysis:      Lab Results   Component Value Date/Time    NITRU Negative 04/16/2020 05:21 PM    WBCUA 19 04/16/2020 05:40 PM    BACTERIA 3+ 04/14/2020 01:02 PM    RBCUA 8 04/16/2020 05:40 PM    BLOODU SMALL 04/16/2020 05:21 PM    SPECGRAV 1.019 04/16/2020 05:21 PM    GLUCOSEU Negative 04/16/2020 05:21 PM    GLUCOSEU NEGATIVE 12/20/2010 10:10 PM       Radiology:  XR CHEST PORTABLE   Final Result   No acute cardiopulmonary findings.                      Electronically signed by Tawanda Willson MD on 10/6/2022 at 1:35 PM

## 2022-10-06 NOTE — PROGRESS NOTES
Patient oriented to self only during day. Increasing restlessness and agitation during evening. Patient pulling lines, pulled out IV twice, pulling off tele, constantly getting out of bed and asking if he can go home and go outside for a walk. States he \"cannot be here anymore. \"    Patient reoriented regularly. Tele sitter redirecting patient frequently. Dr. Amirah Che notified of increasing restlessness and anxiety. Order for seroquel. Continue to monitor. Sitter present during night shift.

## 2022-10-06 NOTE — CARE COORDINATION
INITIAL CASE MANAGEMENT ASSESSMENT     Reviewed chart, spoke with patient and wife to assess possible discharge needs. Explained Case Management role/services. Living Situation: verified demographics. Patient resides in a house with wife, son and grandson. Wife reports no concerns with patient's mobility in the home. ADLs: independent with reminders from wife. DME: cane - patient does not use at baseline    PT/OT Recs: Home health care   List provided. Wife to speak with niece and discuss. The Plan for Transition of Care is related to the following treatment goals: home care     The Patient and wife were provided with a choice of provider and agrees   with the discharge plan. [x] Yes [] No    Freedom of choice list was provided with basic dialogue that supports the patient's individualized plan of care/goals, treatment preferences and shares the quality data associated with the providers. [x] Yes [] No       Active Services: none      Transportation: wife      Medications: Verified insurance. Family reports no concerns regarding affording or obtaining medications. PCP: Dr. Alexi Schultz      HD/PD: n/a     PLAN/COMMENTS: return home with wife  ACP Completed. 1) Home care agency choice  2) May benefit from private duty home care list.     SW/CM provided contact information for patient or family to call with any questions. SW/CM will follow and assist as needed.     ALIE Martin, TITI, Social Work/Case Management   [phone]  Electronically signed by ALIE Martin LSW on 10/6/2022 at 3:34 PM

## 2022-10-06 NOTE — PLAN OF CARE
Problem: Discharge Planning  Goal: Discharge to home or other facility with appropriate resources  Outcome: Progressing  Flowsheets (Taken 10/6/2022 0059)  Discharge to home or other facility with appropriate resources:   Identify barriers to discharge with patient and caregiver   Arrange for needed discharge resources and transportation as appropriate   Identify discharge learning needs (meds, wound care, etc)     Problem: Pain  Goal: Verbalizes/displays adequate comfort level or baseline comfort level  Outcome: Progressing  Flowsheets (Taken 10/6/2022 0059)  Verbalizes/displays adequate comfort level or baseline comfort level:   Encourage patient to monitor pain and request assistance   Assess pain using appropriate pain scale   Administer analgesics based on type and severity of pain and evaluate response   Implement non-pharmacological measures as appropriate and evaluate response     Problem: Skin/Tissue Integrity  Goal: Absence of new skin breakdown  Outcome: Progressing     Problem: ABCDS Injury Assessment  Goal: Absence of physical injury  Outcome: Progressing  Flowsheets (Taken 10/6/2022 0059)  Absence of Physical Injury: Implement safety measures based on patient assessment     Problem: Safety - Adult  Goal: Free from fall injury  Outcome: Progressing  Flowsheets (Taken 10/6/2022 0059)  Free From Fall Injury: Instruct family/caregiver on patient safety

## 2022-10-06 NOTE — ACP (ADVANCE CARE PLANNING)
Advance Care Planning     Advance Care Planning Activator (Inpatient)  Conversation Note      Date of ACP Conversation: 10/6/2022     Conversation Conducted with:  Healthcare Decision Maker: Named in Advance Directive or Healthcare Power of  (name) wife Ronal Cade    ACP Activator: ALIE Love, [facility] Decision Maker:     Current Designated Health Care Decision Maker:     Primary Decision Maker: Guera Darryl  [phone]  Today we documented Decision Maker(s) consistent with Legal Next of Kin hierarchy. Care Preferences    Ventilation: \"If you were in your present state of health and suddenly became very ill and were unable to breathe on your own, what would your preference be about the use of a ventilator (breathing machine) if it were available to you? \"      Would the patient desire the use of ventilator (breathing machine)?: no    \"If your health worsens and it becomes clear that your chance of recovery is unlikely, what would your preference be about the use of a ventilator (breathing machine) if it were available to you? \"     Would the patient desire the use of ventilator (breathing machine)?: No      Resuscitation  \"CPR works best to restart the heart when there is a sudden event, like a heart attack, in someone who is otherwise healthy. Unfortunately, CPR does not typically restart the heart for people who have serious health conditions or who are very sick. \"    \"In the event your heart stopped as a result of an underlying serious health condition, would you want attempts to be made to restart your heart (answer \"yes\" for attempt to resuscitate) or would you prefer a natural death (answer \"no\" for do not attempt to resuscitate)? \" no       [] Yes   [] No   Educated Patient / Lisandro Nguyen regarding differences between Advance Directives and portable DNR orders.     Length of ACP Conversation in minutes: 5     Conversation Outcomes: Messaged Dr. Iqra Colby and requested they address with family and update code status in system to align with reported wishes.    [x] ACP discussion completed  [] Existing advance directive reviewed with patient; no changes to patient's previously recorded wishes  [] New Advance Directive completed  [] Portable Do Not Rescitate prepared for Provider review and signature  [] POLST/POST/MOLST/MOST prepared for Provider review and signature      Follow-up plan:    [] Schedule follow-up conversation to continue planning  [] Referred individual to Provider for additional questions/concerns   [] Advised patient/agent/surrogate to review completed ACP document and update if needed with changes in condition, patient preferences or care setting    [x] This note routed to one or more involved healthcare providers         Electronically signed by ALIE Pollock, TITI on 10/6/2022 at 3:40 PM

## 2022-10-06 NOTE — PROGRESS NOTES
Pt Name: Lalo Cervantes  Medical Record Number: [id number]  Date of Birth 1940   Today's Date: 10/6/2022      Subjective:  Patient awake, still very confused. ROS: Constitutional: No fever    Vitals:  Vitals:    10/06/22 0347 10/06/22 0757 10/06/22 0829 10/06/22 1129   BP:   139/72 120/70   Pulse:  58 86 76   Resp:  11 17 17   Temp:   97 °F (36.1 °C) 97.8 °F (36.6 °C)   TempSrc:   Oral Oral   SpO2:  94% 92% 91%   Weight: 216 lb 14.9 oz (98.4 kg)      Height:         I/O last 3 completed shifts:   In: 1360 [P.O.:360; I.V.:1000]  Out: 2025 [Urine:2025]    Exam:  General: Awake, disoriented, no acute distress  Respiratory: Nonlabored breathing  Abdomen: Soft, non-tender, non-distended, no masses  : ileal conduit with cherry colored output  Skin: Skin color, texture, turgor normal, no rashes or lesions  Neurologic: no gross deficits    CURRENT MEDICATIONS   Scheduled Meds:   citalopram  20 mg Oral Daily    divalproex  125 mg Oral Daily    OLANZapine  5 mg Oral BID    rosuvastatin  20 mg Oral Nightly    Vitamin D  1,000 Units Oral Daily    aspirin  81 mg Oral Daily    sodium chloride flush  5-40 mL IntraVENous 2 times per day    cefTRIAXone (ROCEPHIN) IV  2,000 mg IntraVENous Q24H     Continuous Infusions:   sodium chloride 100 mL/hr at 10/06/22 0930    sodium chloride       PRN Meds:.sodium chloride flush, sodium chloride, potassium chloride **OR** potassium alternative oral replacement **OR** potassium chloride, ondansetron **OR** ondansetron, polyethylene glycol, acetaminophen **OR** acetaminophen    LABS     Recent Labs     10/04/22  1832 10/05/22  0524 10/05/22  1018 10/06/22  0626   WBC  --   --  25.0* 19.2*   HGB  --   --  11.8* 10.3*   HCT  --   --  36.3* 31.9*   PLT  --   --  148 130*    137  --  141   K 4.1 4.4  --  4.0    101  --  108   CO2 21 18*  --  21   BUN 18 20  --  19   CREATININE 1.0 1.0  --  0.8   PHOS  --   --   --  1.3*   CALCIUM 9.2 9.3  --  9.6       ASSESSMENT Hospital day # 2  82y. o. male s/p endoscopic exploration of ileal conduit with biopsy and fulguration with Dr. Suha Hdz on 10/4/22  Post operatively became hypoxic - admitted to medicine  Pathology negative for malignancy- patient has no understanding of this. PLAN    Continue care per medicine  Will call wife to inform of negative pathology result.      Kaitlynn Hart, BRITANY - CNP 10/6/2022

## 2022-10-07 VITALS
SYSTOLIC BLOOD PRESSURE: 153 MMHG | WEIGHT: 216.71 LBS | HEART RATE: 73 BPM | DIASTOLIC BLOOD PRESSURE: 91 MMHG | OXYGEN SATURATION: 96 % | RESPIRATION RATE: 16 BRPM | HEIGHT: 68 IN | TEMPERATURE: 98.5 F | BODY MASS INDEX: 32.84 KG/M2

## 2022-10-07 PROBLEM — A41.9 SEPSIS (HCC): Status: ACTIVE | Noted: 2022-10-07

## 2022-10-07 LAB
ALBUMIN SERPL-MCNC: 3.1 G/DL (ref 3.4–5)
ANION GAP SERPL CALCULATED.3IONS-SCNC: 8 MMOL/L (ref 3–16)
BASOPHILS ABSOLUTE: 0.1 K/UL (ref 0–0.2)
BASOPHILS RELATIVE PERCENT: 0.5 %
BUN BLDV-MCNC: 11 MG/DL (ref 7–20)
CALCIUM SERPL-MCNC: 9.1 MG/DL (ref 8.3–10.6)
CHLORIDE BLD-SCNC: 107 MMOL/L (ref 99–110)
CO2: 24 MMOL/L (ref 21–32)
CREAT SERPL-MCNC: 0.6 MG/DL (ref 0.8–1.3)
EOSINOPHILS ABSOLUTE: 0.4 K/UL (ref 0–0.6)
EOSINOPHILS RELATIVE PERCENT: 3.5 %
GFR AFRICAN AMERICAN: >60
GFR NON-AFRICAN AMERICAN: >60
GLUCOSE BLD-MCNC: 96 MG/DL (ref 70–99)
HCT VFR BLD CALC: 31.8 % (ref 40.5–52.5)
HEMOGLOBIN: 10.6 G/DL (ref 13.5–17.5)
LYMPHOCYTES ABSOLUTE: 1.1 K/UL (ref 1–5.1)
LYMPHOCYTES RELATIVE PERCENT: 8.8 %
MCH RBC QN AUTO: 28.6 PG (ref 26–34)
MCHC RBC AUTO-ENTMCNC: 33.5 G/DL (ref 31–36)
MCV RBC AUTO: 85.5 FL (ref 80–100)
MONOCYTES ABSOLUTE: 0.6 K/UL (ref 0–1.3)
MONOCYTES RELATIVE PERCENT: 4.9 %
NEUTROPHILS ABSOLUTE: 9.9 K/UL (ref 1.7–7.7)
NEUTROPHILS RELATIVE PERCENT: 82.3 %
PDW BLD-RTO: 13.2 % (ref 12.4–15.4)
PHOSPHORUS: 1.8 MG/DL (ref 2.5–4.9)
PLATELET # BLD: 147 K/UL (ref 135–450)
PMV BLD AUTO: 8.3 FL (ref 5–10.5)
POTASSIUM SERPL-SCNC: 3.9 MMOL/L (ref 3.5–5.1)
RBC # BLD: 3.72 M/UL (ref 4.2–5.9)
SODIUM BLD-SCNC: 139 MMOL/L (ref 136–145)
WBC # BLD: 12 K/UL (ref 4–11)

## 2022-10-07 PROCEDURE — 2500000003 HC RX 250 WO HCPCS: Performed by: HOSPITALIST

## 2022-10-07 PROCEDURE — 2580000003 HC RX 258: Performed by: HOSPITALIST

## 2022-10-07 PROCEDURE — 36415 COLL VENOUS BLD VENIPUNCTURE: CPT

## 2022-10-07 PROCEDURE — 94760 N-INVAS EAR/PLS OXIMETRY 1: CPT

## 2022-10-07 PROCEDURE — 80069 RENAL FUNCTION PANEL: CPT

## 2022-10-07 PROCEDURE — 6360000002 HC RX W HCPCS: Performed by: HOSPITALIST

## 2022-10-07 PROCEDURE — 6370000000 HC RX 637 (ALT 250 FOR IP): Performed by: HOSPITALIST

## 2022-10-07 PROCEDURE — 85025 COMPLETE CBC W/AUTO DIFF WBC: CPT

## 2022-10-07 RX ORDER — CEFUROXIME AXETIL 500 MG/1
500 TABLET ORAL 2 TIMES DAILY
Qty: 14 TABLET | Refills: 0 | Status: SHIPPED | OUTPATIENT
Start: 2022-10-07 | End: 2022-10-17

## 2022-10-07 RX ORDER — LANOLIN ALCOHOL/MO/W.PET/CERES
3 CREAM (GRAM) TOPICAL NIGHTLY PRN
Qty: 30 TABLET | Refills: 3 | Status: SHIPPED | OUTPATIENT
Start: 2022-10-07 | End: 2022-10-31

## 2022-10-07 RX ADMIN — CITALOPRAM HYDROBROMIDE 20 MG: 20 TABLET ORAL at 09:54

## 2022-10-07 RX ADMIN — ASPIRIN 81 MG: 81 TABLET, COATED ORAL at 09:54

## 2022-10-07 RX ADMIN — SODIUM PHOSPHATE, MONOBASIC, MONOHYDRATE 20 MMOL: 276; 142 INJECTION, SOLUTION INTRAVENOUS at 11:20

## 2022-10-07 RX ADMIN — CEFTRIAXONE 2000 MG: 2 INJECTION, POWDER, FOR SOLUTION INTRAMUSCULAR; INTRAVENOUS at 15:41

## 2022-10-07 RX ADMIN — DIVALPROEX SODIUM 125 MG: 125 CAPSULE ORAL at 09:54

## 2022-10-07 RX ADMIN — Medication 1000 UNITS: at 09:54

## 2022-10-07 RX ADMIN — SODIUM CHLORIDE: 9 INJECTION, SOLUTION INTRAVENOUS at 03:23

## 2022-10-07 RX ADMIN — OLANZAPINE 5 MG: 5 TABLET, FILM COATED ORAL at 09:54

## 2022-10-07 RX ADMIN — Medication 10 ML: at 09:54

## 2022-10-07 NOTE — PROGRESS NOTES
Pt Name: Marilu Toro  Medical Record Number: [id number]  Date of Birth 1940   Today's Date: 10/7/2022      Subjective:  Patient awake, still very confused. ROS: Constitutional: No fever    Vitals:  Vitals:    10/06/22 2000 10/06/22 2351 10/07/22 0401 10/07/22 0403   BP: (!) 150/86 133/73 (!) 148/90    Pulse: 64 80 64    Resp: 17 12 18    Temp: 97.9 °F (36.6 °C) 97.7 °F (36.5 °C) 97.7 °F (36.5 °C)    TempSrc: Oral Axillary Axillary    SpO2: 93% 92% 94%    Weight:    216 lb 11.4 oz (98.3 kg)   Height:         I/O last 3 completed shifts: In: 2677.2 [P.O.:300;  I.V.:2298; IV Piggyback:79.3]  Out: 5325 [Urine:5325]    Exam:  General: Awake, disoriented, no acute distress  Respiratory: Nonlabored breathing  Abdomen: Soft, non-tender, non-distended, no masses  : ileal conduit with cherry colored output - improving  Skin: Skin color, texture, turgor normal, no rashes or lesions  Neurologic: no gross deficits    CURRENT MEDICATIONS   Scheduled Meds:   sodium phosphate IVPB  20 mmol IntraVENous Once    QUEtiapine  25 mg Oral Daily    citalopram  20 mg Oral Daily    divalproex  125 mg Oral Daily    OLANZapine  5 mg Oral BID    rosuvastatin  20 mg Oral Nightly    Vitamin D  1,000 Units Oral Daily    aspirin  81 mg Oral Daily    sodium chloride flush  5-40 mL IntraVENous 2 times per day    cefTRIAXone (ROCEPHIN) IV  2,000 mg IntraVENous Q24H     Continuous Infusions:   sodium chloride       PRN Meds:.sodium chloride flush, sodium chloride, potassium chloride **OR** potassium alternative oral replacement **OR** potassium chloride, ondansetron **OR** ondansetron, polyethylene glycol, acetaminophen **OR** acetaminophen    LABS     Recent Labs     10/05/22  0524 10/05/22  1018 10/06/22  0626 10/07/22  0544   WBC  --  25.0* 19.2* 12.0*   HGB  --  11.8* 10.3* 10.6*   HCT  --  36.3* 31.9* 31.8*   PLT  --  148 130* 147     --  141 139   K 4.4  --  4.0 3.9     --  108 107   CO2 18*  --  21 24   BUN 20 --  19 11   CREATININE 1.0  --  0.8 0.6*   PHOS  --   --  1.3* 1.8*   CALCIUM 9.3  --  9.6 9.1       [address] day # 3  82y. o. male s/p endoscopic exploration of ileal conduit with biopsy and fulguration with Dr. Bibi Shin on 10/4/22  Post operatively became hypoxic - admitted to medicine  Pathology negative for malignancy- patient has no understanding of this. Hematuria is expected. PLAN    Continue care per medicine  Left message on  for wife regarding negative pathology result.      Will sign off, call with questions    Sneha Morning, APRN - CNP 10/7/2022

## 2022-10-07 NOTE — PLAN OF CARE
Problem: Discharge Planning  Goal: Discharge to home or other facility with appropriate resources  10/7/2022 1253 by Aamir Velasquez RN  Outcome: Progressing     Problem: Pain  Goal: Verbalizes/displays adequate comfort level or baseline comfort level  10/7/2022 1253 by Aamir Velasquez RN  Outcome: Progressing     Problem: Skin/Tissue Integrity  Goal: Absence of new skin breakdown  Description: 1. Monitor for areas of redness and/or skin breakdown  2. Assess vascular access sites hourly  3. Every 4-6 hours minimum:  Change oxygen saturation probe site  4. Every 4-6 hours:  If on nasal continuous positive airway pressure, respiratory therapy assess nares and determine need for appliance change or resting period.   10/7/2022 1253 by Aamir Velasquez RN  Outcome: Progressing     Problem: ABCDS Injury Assessment  Goal: Absence of physical injury  10/7/2022 1253 by Aamir Velasquez RN  Outcome: Progressing     Problem: Safety - Adult  Goal: Free from fall injury  10/7/2022 1253 by Aamir Velasquez RN  Outcome: Progressing     Problem: Genitourinary - Adult  Goal: Urinary catheter remains patent  10/7/2022 1253 by Aamir Velasquez RN  Outcome: Progressing     Problem: Infection - Adult  Goal: Absence of infection at discharge  10/7/2022 1253 by Aamir Velasquez RN  Outcome: Progressing     Problem: Hematologic - Adult  Goal: Maintains hematologic stability  10/7/2022 1253 by Aamir Velasquez RN  Outcome: Progressing

## 2022-10-07 NOTE — PROGRESS NOTES
Patient discharged to home via wheelchair with wife at 81. Telemetry and IV removed with no complications. Meds to beds delivered. Discharge instructions reviewed with wife who verbalized understanding.   Electronically signed by Grace Lino RN on 10/7/2022 at 5:17 PM

## 2022-10-07 NOTE — CARE COORDINATION
CASE MANAGEMENT DISCHARGE SUMMARY:    DISCHARGE DATE: 10/7/2022    DISCHARGED TO: Home with family support     HOME CARE AGENCY: Wife declines needing home care           TRANSPORTATION: Wife             TIME: 3pm     COMMENTS: Met with patient & wife. Wife agrees to discharge home today. Declines needing home care but did request a [facility] referral to assistance with dressing/bathing. Referral made to [facility]. Wife transport. No additional needs to note.     Electronically signed by Rodney Newell RN Case Management [phone] on 10/7/2022 at 2:17 PM

## 2022-10-07 NOTE — DISCHARGE SUMMARY
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 male hospitalized   10/4/2022   after he was noted to have fever, hypoxia,. Patient has cystoscopy for gross hematuria. He was noted to have significant leukocytosis, elevated procalcitonin. He was started on broad-spectrum antibiotics, blood and urine culture collected after antibiotics were given. Patient symptoms improved, leukocytosis improved, procalcitonin improved. Blood culture remain negative. Patient discharged on Ceftin, he is to follow-up with urology and PCP as an outpatient. Plan of care discussed with the patient's wife. Patient seen and examined in the day of discharge. Vital signs reviewed. BP (!) 155/95   Pulse 88   Temp 97.4 °F (36.3 °C) (Oral)   Resp 14   Ht 5' 8\" (1.727 m)   Wt 216 lb 11.4 oz (98.3 kg)   SpO2 94%   BMI 32.95 kg/m²     Patient alert, oriented to self, family, poor insight, clear lungs, normal first and second sound abdomen obese, improved hematuria in the urostomy site noted*. Patient reached the maximum benefit of this hospitalization. Patient  was hemodynamically stable on discharge   Available consultant are in agreement with discharge planning.   Patient symptoms was controlled and in agreement with discharge planning. Patient Instructions:    Discharge lab/important testing/finding that need follow up : Outpatient follow-up with urology    Final blood culture data    Activity: activity as tolerated    Diet: ADULT DIET; Regular      Follow-up visits:   [facility] [address] [facility]  [phone]    Schedule an appointment as soon as possible for a visit in 1 week(s)      [facility] [address] [facility]  [phone]               Discharge Medications:       Current Discharge Medication List        START taking these medications    Details   melatonin (RA MELATONIN) 3 MG TABS tablet Take 1 tablet by mouth nightly as needed (sleep)  Qty: 30 tablet, Refills: 3      cefUROXime (CEFTIN) 500 MG tablet Take 1 tablet by mouth 2 times daily for 10 days  Qty: 14 tablet, Refills: 0           Current Discharge Medication List        Current Discharge Medication List        CONTINUE these medications which have NOT CHANGED    Details   divalproex (DEPAKOTE SPRINKLE) 125 MG capsule TAKE ONE CAPSULE BY MOUTH DAILY      OLANZapine (ZYPREXA) 5 MG tablet Take 5 mg by mouth 2 times daily      rosuvastatin (CRESTOR) 20 MG tablet TAKE ONE TABLET BY MOUTH EVERY NIGHT AT BEDTIME  Qty: 90 tablet, Refills: 1      citalopram (CELEXA) 20 MG tablet Take 20 mg by mouth daily      Vitamin D (CHOLECALCIFEROL) 1000 UNITS CAPS capsule Take 1,000 Units by mouth daily. Current Discharge Medication List        STOP taking these medications       aspirin 81 MG EC tablet Comments:   Reason for Stopping:                 Labs:  For convenience and continuity at follow-up the following most recent labs are provided:      CBC:    Lab Results   Component Value Date/Time    WBC 12.0 10/07/2022 05:44 AM    HGB 10.6 10/07/2022 05:44 AM    HCT 31.8 10/07/2022 05:44 AM     10/07/2022 05:44 AM       Renal:    Lab Results   Component Value Date/Time     10/07/2022 05:44 AM    K 3.9 10/07/2022 05:44 AM    K 4.4 10/05/2022 05:24 AM     10/07/2022 05:44 AM    CO2 24 10/07/2022 05:44 AM    BUN 11 10/07/2022 05:44 AM    CREATININE 0.6 10/07/2022 05:44 AM    CALCIUM 9.1 10/07/2022 05:44 AM    PHOS 1.8 10/07/2022 05:44 AM         Significant Diagnostic Studies    Radiology:   XR CHEST PORTABLE   Final Result   No acute cardiopulmonary findings. Time Spent on discharge is 35 in the examination, evaluation, counseling and review of medications and discharge plan. Thank you Adair Thomas MD for the opportunity to be involved in this patient's care.          Electronically signed by Joellen Pelaez MD on 10/7/2022 at 12:33 PM

## 2022-10-07 NOTE — PLAN OF CARE
Problem: Discharge Planning  Goal: Discharge to home or other facility with appropriate resources  Outcome: Progressing  Flowsheets (Taken 10/7/2022 0400)  Discharge to home or other facility with appropriate resources:   Identify barriers to discharge with patient and caregiver   Arrange for needed discharge resources and transportation as appropriate   Identify discharge learning needs (meds, wound care, etc)   Refer to discharge planning if patient needs post-hospital services based on physician order or complex needs related to functional status, cognitive ability or social support system     Problem: Pain  Goal: Verbalizes/displays adequate comfort level or baseline comfort level  Outcome: Progressing  Flowsheets (Taken 10/7/2022 0400)  Verbalizes/displays adequate comfort level or baseline comfort level:   Encourage patient to monitor pain and request assistance   Assess pain using appropriate pain scale   Administer analgesics based on type and severity of pain and evaluate response   Implement non-pharmacological measures as appropriate and evaluate response   Consider cultural and social influences on pain and pain management   Notify Licensed Independent Practitioner if interventions unsuccessful or patient reports new pain     Problem: Skin/Tissue Integrity  Goal: Absence of new skin breakdown  Description: 1. Monitor for areas of redness and/or skin breakdown  2. Assess vascular access sites hourly  3. Every 4-6 hours minimum:  Change oxygen saturation probe site  4. Every 4-6 hours:  If on nasal continuous positive airway pressure, respiratory therapy assess nares and determine need for appliance change or resting period.   Outcome: Progressing     Problem: ABCDS Injury Assessment  Goal: Absence of physical injury  Outcome: Progressing  Flowsheets (Taken 10/7/2022 0400)  Absence of Physical Injury: Implement safety measures based on patient assessment     Problem: Safety - Adult  Goal: Free from fall injury  Outcome: Progressing  Flowsheets (Taken 10/7/2022 0400)  Free From Fall Injury: Instruct family/caregiver on patient safety     Problem: Genitourinary - Adult  Goal: Urinary catheter remains patent  Outcome: Progressing  Flowsheets (Taken 10/7/2022 0400)  Urinary catheter remains patent:   Assess patency of urinary catheter   Irrigate catheter per Licensed Independent Practitioner order if indicated and notify Licensed Independent Practitioner if unable to irrigate   Assess need for a larger catheter size or a 3-way catheter for continuous bladder irrigation     Problem: Infection - Adult  Goal: Absence of infection at discharge  Outcome: Progressing  Flowsheets (Taken 10/7/2022 0400)  Absence of infection at discharge:   Assess and monitor for signs and symptoms of infection   Monitor lab/diagnostic results   Monitor all insertion sites i.e., indwelling lines, tubes and drains   [place] appropriate cooling/warming therapies per order   Administer medications as ordered   Instruct and encourage patient and family to use good hand hygiene technique   Identify and instruct in appropriate isolation precautions for identified infection/condition     Problem: Hematologic - Adult  Goal: Maintains hematologic stability  Outcome: Progressing  Flowsheets (Taken 10/7/2022 0400)  Maintains hematologic stability:   Assess for signs and symptoms of bleeding or hemorrhage   Monitor labs for bleeding or clotting disorders   Administer blood products/factors as ordered

## 2022-10-09 LAB
BLOOD CULTURE, ROUTINE: NORMAL
CULTURE, BLOOD 2: NORMAL

## 2022-10-10 ENCOUNTER — CARE COORDINATION (OUTPATIENT)
Dept: CASE MANAGEMENT | Age: 82
End: 2022-10-10

## 2022-10-10 NOTE — CARE COORDINATION
[address] Care Transitions Initial Follow Up Call    Call within 2 business days of discharge: Yes    Care Transition Nurse contacted the patient and family by telephone to perform post hospital discharge assessment. Verified name and  with family as identifiers. Provided introduction to self, and explanation of the Care Transition Nurse role. Patient: Brandie Bird Patient : 1940   MRN: [id number]  Reason for Admission: UTI; sepsis  Discharge Date: 10/7/22 RARS: Readmission Risk Score: 13.7      Last Discharge  [address]       Date Complaint Diagnosis Description Type Department Provider    10/4/22  Gross hematuria Admission (Discharged) Jacky Jennings MD            Was this an external facility discharge? No Discharge Facility: [facility] to be reviewed by the provider   Additional needs identified to be addressed with provider: No  none               Method of communication with provider: none. Spoke with pt's wife who states pt seems to be doing ok. Reports urine has become lighter but is still pink tinged. Denies fever or flu-like symptoms. Pt has dementia and pt's wife states his mental status is at baseline. Medications reviewed and she reports he's taking them as prescribed. His food and fluid intake is good. Discussed completing the entire course of antibiotics even if symptoms resolve. Pt's wife states she thought pt was supposed to receive 10 day course of antibiotics but he only received 14 tablets (7 days). Advised her to discuss this with PCP or urologist, whichever he sees first. She is awaiting call back from urologist for f/u appt. She agrees to move up PCP appt to within 7 days of discharge. Care Transition Nurse reviewed discharge instructions with patient who verbalized understanding. The patient was given an opportunity to ask questions and does not have any further questions or concerns at this time.  Were discharge instructions available to patient? Yes. Reviewed appropriate site of care based on symptoms and resources available to patient including: PCP  Specialist  When to call 911. The patient agrees to contact the PCP office for questions related to their healthcare. Medication reconciliation was performed with family, who verbalizes understanding of administration of home medications. Medications reviewed, 1111F entered: yes    Was patient discharged with a pulse oximeter? no    Non-face-to-face services provided:  Obtained and reviewed discharge summary and/or continuity of care documents  Education of patient/family/caregiver/guardian to support self-management-   Assessment and support for treatment adherence and medication management-     Offered patient enrollment in the Remote Patient Monitoring (RPM) program for in-home monitoring: Patient is not eligible for RPM program.    Care Transitions 24 Hour Call    Care Transitions Interventions         Follow Up  Future Appointments   Date Time Provider Chris Flanagan   10/31/2022 10:15 AM Vitaly Corea MD [facility] Transition Nurse provided contact information. Plan for follow-up call in 5-7 days based on severity of symptoms and risk factors.   Plan for next call: symptom management-'  self management-     Xavier Torres

## 2022-10-13 ENCOUNTER — CARE COORDINATION (OUTPATIENT)
Dept: CASE MANAGEMENT | Age: 82
End: 2022-10-13

## 2022-10-13 NOTE — CARE COORDINATION
[facility] Care Transitions Follow Up Call    Care Transition Nurse contacted the family by telephone to follow up after admission on 10/4/22. Verified name and  with family as identifiers. Patient: Blank Pastor  Patient : 1940   MRN: [id number]  Reason for Admission: hypoxia, sepsis  Discharge Date: 10/7/22 RARS: Readmission Risk Score: 13.7      Needs to be reviewed by the provider   Additional needs identified to be addressed with provider: No  none             Method of communication with provider: none. Conversation:  Spoke to Aki after 2 IDs. Pt with dementia so spoke to the wife. He is doing well. His urine is still with blood but not as murky and dark. Dr Riley King wants pt to finish antibiotics, wait a week, then will schedule an appt. Pt is drinking fluids, eating and sleeping well. On 10/31 has PCP appt. They have stopped the baby ASA Agreeable to future calls. Addressed changes since last contact:   reviewed upcoming appts and MD advice. Ensured plenty of   Discussed follow-up appointments. If no appointment was previously scheduled, appointment scheduling offered: Yes. Is follow up appointment scheduled within 7 days of discharge? Yes. Follow Up  Future Appointments   Date Time Provider Chris Flanagan   10/31/2022 10:15 AM Jackie Villareal MD [facility] [place]     [facility] follow up appointment(s):     Care Transition Nurse reviewed discharge instructions with family and discussed any barriers to care and/or understanding of plan of care after discharge. Discussed appropriate site of care based on symptoms and resources available to patient including: PCP. The family agrees to contact the PCP office for questions related to their healthcare. Advance Care Planning:   not on file.      Patients top risk factors for readmission: medical condition-blood in urine  Interventions to address risk factors: Education of patient/family/caregiver/guardian to support self-management-monitoring urine and drinking fluids    Offered patient enrollment in the Remote Patient Monitoring (RPM) program for in-home monitoring: NA.     Care Transitions Subsequent and Final Call    Subsequent and Final Calls  Care Transitions Interventions  Other Interventions:             Care Transition Nurse provided contact information for future needs. Plan for follow-up call in 7-10 days based on severity of symptoms and risk factors.   Plan for next call: symptom management-  Assess overall status, abnormal signs and symptoms, availability and effectiveness of medications, activity level and tolerance, falls/other unexpected events, findings from follow up appointments, seeking medical attention, who/when to call prn any needs, etc.     ESTEPHANIA FuentesN, RN   [address] [place] [address] Transition Nurse  [phone]

## 2022-10-20 ENCOUNTER — CARE COORDINATION (OUTPATIENT)
Dept: CASE MANAGEMENT | Age: 82
End: 2022-10-20

## 2022-10-20 NOTE — CARE COORDINATION
[address] Care Transitions Follow Up Call    Care Transition Nurse contacted the family by telephone to follow up after admission. Verified name and  with family as identifiers. Patient: Og Chong  Patient : 1940   MRN: [id number]  Reason for Admission: UTI, sepsis  Discharge Date: 10/7/22 RARS: Readmission Risk Score: 13.7      Needs to be reviewed by the provider   Additional needs identified to be addressed with provider: No  none             Method of communication with provider: none. Spoke with Wife Tracey To on the phone today. States pt is doing well. He is still having a pinkish/red tinge to urine but Tracey To has spoke to Dr. Lucas Yi and he has no concern. Pt is eating and drinking iwthout issues. Pt finished antibiotics this was a 7 day course. States Dr. Lucas iY does not want a follow up until his 6 month appt and PCP has an appt set for annual visit 10/31. No questions or concerns at this time. Agreeable to f/u calls. Addressed changes since last contact:  none  Discussed follow-up appointments. If no appointment was previously scheduled, appointment scheduling offered: No.   Is follow up appointment scheduled within 7 days of discharge? No.    Follow Up  Future Appointments   Date Time Provider Chris Flanagan   10/31/2022 10:15 AM Charles Zpaata MD [facility] [place] [facility] [place]     Non-[facility] follow up appointment(s): n/a    Care Transition Nurse reviewed red flags with family and discussed any barriers to care and/or understanding of plan of care after discharge. Discussed appropriate site of care based on symptoms and resources available to patient including: PCP  Specialist. The family agrees to contact the PCP office for questions related to their healthcare.        Offered patient enrollment in the Remote Patient Monitoring (RPM) program for in-home monitoring: NA.     Care Transitions Subsequent and Final Call    Subsequent and Final Calls  Do you have any ongoing symptoms?: Yes  Have your medications changed?: Yes  Patient Reports: finished antibiotics  Do you have any questions related to your medications?: No  Do you currently have any active services?: No  Do you have any needs or concerns that I can assist you with?: No  Identified Barriers: None  Care Transitions Interventions  Other Interventions:             Care Transition Nurse provided contact information for future needs. Plan for follow-up call in 5-7 days based on severity of symptoms and risk factors. Plan for next call: symptom management-.     JOSEPHINE Forrest, RN   Care Transition Nurse  Mobile: [phone]

## 2022-10-27 ENCOUNTER — CARE COORDINATION (OUTPATIENT)
Dept: CASE MANAGEMENT | Age: 82
End: 2022-10-27

## 2022-10-27 NOTE — CARE COORDINATION
[facility] Care Transitions Follow Up Call    Care Transition Nurse contacted the family by telephone to follow up after admission. Verified name and  with family as identifiers. Patient: Elizabeth Knowles  Patient : 1940   MRN: [id number]  Reason for Admission: UTI, Hematuria  Discharge Date: 10/7/22 RARS: Readmission Risk Score: 13.7      Needs to be reviewed by the provider   Additional needs identified to be addressed with provider: No  none             Method of communication with provider: none. Spoke to Merline Mouse, pt wife who reports pt is taking a nap at time of call. States he is doing ok. Urine is clearing up along with blood tinge color. States pt is able to eat and drink without issues. Does report he has cognitive issues but physically strong. States she does not feel his memory issues are due to another UTI. Merline Mouse has no questions or concerns today. Will continue outreach. Addressed changes since last contact:  none  Discussed follow-up appointments. If no appointment was previously scheduled, appointment scheduling offered: No.   Is follow up appointment scheduled within 7 days of discharge? No.    Follow Up  Future Appointments   Date Time Provider Chris Flanagan   10/31/2022 10:15 AM [facility] [place] [facility]     Non[facility] follow up appointment(s): n/a    Care Transition Nurse reviewed red flags with family and discussed any barriers to care and/or understanding of plan of care after discharge. Discussed appropriate site of care based on symptoms and resources available to patient including: PCP. The family agrees to contact the PCP office for questions related to their healthcare.      Offered patient enrollment in the Remote Patient Monitoring (RPM) program for in-home monitoring: NA.     Care Transitions Subsequent and Final Call    Subsequent and Final Calls  Do you have any ongoing symptoms?: No  Have your medications changed?: No  Do you have any questions related to your medications?: No  Do you currently have any active services?: No  Do you have any needs or concerns that I can assist you with?: No  Identified Barriers: None  Care Transitions Interventions  Other Interventions:             Care Transition Nurse provided contact information for future needs. Plan for follow-up call in 5-7 days based on severity of symptoms and risk factors.   Plan for next call: symptom management-,    JOSEPHINE Grant, RN   Care Transition Nurse  Mobile: [phone]

## 2022-11-09 ENCOUNTER — CARE COORDINATION (OUTPATIENT)
Dept: CASE MANAGEMENT | Age: 82
End: 2022-11-09

## 2022-11-09 NOTE — CARE COORDINATION
[facility] Care Transitions Follow Up Call    LPN Care Coordinator contacted the family by telephone to follow up after admission on 10/4-10/7. Verified name and  with family as identifiers. Patient: Yuri Dunne  Patient : 1940   MRN: [id number]  Reason for Admission: sepsis d/t UTI, dementia  Discharge Date: 10/7/22 RARS: Readmission Risk Score: 13.7      Needs to be reviewed by the provider   Additional needs identified to be addressed with provider: No  none             Method of communication with provider: none. GEORGE CC spoke with patient's wife, Unique Bundy, HIPAA verified. States patient is doing fine. Denies hematuria. Denies problems with ADLs. Confusion at baseline. Appetite fine. Denies problems with bowels or bladder. Denies medication changes. Denies needs. Lorin Aparicio  [facility]  Care Transitions  [phone]    Addressed changes since last contact:  none  Discussed follow-up appointments. If no appointment was previously scheduled, appointment scheduling offered: Yes. Is follow up appointment scheduled within 7 days of discharge? Yes. Follow Up  Future Appointments   Date Time Provider Chris Flanagan   2023 10:00 AM Driss Todd MD [facility] [place]     Non-[facility] follow up appointment(s): NA    LPN Care Coordinator reviewed medical action plan and red flags with family and discussed any barriers to care and/or understanding of plan of care after discharge. Discussed appropriate site of care based on symptoms and resources available to patient including: PCP  Specialist  Urgent care clinics  When to call 911. The family agrees to contact the PCP office for questions related to their healthcare. Advance Care Planning:   not on file.      Patients top risk factors for readmission: medical condition-sepsis d/t UTI  Interventions to address risk factors: Assessment and support for treatment adherence and medication management-denied new or changed    Offered patient enrollment in the Remote Patient Monitoring (RPM) program for in-home monitoring: Yes, but did not enroll at this time. Care Transitions Subsequent and Final Call    Subsequent and Final Calls  Do you have any ongoing symptoms?: No  Have your medications changed?: No  Do you have any questions related to your medications?: No  Do you currently have any active services?: No  Do you have any needs or concerns that I can assist you with?: No  Identified Barriers: None  Care Transitions Interventions  Other Interventions:             LPN Care Coordinator provided contact information for future needs. No further follow-up call indicated based on severity of symptoms and risk factors.     Edgard Fuentes LPN

## 2024-11-29 ENCOUNTER — APPOINTMENT (OUTPATIENT)
Dept: CT IMAGING | Age: 84
DRG: 562 | End: 2024-11-29
Payer: MEDICARE

## 2024-11-29 ENCOUNTER — APPOINTMENT (OUTPATIENT)
Dept: GENERAL RADIOLOGY | Age: 84
DRG: 562 | End: 2024-11-29
Payer: MEDICARE

## 2024-11-29 ENCOUNTER — HOSPITAL ENCOUNTER (INPATIENT)
Age: 84
LOS: 4 days | Discharge: NURSING, REHAB OR LONG-TERM CARE | DRG: 562 | End: 2024-12-03
Attending: INTERNAL MEDICINE | Admitting: INTERNAL MEDICINE
Payer: MEDICARE

## 2024-11-29 DIAGNOSIS — S42.212A CLOSED DISPLACED FRACTURE OF SURGICAL NECK OF LEFT HUMERUS, UNSPECIFIED FRACTURE MORPHOLOGY, INITIAL ENCOUNTER: Primary | ICD-10-CM

## 2024-11-29 DIAGNOSIS — R29.6 FREQUENT FALLS: ICD-10-CM

## 2024-11-29 DIAGNOSIS — Z86.59 HISTORY OF DEMENTIA: ICD-10-CM

## 2024-11-29 DIAGNOSIS — R53.1 GENERAL WEAKNESS: ICD-10-CM

## 2024-11-29 DIAGNOSIS — R53.81 PHYSICAL DEBILITY: ICD-10-CM

## 2024-11-29 DIAGNOSIS — R55 SYNCOPE, UNSPECIFIED SYNCOPE TYPE: ICD-10-CM

## 2024-11-29 PROBLEM — S42.292A CLOSED FRACTURE OF HEAD OF LEFT HUMERUS, INITIAL ENCOUNTER: Status: ACTIVE | Noted: 2024-11-29

## 2024-11-29 LAB
ALBUMIN SERPL-MCNC: 3.6 G/DL (ref 3.4–5)
ALBUMIN/GLOB SERPL: 1.2 {RATIO} (ref 1.1–2.2)
ALP SERPL-CCNC: 56 U/L (ref 40–129)
ALT SERPL-CCNC: 33 U/L (ref 10–40)
ANION GAP SERPL CALCULATED.3IONS-SCNC: 10 MMOL/L (ref 3–16)
AST SERPL-CCNC: 41 U/L (ref 15–37)
BASOPHILS # BLD: 0 K/UL (ref 0–0.2)
BASOPHILS NFR BLD: 0.6 %
BILIRUB SERPL-MCNC: 1 MG/DL (ref 0–1)
BUN SERPL-MCNC: 30 MG/DL (ref 7–20)
CALCIUM SERPL-MCNC: 10.5 MG/DL (ref 8.3–10.6)
CHLORIDE SERPL-SCNC: 104 MMOL/L (ref 99–110)
CO2 SERPL-SCNC: 26 MMOL/L (ref 21–32)
CREAT SERPL-MCNC: 0.9 MG/DL (ref 0.8–1.3)
DEPRECATED RDW RBC AUTO: 13.6 % (ref 12.4–15.4)
EOSINOPHIL # BLD: 0.1 K/UL (ref 0–0.6)
EOSINOPHIL NFR BLD: 1.5 %
GFR SERPLBLD CREATININE-BSD FMLA CKD-EPI: 85 ML/MIN/{1.73_M2}
GLUCOSE SERPL-MCNC: 136 MG/DL (ref 70–99)
HCT VFR BLD AUTO: 36.3 % (ref 40.5–52.5)
HGB BLD-MCNC: 12.2 G/DL (ref 13.5–17.5)
LYMPHOCYTES # BLD: 1.7 K/UL (ref 1–5.1)
LYMPHOCYTES NFR BLD: 22.9 %
MCH RBC QN AUTO: 28.9 PG (ref 26–34)
MCHC RBC AUTO-ENTMCNC: 33.6 G/DL (ref 31–36)
MCV RBC AUTO: 86.2 FL (ref 80–100)
MONOCYTES # BLD: 0.7 K/UL (ref 0–1.3)
MONOCYTES NFR BLD: 9.2 %
NEUTROPHILS # BLD: 5 K/UL (ref 1.7–7.7)
NEUTROPHILS NFR BLD: 65.8 %
NT-PROBNP SERPL-MCNC: 172 PG/ML (ref 0–449)
PLATELET # BLD AUTO: 205 K/UL (ref 135–450)
PMV BLD AUTO: 7.8 FL (ref 5–10.5)
POTASSIUM SERPL-SCNC: 4 MMOL/L (ref 3.5–5.1)
PROT SERPL-MCNC: 6.6 G/DL (ref 6.4–8.2)
RBC # BLD AUTO: 4.21 M/UL (ref 4.2–5.9)
SODIUM SERPL-SCNC: 140 MMOL/L (ref 136–145)
TROPONIN, HIGH SENSITIVITY: 30 NG/L (ref 0–22)
TROPONIN, HIGH SENSITIVITY: 33 NG/L (ref 0–22)
TSH SERPL DL<=0.005 MIU/L-ACNC: 2.17 UIU/ML (ref 0.27–4.2)
WBC # BLD AUTO: 7.6 K/UL (ref 4–11)

## 2024-11-29 PROCEDURE — 93005 ELECTROCARDIOGRAM TRACING: CPT | Performed by: GENERAL ACUTE CARE HOSPITAL

## 2024-11-29 PROCEDURE — 72170 X-RAY EXAM OF PELVIS: CPT

## 2024-11-29 PROCEDURE — 72125 CT NECK SPINE W/O DYE: CPT

## 2024-11-29 PROCEDURE — 84484 ASSAY OF TROPONIN QUANT: CPT

## 2024-11-29 PROCEDURE — 1200000000 HC SEMI PRIVATE

## 2024-11-29 PROCEDURE — 85025 COMPLETE CBC W/AUTO DIFF WBC: CPT

## 2024-11-29 PROCEDURE — 73060 X-RAY EXAM OF HUMERUS: CPT

## 2024-11-29 PROCEDURE — 80053 COMPREHEN METABOLIC PANEL: CPT

## 2024-11-29 PROCEDURE — 70450 CT HEAD/BRAIN W/O DYE: CPT

## 2024-11-29 PROCEDURE — 6370000000 HC RX 637 (ALT 250 FOR IP): Performed by: INTERNAL MEDICINE

## 2024-11-29 PROCEDURE — 73090 X-RAY EXAM OF FOREARM: CPT

## 2024-11-29 PROCEDURE — 83880 ASSAY OF NATRIURETIC PEPTIDE: CPT

## 2024-11-29 PROCEDURE — 96376 TX/PRO/DX INJ SAME DRUG ADON: CPT

## 2024-11-29 PROCEDURE — 96374 THER/PROPH/DIAG INJ IV PUSH: CPT

## 2024-11-29 PROCEDURE — 6360000002 HC RX W HCPCS: Performed by: GENERAL ACUTE CARE HOSPITAL

## 2024-11-29 PROCEDURE — 96375 TX/PRO/DX INJ NEW DRUG ADDON: CPT

## 2024-11-29 PROCEDURE — 2580000003 HC RX 258: Performed by: INTERNAL MEDICINE

## 2024-11-29 PROCEDURE — 36415 COLL VENOUS BLD VENIPUNCTURE: CPT

## 2024-11-29 PROCEDURE — 99285 EMERGENCY DEPT VISIT HI MDM: CPT

## 2024-11-29 PROCEDURE — 6360000002 HC RX W HCPCS: Performed by: INTERNAL MEDICINE

## 2024-11-29 PROCEDURE — 84443 ASSAY THYROID STIM HORMONE: CPT

## 2024-11-29 PROCEDURE — 71045 X-RAY EXAM CHEST 1 VIEW: CPT

## 2024-11-29 RX ORDER — ENOXAPARIN SODIUM 100 MG/ML
40 INJECTION SUBCUTANEOUS NIGHTLY
Status: DISCONTINUED | OUTPATIENT
Start: 2024-11-29 | End: 2024-12-03 | Stop reason: HOSPADM

## 2024-11-29 RX ORDER — MORPHINE SULFATE 2 MG/ML
2 INJECTION, SOLUTION INTRAMUSCULAR; INTRAVENOUS ONCE
Status: COMPLETED | OUTPATIENT
Start: 2024-11-29 | End: 2024-11-29

## 2024-11-29 RX ORDER — ONDANSETRON 2 MG/ML
4 INJECTION INTRAMUSCULAR; INTRAVENOUS ONCE
Status: COMPLETED | OUTPATIENT
Start: 2024-11-29 | End: 2024-11-29

## 2024-11-29 RX ORDER — ONDANSETRON 2 MG/ML
4 INJECTION INTRAMUSCULAR; INTRAVENOUS EVERY 6 HOURS PRN
Status: DISCONTINUED | OUTPATIENT
Start: 2024-11-29 | End: 2024-12-03 | Stop reason: HOSPADM

## 2024-11-29 RX ORDER — SODIUM CHLORIDE 0.9 % (FLUSH) 0.9 %
5-40 SYRINGE (ML) INJECTION PRN
Status: DISCONTINUED | OUTPATIENT
Start: 2024-11-29 | End: 2024-12-03 | Stop reason: HOSPADM

## 2024-11-29 RX ORDER — ACETAMINOPHEN 500 MG
1000 TABLET ORAL EVERY 8 HOURS PRN
Status: DISCONTINUED | OUTPATIENT
Start: 2024-11-29 | End: 2024-12-03 | Stop reason: HOSPADM

## 2024-11-29 RX ORDER — ONDANSETRON 4 MG/1
4 TABLET, ORALLY DISINTEGRATING ORAL EVERY 8 HOURS PRN
Status: DISCONTINUED | OUTPATIENT
Start: 2024-11-29 | End: 2024-12-03 | Stop reason: HOSPADM

## 2024-11-29 RX ORDER — HYDROCODONE BITARTRATE AND ACETAMINOPHEN 5; 325 MG/1; MG/1
1 TABLET ORAL EVERY 6 HOURS PRN
Status: DISCONTINUED | OUTPATIENT
Start: 2024-11-29 | End: 2024-12-03 | Stop reason: HOSPADM

## 2024-11-29 RX ORDER — DIVALPROEX SODIUM 125 MG/1
250 CAPSULE, COATED PELLETS ORAL 2 TIMES DAILY
Status: DISCONTINUED | OUTPATIENT
Start: 2024-11-29 | End: 2024-12-03 | Stop reason: HOSPADM

## 2024-11-29 RX ORDER — CITALOPRAM HYDROBROMIDE 20 MG/1
20 TABLET ORAL DAILY
Status: DISCONTINUED | OUTPATIENT
Start: 2024-11-29 | End: 2024-12-03 | Stop reason: HOSPADM

## 2024-11-29 RX ORDER — SODIUM CHLORIDE 9 MG/ML
INJECTION, SOLUTION INTRAVENOUS PRN
Status: DISCONTINUED | OUTPATIENT
Start: 2024-11-29 | End: 2024-12-03 | Stop reason: HOSPADM

## 2024-11-29 RX ORDER — MORPHINE SULFATE 2 MG/ML
2 INJECTION, SOLUTION INTRAMUSCULAR; INTRAVENOUS EVERY 6 HOURS PRN
Status: DISCONTINUED | OUTPATIENT
Start: 2024-11-29 | End: 2024-12-03 | Stop reason: HOSPADM

## 2024-11-29 RX ORDER — SODIUM CHLORIDE 0.9 % (FLUSH) 0.9 %
5-40 SYRINGE (ML) INJECTION EVERY 12 HOURS SCHEDULED
Status: DISCONTINUED | OUTPATIENT
Start: 2024-11-29 | End: 2024-12-03 | Stop reason: HOSPADM

## 2024-11-29 RX ORDER — POLYETHYLENE GLYCOL 3350 17 G/17G
17 POWDER, FOR SOLUTION ORAL DAILY PRN
Status: DISCONTINUED | OUTPATIENT
Start: 2024-11-29 | End: 2024-12-03 | Stop reason: HOSPADM

## 2024-11-29 RX ORDER — OLANZAPINE 5 MG/1
5 TABLET ORAL 2 TIMES DAILY
Status: DISCONTINUED | OUTPATIENT
Start: 2024-11-29 | End: 2024-12-03 | Stop reason: HOSPADM

## 2024-11-29 RX ADMIN — ENOXAPARIN SODIUM 40 MG: 100 INJECTION SUBCUTANEOUS at 22:15

## 2024-11-29 RX ADMIN — HYDROCODONE BITARTRATE AND ACETAMINOPHEN 1 TABLET: 5; 325 TABLET ORAL at 20:13

## 2024-11-29 RX ADMIN — CITALOPRAM HYDROBROMIDE 20 MG: 20 TABLET ORAL at 22:18

## 2024-11-29 RX ADMIN — ONDANSETRON 4 MG: 2 INJECTION INTRAMUSCULAR; INTRAVENOUS at 17:48

## 2024-11-29 RX ADMIN — Medication 6 MG: at 22:15

## 2024-11-29 RX ADMIN — MORPHINE SULFATE 2 MG: 2 INJECTION, SOLUTION INTRAMUSCULAR; INTRAVENOUS at 17:48

## 2024-11-29 RX ADMIN — MORPHINE SULFATE 2 MG: 2 INJECTION, SOLUTION INTRAMUSCULAR; INTRAVENOUS at 18:35

## 2024-11-29 RX ADMIN — SODIUM CHLORIDE, PRESERVATIVE FREE 10 ML: 5 INJECTION INTRAVENOUS at 22:16

## 2024-11-29 RX ADMIN — OLANZAPINE 5 MG: 5 TABLET, FILM COATED ORAL at 22:15

## 2024-11-29 RX ADMIN — DIVALPROEX SODIUM 250 MG: 125 CAPSULE, COATED PELLETS ORAL at 22:15

## 2024-11-29 ASSESSMENT — PAIN DESCRIPTION - ONSET: ONSET: ON-GOING

## 2024-11-29 ASSESSMENT — PAIN SCALES - GENERAL
PAINLEVEL_OUTOF10: 2
PAINLEVEL_OUTOF10: 6
PAINLEVEL_OUTOF10: 8
PAINLEVEL_OUTOF10: 7

## 2024-11-29 ASSESSMENT — PAIN DESCRIPTION - LOCATION: LOCATION: ARM

## 2024-11-29 ASSESSMENT — PAIN DESCRIPTION - ORIENTATION: ORIENTATION: LEFT

## 2024-11-29 ASSESSMENT — PAIN DESCRIPTION - DESCRIPTORS: DESCRIPTORS: ACHING

## 2024-11-29 ASSESSMENT — PAIN DESCRIPTION - FREQUENCY: FREQUENCY: CONTINUOUS

## 2024-11-29 ASSESSMENT — PAIN DESCRIPTION - PAIN TYPE: TYPE: ACUTE PAIN

## 2024-11-29 NOTE — ED PROVIDER NOTES
**ADVANCED PRACTICE PROVIDER, I HAVE EVALUATED THIS PATIENT**        [facility]  EMERGENCY DEPARTMENT ENCOUNTER      Pt Name: Aki Payne  MRN:[id number]  Birthdate 1940  Date of evaluation: 11/29/2024  Provider: BRITANY Yap CNP  Note Started: 6:27 PM EST 11/29/24        Chief Complaint:    Chief Complaint   Patient presents with    Fall     Patient's family member reports that pt fell \"4 days ago in garage\" causing right arm pain and then fell again \"the next day\". Wife reports that she noticed pt had swelling and bruising to left arm last night before bed. Pt has history of dementia and is at mental baseline. Pt endorses \"mild pain\" at this time. Family unsure if pt hit head when he fell.         Nursing Notes, Past Medical Hx, Past Surgical Hx, Social Hx, Allergies, and Family Hx were all reviewed and agreed with or any disagreements were addressed in the HPI.    HPI: (Location, Duration, Timing, Severity, Quality, Assoc Sx, Context, Modifying factors)    History From: Spouse  Limitations to history : Patient with history of dementia    Social Determinants Significantly Affecting Health : None    Chief Complaint-    This is a  84 y.o. male who presents to the emergency department today via private car from home for evaluation of a left upper extremity injury.  Patient sustained an unwitnessed fall on Tuesday while in the garage.  Patient had a second unwitnessed fall on Wednesday while in the bedroom.  It is unknown if there was a loss of consciousness.  Patient is not anticoagulated.  Patient does have history of dementia.  Per spouse, patient is at baseline mental status at present.  Spouse states that patient has been weak over the past few days and has been \"sleeping more than usual\".  Patient was evaluated by the primary care provider today then sent to the ED for evaluation and admission.  There has been no recent travel or known sick contacts.  There has been no vomiting or  mg IntraVENous Given 11/29/24 4408)   morphine (PF) injection 2 mg (2 mg IntraVENous Given 11/29/24 1635)       CONSULTS: (Who and What was discussed)  IP CONSULT TO ORTHOPEDIC SURGERY  IP CONSULT TO PRIMARY CARE PROVIDER  IP CONSULT TO ORTHOPEDIC SURGERY  IP CONSULT TO SOCIAL WORK      Chronic Conditions affecting care:    has a past medical history of Bladder cancer (HCC) (1992), Blood in urine (09/2022), CAD (coronary artery disease) (07/2010), Colon polyps (2006), Dementia (HCC) (05/2017), DNR (do not resuscitate), and Hyperlipidemia.     Records Reviewed (External and Source) previous records reviewed in order to gain further information regarding patient's PMH as well as his HPI.  Nursing notes reviewed.    CC/HPI Summary, DDx, ED Course, and Reassessment: This is a 84-year-old  male with history of dementia, bladder cancer, coronary artery disease, and hyperlipidemia who presents to the emergency department today via private car from home for evaluation of a left upper extremity injury.  Patient sustained an unwitnessed fall on Tuesday while in the garage.  Patient had a second unwitnessed fall on Wednesday while in the bedroom.  It is unknown if there was a loss of consciousness.  Patient is not anticoagulated.  Patient does have history of dementia.  Per spouse, patient is at baseline mental status at present.  Spouse states that patient has been weak over the past few days and has been \"sleeping more than usual\".  Patient was evaluated by the primary care provider today then sent to the ED for evaluation and admission.  There has been no recent travel or known sick contacts.  There has been no vomiting or diarrhea.  There has been no recent medication changes.  Physical exam complete.  Patient arrives nontoxic, afebrile, normotensive.  Patient arrives awake and alert.  He is able to state his name.  He attempts to follow commands.  He appears generally weak but is without any focal neurologic

## 2024-11-29 NOTE — ED NOTES
Patient with urostomy bag. Wife at bedside had extra in car. Bag was replaced. Waiting on patient to make urine.

## 2024-11-29 NOTE — ED PROVIDER NOTES
EKG Interpretation #1    Interpreted by emergency department physician  Time performed: 1656  Time read: 1703    Rhythm: Sinus  Ventricular Rate: 79  QRS Axis: -7  Ectopy: None  Conduction: Normal sinus rhythm with nonspecific ST-T wave abnormalities.  ST Segments: Nonspecific abnormalities  T Waves: Nonspecific abnormalities  Q Waves: None noted    Other findings: Motion artifact making it difficult to read EKG    Compared to EKG on: 4/14/2020 and appears unchanged except he had sinus tachycardia at that time    Clinical Impression: Normal sinus rhythm with nonspecific ST with T wave abnormalities.  There is motion artifact making it difficult to read EKG.  This is compared to an EKG on 4/14/2020 appears unchanged except he had sinus tachycardia at that time.    DO Galina RAMOS Charles K, DO  11/29/24 1701

## 2024-11-30 PROBLEM — G93.41 ACUTE METABOLIC ENCEPHALOPATHY: Status: ACTIVE | Noted: 2024-11-30

## 2024-11-30 PROBLEM — R29.6 UNWITNESSED FALL: Status: ACTIVE | Noted: 2024-11-30

## 2024-11-30 PROBLEM — R79.89 ELEVATED TROPONIN: Status: ACTIVE | Noted: 2024-11-30

## 2024-11-30 PROBLEM — S42.212A CLOSED DISPLACED FRACTURE OF SURGICAL NECK OF LEFT HUMERUS: Status: ACTIVE | Noted: 2024-11-30

## 2024-11-30 PROBLEM — R53.1 GENERAL WEAKNESS: Status: ACTIVE | Noted: 2024-11-30

## 2024-11-30 PROBLEM — R94.31 ST SEGMENT DEPRESSION: Status: ACTIVE | Noted: 2024-11-30

## 2024-11-30 PROBLEM — Z86.59 HISTORY OF DEMENTIA: Status: ACTIVE | Noted: 2024-11-30

## 2024-11-30 PROBLEM — R53.81 PHYSICAL DEBILITY: Status: ACTIVE | Noted: 2024-11-30

## 2024-11-30 PROBLEM — R55 SYNCOPE: Status: ACTIVE | Noted: 2024-11-30

## 2024-11-30 LAB
ALBUMIN SERPL-MCNC: 3.2 G/DL (ref 3.4–5)
ALP SERPL-CCNC: 53 U/L (ref 40–129)
ALT SERPL-CCNC: 27 U/L (ref 10–40)
AMMONIA PLAS-SCNC: 37 UMOL/L (ref 16–60)
ANION GAP SERPL CALCULATED.3IONS-SCNC: 7 MMOL/L (ref 3–16)
AST SERPL-CCNC: 31 U/L (ref 15–37)
BACTERIA URNS QL MICRO: ABNORMAL /HPF
BASE EXCESS BLDV CALC-SCNC: 4.7 MMOL/L
BASOPHILS # BLD: 0 K/UL (ref 0–0.2)
BASOPHILS NFR BLD: 0.6 %
BILIRUB DIRECT SERPL-MCNC: 0.3 MG/DL (ref 0–0.3)
BILIRUB INDIRECT SERPL-MCNC: 0.4 MG/DL (ref 0–1)
BILIRUB SERPL-MCNC: 0.7 MG/DL (ref 0–1)
BILIRUB UR QL STRIP.AUTO: NEGATIVE
BUN SERPL-MCNC: 29 MG/DL (ref 7–20)
CALCIUM SERPL-MCNC: 10 MG/DL (ref 8.3–10.6)
CHLORIDE SERPL-SCNC: 108 MMOL/L (ref 99–110)
CLARITY UR: ABNORMAL
CO2 BLDV-SCNC: 33 MMOL/L
CO2 SERPL-SCNC: 26 MMOL/L (ref 21–32)
COHGB MFR BLDV: 2.3 %
COLOR UR: YELLOW
CREAT SERPL-MCNC: 0.9 MG/DL (ref 0.8–1.3)
DEPRECATED RDW RBC AUTO: 13.8 % (ref 12.4–15.4)
EKG ATRIAL RATE: 79 BPM
EKG DIAGNOSIS: NORMAL
EKG P AXIS: 65 DEGREES
EKG P-R INTERVAL: 172 MS
EKG Q-T INTERVAL: 368 MS
EKG QRS DURATION: 100 MS
EKG QTC CALCULATION (BAZETT): 421 MS
EKG R AXIS: -7 DEGREES
EKG T AXIS: 76 DEGREES
EKG VENTRICULAR RATE: 79 BPM
EOSINOPHIL # BLD: 0.3 K/UL (ref 0–0.6)
EOSINOPHIL NFR BLD: 4.2 %
EPI CELLS #/AREA URNS AUTO: 5 /HPF (ref 0–5)
GFR SERPLBLD CREATININE-BSD FMLA CKD-EPI: 85 ML/MIN/{1.73_M2}
GLUCOSE SERPL-MCNC: 148 MG/DL (ref 70–99)
GLUCOSE UR STRIP.AUTO-MCNC: NEGATIVE MG/DL
HCO3 BLDV-SCNC: 31 MMOL/L (ref 23–29)
HCT VFR BLD AUTO: 32.7 % (ref 40.5–52.5)
HGB BLD-MCNC: 10.9 G/DL (ref 13.5–17.5)
HGB UR QL STRIP.AUTO: ABNORMAL
HYALINE CASTS #/AREA URNS AUTO: 7 /LPF (ref 0–8)
KETONES UR STRIP.AUTO-MCNC: NEGATIVE MG/DL
LACTATE BLDV-SCNC: 1.2 MMOL/L (ref 0.4–2)
LEUKOCYTE ESTERASE UR QL STRIP.AUTO: ABNORMAL
LYMPHOCYTES # BLD: 1.5 K/UL (ref 1–5.1)
LYMPHOCYTES NFR BLD: 24.4 %
MCH RBC QN AUTO: 28.9 PG (ref 26–34)
MCHC RBC AUTO-ENTMCNC: 33.3 G/DL (ref 31–36)
MCV RBC AUTO: 86.8 FL (ref 80–100)
METHGB MFR BLDV: 0.5 %
MONOCYTES # BLD: 0.6 K/UL (ref 0–1.3)
MONOCYTES NFR BLD: 9 %
NEUTROPHILS # BLD: 3.8 K/UL (ref 1.7–7.7)
NEUTROPHILS NFR BLD: 61.8 %
NITRITE UR QL STRIP.AUTO: POSITIVE
O2 THERAPY: ABNORMAL
PCO2 BLDV: 54.7 MMHG (ref 40–50)
PH BLDV: 7.37 [PH] (ref 7.35–7.45)
PH UR STRIP.AUTO: 8.5 [PH] (ref 5–8)
PLATELET # BLD AUTO: 205 K/UL (ref 135–450)
PMV BLD AUTO: 8.1 FL (ref 5–10.5)
PO2 BLDV: <30 MMHG
POTASSIUM SERPL-SCNC: 4.1 MMOL/L (ref 3.5–5.1)
PROT SERPL-MCNC: 5.9 G/DL (ref 6.4–8.2)
PROT UR STRIP.AUTO-MCNC: 100 MG/DL
RBC # BLD AUTO: 3.76 M/UL (ref 4.2–5.9)
RBC CLUMPS #/AREA URNS AUTO: 37 /HPF (ref 0–4)
SAO2 % BLDV: 52 %
SODIUM SERPL-SCNC: 141 MMOL/L (ref 136–145)
SP GR UR STRIP.AUTO: 1.02 (ref 1–1.03)
TRIPLE PHOSPHATE CRYSTALS: PRESENT
UA COMPLETE W REFLEX CULTURE PNL UR: YES
UA DIPSTICK W REFLEX MICRO PNL UR: YES
URN SPEC COLLECT METH UR: ABNORMAL
UROBILINOGEN UR STRIP-ACNC: 1 E.U./DL
WBC # BLD AUTO: 6.1 K/UL (ref 4–11)
WBC #/AREA URNS AUTO: 26 /HPF (ref 0–5)

## 2024-11-30 PROCEDURE — 94760 N-INVAS EAR/PLS OXIMETRY 1: CPT

## 2024-11-30 PROCEDURE — 85025 COMPLETE CBC W/AUTO DIFF WBC: CPT

## 2024-11-30 PROCEDURE — 36415 COLL VENOUS BLD VENIPUNCTURE: CPT

## 2024-11-30 PROCEDURE — 83605 ASSAY OF LACTIC ACID: CPT

## 2024-11-30 PROCEDURE — 6360000002 HC RX W HCPCS: Performed by: INTERNAL MEDICINE

## 2024-11-30 PROCEDURE — 6370000000 HC RX 637 (ALT 250 FOR IP): Performed by: INTERNAL MEDICINE

## 2024-11-30 PROCEDURE — 87086 URINE CULTURE/COLONY COUNT: CPT

## 2024-11-30 PROCEDURE — 2580000003 HC RX 258: Performed by: INTERNAL MEDICINE

## 2024-11-30 PROCEDURE — 99223 1ST HOSP IP/OBS HIGH 75: CPT | Performed by: ORTHOPAEDIC SURGERY

## 2024-11-30 PROCEDURE — 80048 BASIC METABOLIC PNL TOTAL CA: CPT

## 2024-11-30 PROCEDURE — 82140 ASSAY OF AMMONIA: CPT

## 2024-11-30 PROCEDURE — 1200000000 HC SEMI PRIVATE

## 2024-11-30 PROCEDURE — 9990000010 HC NO CHARGE VISIT

## 2024-11-30 PROCEDURE — 80165 DIPROPYLACETIC ACID FREE: CPT

## 2024-11-30 PROCEDURE — 87077 CULTURE AEROBIC IDENTIFY: CPT

## 2024-11-30 PROCEDURE — 82803 BLOOD GASES ANY COMBINATION: CPT

## 2024-11-30 PROCEDURE — 87186 SC STD MICRODIL/AGAR DIL: CPT

## 2024-11-30 PROCEDURE — 80164 ASSAY DIPROPYLACETIC ACD TOT: CPT

## 2024-11-30 PROCEDURE — 80076 HEPATIC FUNCTION PANEL: CPT

## 2024-11-30 PROCEDURE — 81001 URINALYSIS AUTO W/SCOPE: CPT

## 2024-11-30 PROCEDURE — 93010 ELECTROCARDIOGRAM REPORT: CPT | Performed by: INTERNAL MEDICINE

## 2024-11-30 PROCEDURE — 99223 1ST HOSP IP/OBS HIGH 75: CPT | Performed by: INTERNAL MEDICINE

## 2024-11-30 RX ADMIN — ACETAMINOPHEN 1000 MG: 500 TABLET ORAL at 17:18

## 2024-11-30 RX ADMIN — MORPHINE SULFATE 2 MG: 2 INJECTION, SOLUTION INTRAMUSCULAR; INTRAVENOUS at 05:47

## 2024-11-30 RX ADMIN — OLANZAPINE 5 MG: 5 TABLET, FILM COATED ORAL at 21:42

## 2024-11-30 RX ADMIN — SODIUM CHLORIDE, PRESERVATIVE FREE 10 ML: 5 INJECTION INTRAVENOUS at 21:42

## 2024-11-30 RX ADMIN — OLANZAPINE 5 MG: 5 TABLET, FILM COATED ORAL at 07:52

## 2024-11-30 RX ADMIN — Medication 6 MG: at 21:41

## 2024-11-30 RX ADMIN — WATER 1000 MG: 1 INJECTION INTRAMUSCULAR; INTRAVENOUS; SUBCUTANEOUS at 05:47

## 2024-11-30 RX ADMIN — DIVALPROEX SODIUM 250 MG: 125 CAPSULE, COATED PELLETS ORAL at 07:52

## 2024-11-30 RX ADMIN — DIVALPROEX SODIUM 250 MG: 125 CAPSULE, COATED PELLETS ORAL at 21:40

## 2024-11-30 RX ADMIN — CITALOPRAM HYDROBROMIDE 20 MG: 20 TABLET ORAL at 07:51

## 2024-11-30 RX ADMIN — ENOXAPARIN SODIUM 40 MG: 100 INJECTION SUBCUTANEOUS at 21:40

## 2024-11-30 ASSESSMENT — PAIN SCALES - GENERAL
PAINLEVEL_OUTOF10: 0
PAINLEVEL_OUTOF10: 0

## 2024-11-30 ASSESSMENT — PAIN DESCRIPTION - LOCATION: LOCATION: ARM

## 2024-11-30 ASSESSMENT — PAIN DESCRIPTION - ORIENTATION: ORIENTATION: LEFT

## 2024-11-30 ASSESSMENT — PAIN DESCRIPTION - DESCRIPTORS: DESCRIPTORS: PATIENT UNABLE TO DESCRIBE

## 2024-11-30 NOTE — CONSULTS
REVIEW OF SYSTEMS:   CONSTITUTIONAL: Denies unexplained weight loss, fevers, chills or fatigue  NEUROLOGICAL: Denies unsteady gait or progressive weakness    PSYCHOLOGICAL: Denies anxiety, depression   SKIN: Denies skin changes, delayed healing, rash, itching   HEMATOLOGIC: Denies easy bleeding or bruising  ENDOCRINE: Denies excessive thirst, urination, heat/cold  RESPIRATORY: Denies current dyspnea, cough  CARDIOVASCULAR: Negative for chest pain at this time.  EYES: Negative for photophobia and visual disturbance.   ENT:  Negative for rhinorrhea, epistaxis, sore throat, or hearing loss.  GI: Denies nausea, vomiting, diarrhea   : Denies bowel or bladder issues   MUSCULOSKELETAL: Left shoulder pain.  All other ROS reviewed in chart or with patient or family and are grossly negative.       PHYSICAL EXAMINATION:  Mr. Payne is a very pleasant 84 y.o. male who seen today in no acute distress, awake, alert, but disoriented.  He is well nourished and groomed.  Patient with normal affect. Body mass index is 30.41 kg/m².. Skin warm and dry. Resting respiratory rate is 16.  Resp deep and easy. Pulse is with regular rate and rhythm    BP (!) 149/87   Pulse 74   Temp 97.6 °F (36.4 °C) (Axillary)   Resp 16   Ht 1.753 m (5' 9\")   Wt 93.4 kg (205 lb 14.6 oz)   SpO2 92%   BMI 30.41 kg/m²        Airway is intact  Chest: chest clear, no wheezing, rales, normal symmetric air entry, no tachypnea, retractions or cyanosis  Heart: regular rate and rhythm ; heart sounds normal   Hearing intact, pupil equal and reactive bilateral  Lymphatics; No groin or axillary enlarged lymph nodes.  Neck; No swelling  Abdomen; soft, non distended.     MUSCULOSKELETAL: On evaluation of his bilateral upper extremity, there is no obvious deformity left shoulder.  There is moderate swelling and moderate ecchymosis.  He is tender to palpation over the shoulder, and otherwise non tender over the remainder of the extremity.  Range of motion is

## 2024-11-30 NOTE — PROGRESS NOTES
4 Eyes Skin Assessment     NAME:  Aki Payne  YOB: 1940  MEDICAL RECORD NUMBER:  [id number]    The patient is being assessed for  Admission    I agree that at least one RN has performed a thorough Head to Toe Skin Assessment on the patient. ALL assessment sites listed below have been assessed.      Areas assessed by both nurses:    {Pressure Areas Assessed:95395}        Does the Patient have a Wound? No noted wound(s)       Lambert Prevention initiated by RN: No  Wound Care Orders initiated by RN: No    Pressure Injury (Stage 3,4, Unstageable, DTI, NWPT, and Complex wounds) if present, place Wound referral order by RN under : No    New Ostomies, if present place, Ostomy referral order under : No     Nurse 1 eSignature: Electronically signed by Kassandra Ford RN on 11/30/24 at 4:16 AM EST    **SHARE this note so that the co-signing nurse can place an eSignature**    Nurse 2 eSignature: {Esignature:[id number]}

## 2024-11-30 NOTE — PROGRESS NOTES
Therapy was unable to get patient up due to drowsiness. This RN will attempt again as appropriate to get orthostatic Bp.  Electronically signed by LINDSAY GONZALEZ RN on 11/30/2024 at 11:44 AM;

## 2024-11-30 NOTE — PROGRESS NOTES
Pt arrived to room 3131 from ed.wife at bedside answering admission questions.  Vital signs taken and were WNL.  Admission and assessment completed. Pt is confused, tele monitor ordered, Charge nurse made aware.pt not easily redirected,attempting to get out of bed. Very hard of hearing. Pt has 8 year hx of dementia sitter place in room. Pt has abrasions on left lower and right lower leg. Left humerus bruised and wife reports he fell twice. Fall precautions in place. Call light, bedside table and phone within easy reach. Pt instructed to use call light to call for assistance.

## 2024-11-30 NOTE — PROGRESS NOTES
Medication Reconciliation    List of medications patient is currently taking is complete.     Source of information: 1. Conversation with patient's caregiver at bedside                                      2. EPIC records           Notes regarding home medications:   Per caregiver, patient didn't receive his AM dose of medications PTA.         Hamida Wong, Pharmacy Intern   11/29/2024  7:19 PM

## 2024-11-30 NOTE — PROGRESS NOTES
Patient up in bed and alert to self. Patient resting comfortably with sitter at bedside for safety. Head to toe assessment completed. Patient denies any pain at the moment. LUE remains in dling. Wife at bedside Fall precautions in place.  Electronically signed by LINDSAY GONZALEZ RN on 11/30/2024 at 10:57 AM

## 2024-11-30 NOTE — PLAN OF CARE
Problem: Discharge Planning  Goal: Discharge to home or other facility with appropriate resources  11/30/2024 0925 by Cristiano Gonzaelz RN  Outcome: Progressing  11/30/2024 0253 by Kassandra Ford RN  Outcome: Progressing     Problem: Pain  Goal: Verbalizes/displays adequate comfort level or baseline comfort level  11/30/2024 0925 by Cristiano Gonzalez RN  Outcome: Progressing  11/30/2024 0253 by Kassandra Ford RN  Outcome: Progressing     Problem: Safety - Adult  Goal: Free from fall injury  11/30/2024 0925 by Cristiano Gonzalez RN  Outcome: Progressing  11/30/2024 0253 by Kassandra Ford RN  Outcome: Progressing     Problem: ABCDS Injury Assessment  Goal: Absence of physical injury  11/30/2024 0925 by Cristiano Gonzalez RN  Outcome: Progressing  11/30/2024 0253 by Kassandra Ford RN  Outcome: Progressing   Electronically signed by CRISTIANO GONZALEZ RN on 11/30/2024 at 9:25 AM

## 2024-11-30 NOTE — PROGRESS NOTES
Physical Therapy      Aki Payne  11/30/2024    -chart reviewed  -discussed patient PLOF with his wife   -he is lethargic and not able to engage at this time   -decision for ORIF pending     Electronically signed by STEFANI GEORGE PT on 11/30/2024 at 11:45 AM

## 2024-11-30 NOTE — PROGRESS NOTES
Occupational Therapy  Defer OT eval secondary to level of alertness and awaiting Ortho input on sx vs no sx.  Discussed with wife, pt was independent with mobility without device PTA, has UTI and wonders if that caused the two subsequent falls resulting in humeral fx.  Will assess Monday as able. Amy Carter, OTR/L #[id number] 11/30/2024 11:44 AM

## 2024-11-30 NOTE — H&P
chloride infusion, , IntraVENous, PRN, Lionel, Yohan M, DO    enoxaparin (LOVENOX) injection 40 mg, 40 mg, SubCUTAneous, Nightly, Lionel, Yohan M, DO, 40 mg at 11/29/24 2215    ondansetron (ZOFRAN-ODT) disintegrating tablet 4 mg, 4 mg, Oral, Q8H PRN **OR** ondansetron (ZOFRAN) injection 4 mg, 4 mg, IntraVENous, Q6H PRN, Lionel, Yohan M, DO    polyethylene glycol (GLYCOLAX) packet 17 g, 17 g, Oral, Daily PRN, Lionel, Yohan M, DO    acetaminophen (TYLENOL) tablet 1,000 mg, 1,000 mg, Oral, Q8H PRN, Lionel, Yohan M, DO    HYDROcodone-acetaminophen (NORCO) 5-325 MG per tablet 1 tablet, 1 tablet, Oral, Q6H PRN, Lionel, Yohan M, DO, 1 tablet at 11/29/24 2013    morphine (PF) injection 2 mg, 2 mg, IntraVENous, Q6H PRN, Lionel, Yohan M, DO, 2 mg at 11/30/24 0547    melatonin tablet 6 mg, 6 mg, Oral, Nightly, Lionel, Yohan M, DO, 6 mg at 11/29/24 2215    citalopram (CELEXA) tablet 20 mg, 20 mg, Oral, Daily, Lionel, Yohan M, DO, 20 mg at 11/30/24 0751    divalproex (DEPAKOTE SPRINKLE) DR capsule 250 mg, 250 mg, Oral, BID, Lionel, Yohan M, DO, 250 mg at 11/30/24 0752    OLANZapine (ZYPREXA) tablet 5 mg, 5 mg, Oral, BID, Lionel, Yohan M, DO, 5 mg at 11/30/24 0752  OBJECTIVE     Vitals:  Vitals:    11/30/24 0719   BP: (!) 149/87   Pulse: 74   Resp: 16   Temp: 97.6 °F (36.4 °C)   SpO2: 92%     Physical exam:  Constitutional: pt somnolent and OX0, no apparent distress, slightly opens eyes to sternal rub and moans  Lungs: CTA BL  Cardio: RRR, S1S2, no rubs, murmurs, or gallops, 2+ BL radial and 1+ BL dorsalis pedis pulses, trace BL LE edema (wife endorses chronic)  Abdominal: normoactive BS, no tenderness to palpation  MSK: edema as above, LUE in sling, sig bruising throughout LUE and mild swelling  Skin: no rash  Neuro: grossly intact, somnolent and OX0, does not follow commands, withdraws to pain in all 4 extremities    ASSESSMENT AND PLAN      Aki Chulydia is a 84 y.o. male with a history of bladder cx, CAD, dementia, and

## 2024-12-01 PROBLEM — Z93.9 HISTORY OF CREATION OF OSTOMY (HCC): Status: ACTIVE | Noted: 2024-12-01

## 2024-12-01 PROBLEM — Z90.6 H/O TOTAL CYSTECTOMY: Status: ACTIVE | Noted: 2024-12-01

## 2024-12-01 LAB
ALBUMIN SERPL-MCNC: 3.1 G/DL (ref 3.4–5)
ALP SERPL-CCNC: 56 U/L (ref 40–129)
ALT SERPL-CCNC: 30 U/L (ref 10–40)
ANION GAP SERPL CALCULATED.3IONS-SCNC: 7 MMOL/L (ref 3–16)
AST SERPL-CCNC: 32 U/L (ref 15–37)
BASOPHILS # BLD: 0 K/UL (ref 0–0.2)
BASOPHILS NFR BLD: 0.8 %
BILIRUB DIRECT SERPL-MCNC: 0.3 MG/DL (ref 0–0.3)
BILIRUB INDIRECT SERPL-MCNC: 0.3 MG/DL (ref 0–1)
BILIRUB SERPL-MCNC: 0.6 MG/DL (ref 0–1)
BUN SERPL-MCNC: 27 MG/DL (ref 7–20)
CALCIUM SERPL-MCNC: 10 MG/DL (ref 8.3–10.6)
CHLORIDE SERPL-SCNC: 107 MMOL/L (ref 99–110)
CO2 SERPL-SCNC: 28 MMOL/L (ref 21–32)
CREAT SERPL-MCNC: 0.8 MG/DL (ref 0.8–1.3)
DEPRECATED RDW RBC AUTO: 13.7 % (ref 12.4–15.4)
EOSINOPHIL # BLD: 0.4 K/UL (ref 0–0.6)
EOSINOPHIL NFR BLD: 6 %
GFR SERPLBLD CREATININE-BSD FMLA CKD-EPI: 88 ML/MIN/{1.73_M2}
GLUCOSE SERPL-MCNC: 107 MG/DL (ref 70–99)
HCT VFR BLD AUTO: 35.6 % (ref 40.5–52.5)
HGB BLD-MCNC: 11.6 G/DL (ref 13.5–17.5)
LYMPHOCYTES # BLD: 1.4 K/UL (ref 1–5.1)
LYMPHOCYTES NFR BLD: 23.2 %
MCH RBC QN AUTO: 28.4 PG (ref 26–34)
MCHC RBC AUTO-ENTMCNC: 32.7 G/DL (ref 31–36)
MCV RBC AUTO: 86.9 FL (ref 80–100)
MONOCYTES # BLD: 0.5 K/UL (ref 0–1.3)
MONOCYTES NFR BLD: 7.8 %
NEUTROPHILS # BLD: 3.9 K/UL (ref 1.7–7.7)
NEUTROPHILS NFR BLD: 62.2 %
PLATELET # BLD AUTO: 228 K/UL (ref 135–450)
PMV BLD AUTO: 7.6 FL (ref 5–10.5)
POTASSIUM SERPL-SCNC: 4.2 MMOL/L (ref 3.5–5.1)
PROT SERPL-MCNC: 5.7 G/DL (ref 6.4–8.2)
RBC # BLD AUTO: 4.1 M/UL (ref 4.2–5.9)
REASON FOR REJECTION: NORMAL
REJECTED TEST: NORMAL
SODIUM SERPL-SCNC: 142 MMOL/L (ref 136–145)
WBC # BLD AUTO: 6.2 K/UL (ref 4–11)

## 2024-12-01 PROCEDURE — 99233 SBSQ HOSP IP/OBS HIGH 50: CPT | Performed by: ORTHOPAEDIC SURGERY

## 2024-12-01 PROCEDURE — 9990000010 HC NO CHARGE VISIT

## 2024-12-01 PROCEDURE — 99232 SBSQ HOSP IP/OBS MODERATE 35: CPT | Performed by: INTERNAL MEDICINE

## 2024-12-01 PROCEDURE — 2580000003 HC RX 258: Performed by: INTERNAL MEDICINE

## 2024-12-01 PROCEDURE — 80076 HEPATIC FUNCTION PANEL: CPT

## 2024-12-01 PROCEDURE — 94760 N-INVAS EAR/PLS OXIMETRY 1: CPT

## 2024-12-01 PROCEDURE — 6360000002 HC RX W HCPCS: Performed by: INTERNAL MEDICINE

## 2024-12-01 PROCEDURE — 36415 COLL VENOUS BLD VENIPUNCTURE: CPT

## 2024-12-01 PROCEDURE — 6370000000 HC RX 637 (ALT 250 FOR IP): Performed by: INTERNAL MEDICINE

## 2024-12-01 PROCEDURE — 85025 COMPLETE CBC W/AUTO DIFF WBC: CPT

## 2024-12-01 PROCEDURE — 80048 BASIC METABOLIC PNL TOTAL CA: CPT

## 2024-12-01 PROCEDURE — 1200000000 HC SEMI PRIVATE

## 2024-12-01 RX ADMIN — DIVALPROEX SODIUM 250 MG: 125 CAPSULE, COATED PELLETS ORAL at 19:59

## 2024-12-01 RX ADMIN — SODIUM CHLORIDE, PRESERVATIVE FREE 10 ML: 5 INJECTION INTRAVENOUS at 19:59

## 2024-12-01 RX ADMIN — CITALOPRAM HYDROBROMIDE 20 MG: 20 TABLET ORAL at 09:48

## 2024-12-01 RX ADMIN — SODIUM CHLORIDE, PRESERVATIVE FREE 10 ML: 5 INJECTION INTRAVENOUS at 09:48

## 2024-12-01 RX ADMIN — MORPHINE SULFATE 2 MG: 2 INJECTION, SOLUTION INTRAMUSCULAR; INTRAVENOUS at 18:54

## 2024-12-01 RX ADMIN — OLANZAPINE 5 MG: 5 TABLET, FILM COATED ORAL at 19:59

## 2024-12-01 RX ADMIN — WATER 1000 MG: 1 INJECTION INTRAMUSCULAR; INTRAVENOUS; SUBCUTANEOUS at 05:08

## 2024-12-01 RX ADMIN — DIVALPROEX SODIUM 250 MG: 125 CAPSULE, COATED PELLETS ORAL at 09:48

## 2024-12-01 RX ADMIN — ENOXAPARIN SODIUM 40 MG: 100 INJECTION SUBCUTANEOUS at 19:59

## 2024-12-01 RX ADMIN — Medication 6 MG: at 19:59

## 2024-12-01 RX ADMIN — HYDROCODONE BITARTRATE AND ACETAMINOPHEN 1 TABLET: 5; 325 TABLET ORAL at 12:45

## 2024-12-01 RX ADMIN — MORPHINE SULFATE 2 MG: 2 INJECTION, SOLUTION INTRAMUSCULAR; INTRAVENOUS at 05:08

## 2024-12-01 RX ADMIN — OLANZAPINE 5 MG: 5 TABLET, FILM COATED ORAL at 09:48

## 2024-12-01 ASSESSMENT — PAIN DESCRIPTION - ORIENTATION: ORIENTATION: LEFT

## 2024-12-01 ASSESSMENT — PAIN SCALES - GENERAL
PAINLEVEL_OUTOF10: 3
PAINLEVEL_OUTOF10: 6

## 2024-12-01 ASSESSMENT — PAIN SCALES - PAIN ASSESSMENT IN ADVANCED DEMENTIA (PAINAD)
CONSOLABILITY: DISTRACTED OR REASSURED BY VOICE/TOUCH
BREATHING: OCCASIONAL LABORED BREATHING, SHORT PERIOD OF HYPERVENTILATION
FACIALEXPRESSION: SMILING OR INEXPRESSIVE
NEGVOCALIZATION: REPEATED TROUBLED CALLING OUT, LOUD MOANING/GROANING, CRYING
BODYLANGUAGE: RELAXED
FACIALEXPRESSION: SAD, FRIGHTENED, FROWN
BODYLANGUAGE: TENSE, DISTRESSED PACING, FIDGETING
TOTALSCORE: 0
CONSOLABILITY: NO NEED TO CONSOLE
BREATHING: NORMAL
TOTALSCORE: 6

## 2024-12-01 ASSESSMENT — PAIN DESCRIPTION - DESCRIPTORS: DESCRIPTORS: ACHING

## 2024-12-01 ASSESSMENT — PAIN DESCRIPTION - LOCATION: LOCATION: ARM

## 2024-12-01 ASSESSMENT — PAIN - FUNCTIONAL ASSESSMENT: PAIN_FUNCTIONAL_ASSESSMENT: PREVENTS OR INTERFERES SOME ACTIVE ACTIVITIES AND ADLS

## 2024-12-01 NOTE — PROGRESS NOTES
This RN and sitter attempted to obtain orthostatic vitals, but it was very challenging as pt did not want to sit still and moved arm during inflation of BP cuff. We did notice that BP did not drop from sitting to standing position and heart rate remained stable. Pt was very agitated prior to getting up and moving around and continues to mumble to himself and take his arm out of the sling. This RN to give iv morphine to help manage pt's pain.

## 2024-12-01 NOTE — PROGRESS NOTES
. Patient in bed resting in bed at this time.Patient is  alert and oriented x 1. unAble to make all needs known. PT denies pain , He attempted to spit meds out but was able to take them when redirected. Sitter still present he has tried to get out of bed, will not keep arm brace on, pulling gown, very restless. Assessment completed. VS WNL.. IV capped and flushed. Fall precautions in place. Call light within reach.

## 2024-12-01 NOTE — PROGRESS NOTES
[facility] Orthopedic Surgery  Progress Note         CHIEF COMPLAINT:  Left shoulder pain/ moderately displaced comminuted proximal humerus fracture.    History Obtained From:  patient, wife, family member - niece, electronic medical record    HISTORY OF PRESENT ILLNESS:   Mr. Payne is a 84 y.o.  male right handed with dementia, lives at home with his wife  who seen today at [facility] for f/u evaluation of a left shoulder injury.  The patient reports that this injury occurred when fell few days ago in the garage, wife noticed ecchymosis left arm.  He was first seen and evaluated in [facility], when he was x-rayed and I was consulted. The patient denies any other injuries.  Alleviating factors rest. Movement makes the pain worse, the sling and resting makes the pain better.  No numbness or tingling sensation.      Past Medical History:        Diagnosis Date    Bladder cancer (Formerly [facility]) 1992    Dr Carrasquillo    Blood in urine 09/2022    CAD (coronary artery disease) 07/2010    cardiac cath- non obstructive    Colon polyps 2006    by colonoscopy    Dementia (Formerly [facility]) 05/2017    DNR (do not resuscitate)     Hyperlipidemia        Past Surgical History:        Procedure Laterality Date    BLADDER REMOVAL  1992    with ileal conduit    COLONOSCOPY  12/2010    Due in 2015    COLONOSCOPY  10/05/2016    [facility]    CYSTOSCOPY N/A 10/4/2022    ENDOSCOPIC EXPLORATION OF THE ILEAL CONDUIT WITH BIOPSY AND FULGERATION performed by Joseph Elkins MD at [facility] OR    OTHER SURGICAL HISTORY  6/2013, 8/2014    Ileal conduit dilatation (Brittni)    TIBIA FRACTURE SURGERY Right 1980       Medications prior to admission:   Prior to Admission medications    Medication Sig Start Date End Date Taking? Authorizing Provider   divalproex (DEPAKOTE SPRINKLE) 125 MG capsule Take 2 capsules by mouth 2 times daily 7/13/22  Yes ProviderSolo MD   OLANZapine (ZYPREXA) 5 MG tablet Take 1 tablet by mouth 2 times daily 3/11/22  Yes ProviderSolo MD  depression   SKIN: Denies skin changes, delayed healing, rash, itching   HEMATOLOGIC: Denies easy bleeding or bruising  ENDOCRINE: Denies excessive thirst, urination, heat/cold  RESPIRATORY: Denies current dyspnea, cough  CARDIOVASCULAR: Negative for chest pain at this time.  EYES: Negative for photophobia and visual disturbance.   ENT:  Negative for rhinorrhea, epistaxis, sore throat, or hearing loss.  GI: Denies nausea, vomiting, diarrhea   : Denies bowel or bladder issues   MUSCULOSKELETAL: Left shoulder pain.  All other ROS reviewed in chart or with patient or family and are grossly negative.       PHYSICAL EXAMINATION:  Mr. Payne is a very pleasant 84 y.o. male who seen today in no acute distress, awake, alert, but disoriented.  He is well nourished and groomed.  Patient with normal affect. Body mass index is 30.64 kg/m².. Skin warm and dry. Resting respiratory rate is 16.  Resp deep and easy. Pulse is with regular rate and rhythm    BP (!) 145/92   Pulse 75   Temp 98.1 °F (36.7 °C) (Oral)   Resp 18   Ht 1.753 m (5' 9\")   Wt 94.1 kg (207 lb 7.3 oz)   SpO2 92%   BMI 30.64 kg/m²        Airway is intact  Chest: chest clear, no wheezing, rales, normal symmetric air entry, no tachypnea, retractions or cyanosis  Heart: regular rate and rhythm ; heart sounds normal   Hearing intact, pupil equal and reactive bilateral  Lymphatics; No groin or axillary enlarged lymph nodes.  Neck; No swelling  Abdomen; soft, non distended.     MUSCULOSKELETAL: On evaluation of his bilateral upper extremity, there is no obvious deformity left shoulder.  There is moderate swelling and moderate ecchymosis.  He is tender to palpation over the shoulder, and otherwise non tender over the remainder of the extremity.  Range of motion is decreased secondary to pain over the left shoulder.  The skin overlying the left shoulder is intact without evidence of lesion, laceration.  Distal pulses are 2+ and symmetric bilaterally.  Sensation

## 2024-12-01 NOTE — PROGRESS NOTES
Physical Therapy  PT referral received and chart reviewed.  Spoke with nursing this morning; unsure if/when Surg Intervention will occur.  Pt with sitter; unable to follow simple commands and attempt to remove UE sling/get oob.  Advised to hold PT Eval today.  Will follow-up tomorrow as approp.  Lavern Au, PT

## 2024-12-01 NOTE — PROGRESS NOTES
IntraVENous, PRN, Lionel, Yohan M, DO    enoxaparin (LOVENOX) injection 40 mg, 40 mg, SubCUTAneous, Nightly, Lionel, Yohan M, DO, 40 mg at 11/30/24 2140    ondansetron (ZOFRAN-ODT) disintegrating tablet 4 mg, 4 mg, Oral, Q8H PRN **OR** ondansetron (ZOFRAN) injection 4 mg, 4 mg, IntraVENous, Q6H PRN, Lionel, Yohan M, DO    polyethylene glycol (GLYCOLAX) packet 17 g, 17 g, Oral, Daily PRN, Lionel, Yohan M, DO    acetaminophen (TYLENOL) tablet 1,000 mg, 1,000 mg, Oral, Q8H PRN, Lionel, Yohan M, DO, 1,000 mg at 11/30/24 1718    HYDROcodone-acetaminophen (NORCO) 5-325 MG per tablet 1 tablet, 1 tablet, Oral, Q6H PRN, Lionel, Yohan M, DO, 1 tablet at 11/29/24 2013    morphine (PF) injection 2 mg, 2 mg, IntraVENous, Q6H PRN, Lionel, Yohan M, DO, 2 mg at 12/01/24 0508    melatonin tablet 6 mg, 6 mg, Oral, Nightly, Lionel, Yohan M, DO, 6 mg at 11/30/24 2141    citalopram (CELEXA) tablet 20 mg, 20 mg, Oral, Daily, Lionel, Yohan M, DO, 20 mg at 12/01/24 0948    divalproex (DEPAKOTE SPRINKLE) DR capsule 250 mg, 250 mg, Oral, BID, Lionel, Yohan M, DO, 250 mg at 12/01/24 0948    OLANZapine (ZYPREXA) tablet 5 mg, 5 mg, Oral, BID, Lionel, Yohan M, DO, 5 mg at 12/01/24 0948  OBJECTIVE     Vitals:  Vitals:    12/01/24 0508 12/01/24 0843 12/01/24 0849 12/01/24 1115   BP:  131/80  (!) 145/92   Pulse:  65  75   Resp: 16 16 16 17   Temp:  97.5 °F (36.4 °C)  98.1 °F (36.7 °C)   TempSrc:  Axillary  Oral   SpO2:  93% 92% 92%   Weight:       Height:         Physical exam:  Constitutional: pt A&OX1, no apparent distress  Lungs: CTA BL  Cardio: RRR, S1S2, no rubs, murmurs, or gallops, 2+ BL radial and 1+ BL dorsalis pedis pulses, trace BL LE edema (wife endorses chronic)  Abdominal: normoactive BS, no tenderness to palpation, RLQ urine ostomy in place w/dark yellow urine  MSK: edema as above, LUE in sling, sig bruising throughout LUE and mild swelling  Skin: no rash  Neuro: grossly intact, A&OX1, moves all 4 extremities freely    ASSESSMENT AND

## 2024-12-02 ENCOUNTER — APPOINTMENT (OUTPATIENT)
Age: 84
DRG: 562 | End: 2024-12-02
Attending: INTERNAL MEDICINE
Payer: MEDICARE

## 2024-12-02 LAB
ALBUMIN SERPL-MCNC: 3.1 G/DL (ref 3.4–5)
ALP SERPL-CCNC: 58 U/L (ref 40–129)
ALT SERPL-CCNC: 32 U/L (ref 10–40)
ANION GAP SERPL CALCULATED.3IONS-SCNC: 7 MMOL/L (ref 3–16)
AST SERPL-CCNC: 32 U/L (ref 15–37)
BASOPHILS # BLD: 0 K/UL (ref 0–0.2)
BASOPHILS NFR BLD: 0.6 %
BILIRUB DIRECT SERPL-MCNC: 0.3 MG/DL (ref 0–0.3)
BILIRUB INDIRECT SERPL-MCNC: 0.2 MG/DL (ref 0–1)
BILIRUB SERPL-MCNC: 0.5 MG/DL (ref 0–1)
BUN SERPL-MCNC: 23 MG/DL (ref 7–20)
CALCIUM SERPL-MCNC: 9.8 MG/DL (ref 8.3–10.6)
CHLORIDE SERPL-SCNC: 105 MMOL/L (ref 99–110)
CO2 SERPL-SCNC: 26 MMOL/L (ref 21–32)
CREAT SERPL-MCNC: 0.8 MG/DL (ref 0.8–1.3)
DEPRECATED RDW RBC AUTO: 13.6 % (ref 12.4–15.4)
ECHO AO ASC DIAM: 4.3 CM
ECHO AO ASCENDING AORTA INDEX: 2.06 CM/M2
ECHO AO ROOT DIAM: 4 CM
ECHO AO ROOT INDEX: 1.91 CM/M2
ECHO AV AREA PEAK VELOCITY: 1.7 CM2
ECHO AV AREA VTI: 1.7 CM2
ECHO AV AREA/BSA PEAK VELOCITY: 0.8 CM2/M2
ECHO AV AREA/BSA VTI: 0.8 CM2/M2
ECHO AV MEAN GRADIENT: 9 MMHG
ECHO AV MEAN VELOCITY: 1.4 M/S
ECHO AV PEAK GRADIENT: 16 MMHG
ECHO AV PEAK VELOCITY: 2 M/S
ECHO AV VELOCITY RATIO: 0.4
ECHO AV VTI: 39 CM
ECHO BSA: 2.13 M2
ECHO LA AREA 2C: 24.9 CM2
ECHO LA AREA 4C: 29.6 CM2
ECHO LA MAJOR AXIS: 7.1 CM
ECHO LA MINOR AXIS: 5.8 CM
ECHO LA VOL BP: 103 ML (ref 18–58)
ECHO LA VOL MOD A2C: 87 ML (ref 18–58)
ECHO LA VOL MOD A4C: 99 ML (ref 18–58)
ECHO LA VOL/BSA BIPLANE: 49 ML/M2 (ref 16–34)
ECHO LA VOLUME INDEX MOD A2C: 42 ML/M2 (ref 16–34)
ECHO LA VOLUME INDEX MOD A4C: 47 ML/M2 (ref 16–34)
ECHO LV E' LATERAL VELOCITY: 9.89 CM/S
ECHO LV E' SEPTAL VELOCITY: 5.92 CM/S
ECHO LV EF PHYSICIAN: 55 %
ECHO LV FRACTIONAL SHORTENING: 34 % (ref 28–44)
ECHO LV INTERNAL DIMENSION DIASTOLE INDEX: 2.11 CM/M2
ECHO LV INTERNAL DIMENSION DIASTOLIC: 4.4 CM (ref 4.2–5.9)
ECHO LV INTERNAL DIMENSION SYSTOLIC INDEX: 1.39 CM/M2
ECHO LV INTERNAL DIMENSION SYSTOLIC: 2.9 CM
ECHO LV IVSD: 1.4 CM (ref 0.6–1)
ECHO LV MASS 2D: 227.5 G (ref 88–224)
ECHO LV MASS INDEX 2D: 108.9 G/M2 (ref 49–115)
ECHO LV POSTERIOR WALL DIASTOLIC: 1.3 CM (ref 0.6–1)
ECHO LV RELATIVE WALL THICKNESS RATIO: 0.59
ECHO LVOT AREA: 4.2 CM2
ECHO LVOT AV VTI INDEX: 0.41
ECHO LVOT DIAM: 2.3 CM
ECHO LVOT MEAN GRADIENT: 1 MMHG
ECHO LVOT PEAK GRADIENT: 3 MMHG
ECHO LVOT PEAK VELOCITY: 0.8 M/S
ECHO LVOT STROKE VOLUME INDEX: 31.8 ML/M2
ECHO LVOT SV: 66.4 ML
ECHO LVOT VTI: 16 CM
ECHO MV A VELOCITY: 0.79 M/S
ECHO MV E DECELERATION TIME (DT): 386 MS
ECHO MV E VELOCITY: 0.54 M/S
ECHO MV E/A RATIO: 0.68
ECHO MV E/E' LATERAL: 5.46
ECHO MV E/E' RATIO (AVERAGED): 7.29
ECHO MV E/E' SEPTAL: 9.12
ECHO RA AREA 4C: 19.4 CM2
ECHO RA END SYSTOLIC VOLUME APICAL 4 CHAMBER INDEX BSA: 26 ML/M2
ECHO RA VOLUME: 54 ML
ECHO RV BASAL DIMENSION: 3.4 CM
ECHO RV FREE WALL PEAK S': 24.2 CM/S
ECHO RV LONGITUDINAL DIMENSION: 7.3 CM
ECHO RV MID DIMENSION: 3.1 CM
EOSINOPHIL # BLD: 0.4 K/UL (ref 0–0.6)
EOSINOPHIL NFR BLD: 7.2 %
GFR SERPLBLD CREATININE-BSD FMLA CKD-EPI: 88 ML/MIN/{1.73_M2}
GLUCOSE SERPL-MCNC: 118 MG/DL (ref 70–99)
HCT VFR BLD AUTO: 34.9 % (ref 40.5–52.5)
HGB BLD-MCNC: 11.7 G/DL (ref 13.5–17.5)
LYMPHOCYTES # BLD: 1.4 K/UL (ref 1–5.1)
LYMPHOCYTES NFR BLD: 23.5 %
MCH RBC QN AUTO: 28.9 PG (ref 26–34)
MCHC RBC AUTO-ENTMCNC: 33.5 G/DL (ref 31–36)
MCV RBC AUTO: 86.3 FL (ref 80–100)
MONOCYTES # BLD: 0.6 K/UL (ref 0–1.3)
MONOCYTES NFR BLD: 9.4 %
NEUTROPHILS # BLD: 3.6 K/UL (ref 1.7–7.7)
NEUTROPHILS NFR BLD: 59.3 %
PLATELET # BLD AUTO: 239 K/UL (ref 135–450)
PMV BLD AUTO: 7.7 FL (ref 5–10.5)
POTASSIUM SERPL-SCNC: 3.9 MMOL/L (ref 3.5–5.1)
PROT SERPL-MCNC: 6 G/DL (ref 6.4–8.2)
RBC # BLD AUTO: 4.05 M/UL (ref 4.2–5.9)
SODIUM SERPL-SCNC: 138 MMOL/L (ref 136–145)
VALPROATE FREE MFR SERPL: ABNORMAL % (ref 5–18)
VALPROATE FREE SERPL-MCNC: <7 UG/ML (ref 7–23)
VALPROATE SERPL-MCNC: 11 UG/ML (ref 50–125)
WBC # BLD AUTO: 6.1 K/UL (ref 4–11)

## 2024-12-02 PROCEDURE — 80048 BASIC METABOLIC PNL TOTAL CA: CPT

## 2024-12-02 PROCEDURE — 80076 HEPATIC FUNCTION PANEL: CPT

## 2024-12-02 PROCEDURE — 97530 THERAPEUTIC ACTIVITIES: CPT

## 2024-12-02 PROCEDURE — 85025 COMPLETE CBC W/AUTO DIFF WBC: CPT

## 2024-12-02 PROCEDURE — 2580000003 HC RX 258: Performed by: INTERNAL MEDICINE

## 2024-12-02 PROCEDURE — 97166 OT EVAL MOD COMPLEX 45 MIN: CPT

## 2024-12-02 PROCEDURE — 97535 SELF CARE MNGMENT TRAINING: CPT

## 2024-12-02 PROCEDURE — 97116 GAIT TRAINING THERAPY: CPT

## 2024-12-02 PROCEDURE — 94760 N-INVAS EAR/PLS OXIMETRY 1: CPT

## 2024-12-02 PROCEDURE — 99233 SBSQ HOSP IP/OBS HIGH 50: CPT | Performed by: INTERNAL MEDICINE

## 2024-12-02 PROCEDURE — 6360000002 HC RX W HCPCS: Performed by: INTERNAL MEDICINE

## 2024-12-02 PROCEDURE — 93306 TTE W/DOPPLER COMPLETE: CPT | Performed by: INTERNAL MEDICINE

## 2024-12-02 PROCEDURE — 93306 TTE W/DOPPLER COMPLETE: CPT

## 2024-12-02 PROCEDURE — 97161 PT EVAL LOW COMPLEX 20 MIN: CPT

## 2024-12-02 PROCEDURE — 99233 SBSQ HOSP IP/OBS HIGH 50: CPT | Performed by: ORTHOPAEDIC SURGERY

## 2024-12-02 PROCEDURE — 23600 CLTX PROX HUMRL FX W/O MNPJ: CPT | Performed by: ORTHOPAEDIC SURGERY

## 2024-12-02 PROCEDURE — 1200000000 HC SEMI PRIVATE

## 2024-12-02 PROCEDURE — 36415 COLL VENOUS BLD VENIPUNCTURE: CPT

## 2024-12-02 PROCEDURE — 6370000000 HC RX 637 (ALT 250 FOR IP): Performed by: INTERNAL MEDICINE

## 2024-12-02 RX ORDER — ASPIRIN 81 MG/1
81 TABLET ORAL DAILY
Qty: 30 TABLET | Refills: 0 | Status: SHIPPED | OUTPATIENT
Start: 2024-12-02 | End: 2025-01-01

## 2024-12-02 RX ORDER — HYDROCODONE BITARTRATE AND ACETAMINOPHEN 5; 325 MG/1; MG/1
1 TABLET ORAL EVERY 8 HOURS PRN
Qty: 15 TABLET | Refills: 0 | Status: SHIPPED | OUTPATIENT
Start: 2024-12-02 | End: 2024-12-03

## 2024-12-02 RX ADMIN — Medication 6 MG: at 22:03

## 2024-12-02 RX ADMIN — SODIUM CHLORIDE, PRESERVATIVE FREE 10 ML: 5 INJECTION INTRAVENOUS at 22:04

## 2024-12-02 RX ADMIN — HYDROCODONE BITARTRATE AND ACETAMINOPHEN 1 TABLET: 5; 325 TABLET ORAL at 13:14

## 2024-12-02 RX ADMIN — OLANZAPINE 5 MG: 5 TABLET, FILM COATED ORAL at 11:24

## 2024-12-02 RX ADMIN — SODIUM CHLORIDE, PRESERVATIVE FREE 10 ML: 5 INJECTION INTRAVENOUS at 11:28

## 2024-12-02 RX ADMIN — OLANZAPINE 5 MG: 5 TABLET, FILM COATED ORAL at 22:03

## 2024-12-02 RX ADMIN — DIVALPROEX SODIUM 250 MG: 125 CAPSULE, COATED PELLETS ORAL at 11:24

## 2024-12-02 RX ADMIN — DIVALPROEX SODIUM 250 MG: 125 CAPSULE, COATED PELLETS ORAL at 22:03

## 2024-12-02 RX ADMIN — CITALOPRAM HYDROBROMIDE 20 MG: 20 TABLET ORAL at 11:24

## 2024-12-02 RX ADMIN — POLYETHYLENE GLYCOL 3350 17 G: 17 POWDER, FOR SOLUTION ORAL at 11:24

## 2024-12-02 RX ADMIN — ENOXAPARIN SODIUM 40 MG: 100 INJECTION SUBCUTANEOUS at 22:03

## 2024-12-02 RX ADMIN — WATER 1000 MG: 1 INJECTION INTRAMUSCULAR; INTRAVENOUS; SUBCUTANEOUS at 05:25

## 2024-12-02 ASSESSMENT — PAIN SCALES - PAIN ASSESSMENT IN ADVANCED DEMENTIA (PAINAD)
TOTALSCORE: 0
CONSOLABILITY: NO NEED TO CONSOLE
BODYLANGUAGE: RIGID, FISTS CLENCHED, KNEES UP, PUSHING/PULLING AWAY, STRIKES OUT
NEGVOCALIZATION: REPEATED TROUBLED CALLING OUT, LOUD MOANING/GROANING, CRYING
FACIALEXPRESSION: SMILING OR INEXPRESSIVE
TOTALSCORE: 0
TOTALSCORE: 0
CONSOLABILITY: NO NEED TO CONSOLE
FACIALEXPRESSION: SMILING OR INEXPRESSIVE
CONSOLABILITY: NO NEED TO CONSOLE
BODYLANGUAGE: RELAXED
TOTALSCORE: 6
CONSOLABILITY: NO NEED TO CONSOLE
BREATHING: NORMAL
BODYLANGUAGE: RELAXED
FACIALEXPRESSION: FACIAL GRIMACING
FACIALEXPRESSION: SMILING OR INEXPRESSIVE
BODYLANGUAGE: RELAXED
BREATHING: NORMAL

## 2024-12-02 ASSESSMENT — PAIN DESCRIPTION - PAIN TYPE: TYPE: ACUTE PAIN

## 2024-12-02 ASSESSMENT — PAIN DESCRIPTION - DESCRIPTORS: DESCRIPTORS: ACHING

## 2024-12-02 ASSESSMENT — PAIN SCALES - GENERAL
PAINLEVEL_OUTOF10: 0
PAINLEVEL_OUTOF10: 6

## 2024-12-02 ASSESSMENT — PAIN DESCRIPTION - FREQUENCY: FREQUENCY: INTERMITTENT

## 2024-12-02 ASSESSMENT — PAIN DESCRIPTION - LOCATION: LOCATION: ARM

## 2024-12-02 ASSESSMENT — PAIN DESCRIPTION - ORIENTATION: ORIENTATION: RIGHT

## 2024-12-02 ASSESSMENT — PAIN DESCRIPTION - ONSET: ONSET: ON-GOING

## 2024-12-02 ASSESSMENT — PAIN - FUNCTIONAL ASSESSMENT: PAIN_FUNCTIONAL_ASSESSMENT: PREVENTS OR INTERFERES SOME ACTIVE ACTIVITIES AND ADLS

## 2024-12-02 NOTE — CARE COORDINATION
12/02/24 1415   Service Assessment   Patient Orientation Alert and Oriented;Person   Cognition Dementia / Early Alzheimer's   History Provided By Significant Other   Primary Caregiver Spouse   Accompanied By/Relationship wife and son Ye   Support Systems Spouse/Significant Other;Children   Patient's Healthcare Decision Maker is: Legal Next of Kin   PCP Verified by CM Yes   Last Visit to PCP Within last 3 months   Prior Functional Level Assistance with the following:;Bathing;Dressing;Cooking;Housework;Shopping   Current Functional Level Assistance with the following:;Bathing;Dressing;Toileting;Housework;Cooking;Shopping;Mobility   Can patient return to prior living arrangement No   Ability to make needs known: Fair   Family able to assist with home care needs: No   Would you like for me to discuss the discharge plan with any other family members/significant others, and if so, who? Yes  (wife)   Financial Resources Medicare   Community Resources None   CM/SW Referral Other (see comment)  (dc needs)   Discharge Planning   Type of Residence House   Living Arrangements Spouse/Significant Other;Children   Current Services Prior To Admission Durable Medical Equipment   Current DME Prior to Arrival Other (Comment)  (grab bars in shower)   Potential Assistance Needed Skilled Nursing Facility   DME Ordered? No   Potential Assistance Purchasing Medications No   Type of Home Care Services None   Patient expects to be discharged to: Skilled nursing facility   Services At/After Discharge   Transition of Care Consult (CM Consult) SNF   Services At/After Discharge Skilled Nursing Facility (SNF)   [place] Resource Information Provided? No   Mode of Transport at Discharge BLS   Condition of Participation: Discharge Planning   The Plan for Transition of Care is related to the following treatment goals: skilled facility   The Patient and/or Patient Representative was provided with a Choice of Provider? Patient   The Patient and/Or

## 2024-12-02 NOTE — PROGRESS NOTES
Patient alert and oriented to self only (intermittently) throughout this shift. Call light within reach, able to make needs known, fall precautions in place. Sling remains in place this shift on LUE.     Electronically signed by Scott Blanca RN on 12/2/2024 at 6:19 PM

## 2024-12-02 NOTE — PROGRESS NOTES
Case discussed with Dr. Crenshaw this morning and with the patient's wife by phone.  Nonoperative management will likely be the best course for this patient given his advanced dementia.  Wife agreeable with this she was concerned about him having anesthesia.  Plan to transition to extended care facility.    Discharge possible in the next 1-2 days.  Electronically signed by ROLANDO WOLF MD on 12/2/2024 at 9:47 AM

## 2024-12-02 NOTE — PROGRESS NOTES
Patient is alert & disoriented x 4, 2/4 bed rails up, bed in lowest position, fall precautions in place, call light within reach. Pt's wife is at bedside, still undecided regarding surgery for her . Pt remains NPO for potential surgery.     Electronically signed by Scott Blanca RN on 12/2/2024 at 8:38 AM

## 2024-12-02 NOTE — PLAN OF CARE
Problem: Discharge Planning  Goal: Discharge to home or other facility with appropriate resources  12/1/2024 2358 by Kasandra Paez RN  Outcome: Progressing  Flowsheets (Taken 12/1/2024 2358)  Discharge to home or other facility with appropriate resources:   Identify barriers to discharge with patient and caregiver   Identify discharge learning needs (meds, wound care, etc)   Refer to discharge planning if patient needs post-hospital services based on physician order or complex needs related to functional status, cognitive ability or social support system   Arrange for needed discharge resources and transportation as appropriate     Problem: Pain  Goal: Verbalizes/displays adequate comfort level or baseline comfort level  12/1/2024 2358 by Kasandra Paez RN  Outcome: Progressing  Flowsheets (Taken 12/1/2024 2358)  Verbalizes/displays adequate comfort level or baseline comfort level:   Encourage patient to monitor pain and request assistance   Administer analgesics based on type and severity of pain and evaluate response   Consider cultural and social influences on pain and pain management   Assess pain using appropriate pain scale   Implement non-pharmacological measures as appropriate and evaluate response     Problem: Safety - Adult  Goal: Free from fall injury  12/1/2024 2358 by Kasandra Paez RN  Outcome: Progressing  Flowsheets (Taken 12/1/2024 2358)  Free From Fall Injury: Instruct family/caregiver on patient safety     Problem: ABCDS Injury Assessment  Goal: Absence of physical injury  12/1/2024 2358 by Kasandra Paez RN  Outcome: Progressing  Flowsheets (Taken 12/1/2024 2358)  Absence of Physical Injury: Implement safety measures based on patient assessment     Problem: Skin/Tissue Integrity  Goal: Absence of new skin breakdown  Description: 1.  Monitor for areas of redness and/or skin breakdown  2.  Assess vascular access sites hourly  3.  Every 4-6 hours minimum:  Change oxygen saturation probe site  4.

## 2024-12-02 NOTE — DISCHARGE INSTR - COC
Continuity of Care Form    Patient Name: Aki Payne   :  1940  MRN:  [id number]    Admit date:  2024  Discharge date:  12/3/24    Code Status Order: DNR-CC   Advance Directives:   Advance Care Flowsheet Documentation        Date/Time Healthcare Directive Type of Healthcare Directive Copy in Chart Healthcare Agent Appointed Healthcare Agent's Name Healthcare Agent's Phone Number    [phone] Unknown, patient unable to respond due to medical condition  --  No, copy requested from family  Legal Guardian  --  --                     Admitting Physician:  Zackary Whitman MD  PCP: Zackary Whitman MD    Discharging Nurse: Scott  Discharging Hospital Unit/Room#: L9A-2965/3131-01  Discharging Unit Phone Number: [phone]    Emergency Contact:   Extended Emergency Contact Information  Primary Emergency Contact: RonaldoilrodJess chun  Address: [address] [place] [address]  Home Phone: [phone]  Mobile Phone: [phone]  Relation: Spouse  Secondary Emergency Contact: VladrodYe chun  Home Phone: [phone]  Mobile Phone: [phone]  Relation: Child    Past Surgical History:  Past Surgical History:   Procedure Laterality Date    BLADDER REMOVAL      with ileal conduit    COLONOSCOPY  2010    Due in     COLONOSCOPY  10/05/2016    [facility]    CYSTOSCOPY N/A 10/4/2022    ENDOSCOPIC EXPLORATION OF THE ILEAL CONDUIT WITH BIOPSY AND FULGERATION performed by Joseph Elkins MD at [facility] OR    OTHER SURGICAL HISTORY  2013, 2014    Ileal conduit dilatation (Brittni)    TIBIA FRACTURE SURGERY Right 1980       Immunization History:   Immunization History   Administered Date(s) Administered    COVID-19, MODERNA BLUE border, Primary or Immunocompromised, (age 12y+), IM, 100 mcg/0.5mL 2021, 2021, 2021    Influenza Vaccine, unspecified formulation 10/27/2015    Influenza Virus Vaccine 10/22/2013, 10/28/2014    Influenza, FLUAD, (age 65 y+), IM,

## 2024-12-02 NOTE — PROGRESS NOTES
Nini pulled from patient's room at approximately 1400.    Electronically signed by Scott Blanca RN on 12/2/2024 at 5:10 PM

## 2024-12-02 NOTE — PROGRESS NOTES
[facility] Orthopedic Surgery  Progress Note         CHIEF COMPLAINT:  Left shoulder pain/ moderately displaced comminuted proximal humerus fracture.    History Obtained From:  patient, wife, family member - niece, electronic medical record    HISTORY OF PRESENT ILLNESS:   Mr. Payne is a 84 y.o.  male right handed with dementia, lives at home with his wife  who seen today at [facility] for f/u evaluation of a left shoulder injury.  The patient reports that this injury occurred when fell few days ago in the garage, wife noticed ecchymosis left arm.  He was first seen and evaluated in [facility], when he was x-rayed and I was consulted. The patient denies any other injuries.  Alleviating factors rest. Movement makes the pain worse, the sling and resting makes the pain better.  No numbness or tingling sensation.      Past Medical History:        Diagnosis Date    Bladder cancer (Former[facility]) 1992    Dr Carrasquillo    Blood in urine 09/2022    CAD (coronary artery disease) 07/2010    cardiac cath- non obstructive    Colon polyps 2006    by colonoscopy    Dementia ([facility]) 05/2017    DNR (do not resuscitate)     Hyperlipidemia        Past Surgical History:        Procedure Laterality Date    BLADDER REMOVAL  1992    with ileal conduit    COLONOSCOPY  12/2010    Due in 2015    COLONOSCOPY  10/05/2016    [facility]    CYSTOSCOPY N/A 10/4/2022    ENDOSCOPIC EXPLORATION OF THE ILEAL CONDUIT WITH BIOPSY AND FULGERATION performed by Joseph Elkins MD at [facility] OR    OTHER SURGICAL HISTORY  6/2013, 8/2014    Ileal conduit dilatation (Brittni)    TIBIA FRACTURE SURGERY Right 1980       Medications prior to admission:   Prior to Admission medications    Medication Sig Start Date End Date Taking? Authorizing Provider   HYDROcodone-acetaminophen (NORCO) 5-325 MG per tablet Take 1 tablet by mouth every 8 hours as needed for Pain for up to 5 days. Max Daily Amount: 3 tablets 12/2/24 12/7/24 Yes Gissell Farias, APRN - CNP   aspirin (ASPIR-LOW)  He is tender to palpation over the shoulder, and otherwise non tender over the remainder of the extremity.  Range of motion is decreased secondary to pain over the left shoulder.  The skin overlying the left shoulder is intact without evidence of lesion, laceration.  Distal pulses are 2+ and symmetric bilaterally.  Sensation is grossly intact to light touch and symmetric bilaterally.    NEUROLOGIC:   Sensory:    Touch:                     Right Upper Extremity:  normal                   Left Upper Extremity:  normal                  Right Lower Extremity:  normal                  Left Lower Extremity:  normal        DATA:    CBC:   Lab Results   Component Value Date/Time    WBC 6.1 12/02/2024 04:08 AM    RBC 4.05 12/02/2024 04:08 AM    HGB 11.7 12/02/2024 04:08 AM    HCT 34.9 12/02/2024 04:08 AM    MCV 86.3 12/02/2024 04:08 AM    MCH 28.9 12/02/2024 04:08 AM    MCHC 33.5 12/02/2024 04:08 AM    RDW 13.6 12/02/2024 04:08 AM     12/02/2024 04:08 AM    MPV 7.7 12/02/2024 04:08 AM     WBC:    Lab Results   Component Value Date/Time    WBC 6.1 12/02/2024 04:08 AM     PT/INR:  No results found for: \"PROTIME\", \"INR\"  PTT:  No results found for: \"APTT\"[APTT    IMAGING: Xrays dated 11/29/2024, 3 views of left shoulder were reviewed, and showed moderately displaced comminuted proximal humerus fracture.      IMPRESSION: Left shoulder pain/ moderately displaced comminuted proximal humerus fracture.      PLAN:  I discussed with Aki Payne's wife and Dr Whitman the overall alignment of the fracture and treatment options including both surgical and non-surgical treatment, and that given his medical and mental status, it would be better for him to avoid surgery.    We can try to treat this non-operatively in a sling.  We discussed the risk of nonunion and or malunion.  We will see him  back in 6 weeks at which time we will get a new xray of the left shoulder.         Mac Crenshaw MD   12/2/2024  3:52 PM

## 2024-12-02 NOTE — PROGRESS NOTES
[facility] Orthopedic Surgery   Progress Note      S/P :  SUBJECTIVE  up and about in BR with PT OT, Moves well with guidance. Wife at bedside. States she does not want pt to have surgery given he is tolerating pain well and concern his dementia may worse with anesthesia. Pt is alert and cooperative. . Pain is not  apparent.   OBJECTIVE              Physical                      VITALS:  /79   Pulse 66   Temp 98 °F (36.7 °C) (Oral)   Resp 16   Ht 1.753 m (5' 9\")   Wt 93 kg (205 lb 0.4 oz)   SpO2 93%   BMI 30.28 kg/m²                     MUSCULOSKELETAL:  Left wrist with intact flex/ext and hand grasp and extension and thumbs up                    NEUROLOGIC:                                  Sensory:  Touch:  Left Upper Extremity:  normal                                          Left arm in sling and swathe.     Data       CBC:   Lab Results   Component Value Date/Time    WBC 6.1 12/02/2024 04:08 AM    RBC 4.05 12/02/2024 04:08 AM    HGB 11.7 12/02/2024 04:08 AM    HCT 34.9 12/02/2024 04:08 AM    MCV 86.3 12/02/2024 04:08 AM    MCH 28.9 12/02/2024 04:08 AM    MCHC 33.5 12/02/2024 04:08 AM    RDW 13.6 12/02/2024 04:08 AM     12/02/2024 04:08 AM    MPV 7.7 12/02/2024 04:08 AM        WBC:    Lab Results   Component Value Date/Time    WBC 6.1 12/02/2024 04:08 AM        Hemoglobin/Hematocrit:    Lab Results   Component Value Date/Time    HGB 11.7 12/02/2024 04:08 AM    HCT 34.9 12/02/2024 04:08 AM        PT/INR:  No results found for: \"PROTIME\", \"INR\"           Current Inpatient Medications             Current Facility-Administered Medications: cefTRIAXone (ROCEPHIN) 1,000 mg in sterile water 10 mL IV syringe, 1,000 mg, IntraVENous, Q24H  sodium chloride flush 0.9 % injection 5-40 mL, 5-40 mL, IntraVENous, 2 times per day  sodium chloride flush 0.9 % injection 5-40 mL, 5-40 mL, IntraVENous, PRN  0.9 % sodium chloride infusion, , IntraVENous, PRN  enoxaparin (LOVENOX) injection 40 mg, 40 mg, SubCUTAneous,

## 2024-12-02 NOTE — ACP (ADVANCE CARE PLANNING)
Advance Care Planning   Healthcare Decision Maker:    Primary Decision Maker: Jess Payne - Spouse - [phone]    Secondary Decision Maker: Wally Payneis - Child - [phone]    Supplemental (Other) Decision Maker: Shaina Payne - Grandchild - [phone]         Electronically signed by Ida Ahumada on 12/2/2024 at 2:31 PM  [phone]

## 2024-12-02 NOTE — PROGRESS NOTES
[facility] Note      Chief Complaint: The patient is nonverbal this morning    Subjective/Interval History:    This morning the patient is alert, lying in bed.  He looks at the examiner but does not answer questions.  RN/sitter at the bedside states his night was uneventful.  He slept most of the night after getting pain medicine.  Arm is in the sling.  Patient is unable to answer the question about whether or not he is in pain or not.  No other new problems noted overnight.  Events of the weekend reviewed.    No chest pain or shortness breath. No cough or sputum. No nausea, vomiting, diarrhea. No abdominal pain. No dysuria.  The remainder of the review of systems is negative.     PMH, PSH, FH/SH reviewed and unchanged as documented in the H&P dated 2024.    Medication list reviewed    Objective:    /79   Pulse 66   Temp 98 °F (36.7 °C) (Oral)   Resp 16   Ht 1.753 m (5' 9\")   Wt 93 kg (205 lb 0.4 oz)   SpO2 91%   BMI 30.28 kg/m²   Temp  Av.9 °F (36.6 °C)  Min: 97.5 °F (36.4 °C)  Max: 98.6 °F (37 °C)    RRR  Chest- respirations are easy.  No wheezing, rhonchi, or crackles.  Abd- BS+, soft,NTND  Ext- No lower extremity edema.  Left arm with ecchymosis and edema.      The Following Labs Were Reviewed Today:     Recent Results (from the past 24 hour(s))   Basic Metabolic Panel w/ Reflex to MG    Collection Time: 24  4:08 AM   Result Value Ref Range    Sodium 138 136 - 145 mmol/L    Potassium reflex Magnesium 3.9 3.5 - 5.1 mmol/L    Chloride 105 99 - 110 mmol/L    CO2 26 21 - 32 mmol/L    Anion Gap 7 3 - 16    Glucose 118 (H) 70 - 99 mg/dL    BUN 23 (H) 7 - 20 mg/dL    Creatinine 0.8 0.8 - 1.3 mg/dL    Est, Glom Filt Rate 88 >60    Calcium 9.8 8.3 - 10.6 mg/dL   Hepatic Function Panel    Collection Time: 24  4:08 AM   Result Value Ref Range    Total Protein 6.0 (L) 6.4 - 8.2 g/dL    Albumin 3.1 (L) 3.4 - 5.0 g/dL    Alkaline Phosphatase 58 40 - 129 U/L    ALT 32 10 - 40

## 2024-12-02 NOTE — PROGRESS NOTES
Occupational Therapy  Facility/Department: [address] ORTHOPEDICS  Occupational Therapy Initial Assessment    Name: Aki Payne  : 1940  MRN: [id number]  Date of Service: 2024    Discharge Recommendations:  3-5 sessions per week, Patient would benefit from continued therapy after discharge  OT Equipment Recommendations  Other: TBD by d/c site    Aki aPyne scored a 14/24 on the AM-PAC ADL Inpatient form. Current research shows that an AM-PAC score of 17 or less is typically not associated with a discharge to the patient's home setting. Based on the patient's AM-PAC score and their current ADL deficits, it is recommended that the patient have 3-5 sessions per week of Occupational Therapy at d/c to increase the patient's independence.  Please see assessment section for further patient specific details.    If patient discharges prior to next session this note will serve as a discharge summary.  Please see below for the latest assessment towards goals.       Patient Diagnosis(es): The primary encounter diagnosis was Closed displaced fracture of surgical neck of left humerus, unspecified fracture morphology, initial encounter. Diagnoses of Frequent falls, History of dementia, General weakness, Physical debility, and Syncope, unspecified syncope type were also pertinent to this visit.  Past Medical History:  has a past medical history of Bladder cancer (HCC), Blood in urine, CAD (coronary artery disease), Colon polyps, Dementia (HCC), DNR (do not resuscitate), and Hyperlipidemia.  Past Surgical History:  has a past surgical history that includes Bladder removal (); Colonoscopy (2010); other surgical history (2013, 2014); Colonoscopy (10/05/2016); Tibia fracture surgery (Right, ); and Cystoscopy (N/A, 10/4/2022).    Treatment Diagnosis: decreased cognition/balance/ROM/ADLs/fxl mobility    Assessment  Performance deficits / Impairments: Decreased functional mobility ;Decreased safe

## 2024-12-02 NOTE — PROGRESS NOTES
Subjective  General  Chart Reviewed: Yes  Patient assessed for rehabilitation services?: Yes  Additional Pertinent Hx: Per H and P:  \"84 y.o. male with a history of bladder cx (with urostomy), CAD, dementia, and HLD who presents to the hospital for (unwitnessed) fall.\" Work up reveals moderately displaced comminuted proximal humerus fracture.  12-2-24: per wife, PCP, and ortho discussion, decision made to treat nonoperatively at this time.  Response To Previous Treatment: Not applicable  Family / Caregiver Present: Yes (wife)  Referring Practitioner: Yohan Flowers DO  Referral Date : 11/29/24  Subjective  Subjective: Patient in bed lightly sleeping. Gradually awakens for therapists. Follows simple verbal/tactile cues. Indicates pain L UE, seemingly mild, during bed mobility, transfers, and gait.         Social/Functional History  Social/Functional History  Lives With: Spouse (son and grandson. Both gone during daytime hours. Son gone for days at a time.)  Type of Home: House  Home Layout: Two level, Laundry in basement, Bed/Bath upstairs, 1/2 bath on main level  Home Access: Stairs to enter with rails  Entrance Stairs - Number of Steps: 1 + 1 back of home  Entrance Stairs - Rails: Right  Bathroom Shower/Tub: Tub/Shower unit  Bathroom Toilet: Handicap height (1st floor)  Bathroom Equipment: Grab bars in shower  Has the patient had two or more falls in the past year or any fall with injury in the past year?:  (just the fall prior to current admission)  Receives Help From: Family  ADL Assistance: Independent (wife assists with showering)  Homemaking Responsibilities: No  Ambulation Assistance: Independent  Transfer Assistance: Independent  Active : No  Occupation: Retired  Type of Occupation: lithographer  Additional Comments: sleeps in regular bed.    Vision/Hearing  Vision  Vision: Impaired (per wife, glasses not worn often (\"usually lost\"))  Vision Exceptions: Wears glasses for  reading  Hearing  Hearing: Exceptions to WFL  Hearing Exceptions: Hard of hearing/hearing concerns;Bilateral hearing aid (won't wear)      Cognition   Orientation  Overall Orientation Status:  (oriented to name and able to pick between 2 choices for correct birth month but unable to pick correct year)  Orientation Level: Disoriented to time;Disoriented to situation;Disoriented to place  Cognition  Overall Cognitive Status: Exceptions  Arousal/Alertness: Appropriate responses to stimuli  Following Commands: Follows one step commands with increased time;Follows one step commands with repetition  Attention Span: Attends with cues to redirect  Memory: Decreased recall of recent events;Decreased short term memory;Decreased recall of biographical Information  Safety Judgement: Decreased awareness of need for assistance;Decreased awareness of need for safety;Impaired  Problem Solving: Assistance required to identify errors made;Assistance required to correct errors made;Impaired;Decreased awareness of errors  Insights: Decreased awareness of deficits  Initiation: Requires cues for all  Sequencing: Requires cues for some  Cognition Comment: hx of dementia    Objective  Temp: 98 °F (36.7 °C)  Pulse: 66  Heart Rate Source: Monitor  Respirations: 16  SpO2: 93 %  O2 Device: None (Room air)  BP: 128/79  MAP (Calculated): 95  BP Location: Right Arm  BP Method: Automatic  Patient Position: Semi fowlers  Orthostatic B/P and Pulse?: Yes  Blood Pressure Lyin/72  Blood Pressure Sittin/80  Blood Pressure Standin/73  Pulse Lyin PER MINUTE  Pulse Sittin PER MINUTE  Pulse Standin PER MINUTE     Observation/Palpation  Observation: moderate bruising L humerus. Sling and swathe L UE adjusted.  AROM RLE (degrees)  RLE AROM: WFL  AROM LLE (degrees)  LLE AROM : WFL  Strength RLE  Strength RLE: WFL  Strength LLE  Strength LLE: WFL  Bed mobility  Supine to Sit: Moderate assistance;2 Person assistance (HOB up,

## 2024-12-02 NOTE — CARE COORDINATION
Ostomy referral for urostomy. Staff to do routine changes, supplies in supply room. Demetrio Wall, MSN, RN, CWOCN

## 2024-12-02 NOTE — PLAN OF CARE
Problem: Discharge Planning  Goal: Discharge to home or other facility with appropriate resources  12/2/2024 0842 by Scott Blanca RN  Outcome: Progressing  Flowsheets (Taken 12/2/2024 0820)  Discharge to home or other facility with appropriate resources: Identify barriers to discharge with patient and caregiver     Problem: Pain  Goal: Verbalizes/displays adequate comfort level or baseline comfort level  12/2/2024 0842 by Scott Blanca RN  Outcome: Progressing     Problem: Safety - Adult  Goal: Free from fall injury  12/2/2024 0842 by Scott Blanca RN  Outcome: Progressing     Problem: ABCDS Injury Assessment  Goal: Absence of physical injury  12/2/2024 0842 by Scott Blanca RN  Outcome: Progressing     Problem: Skin/Tissue Integrity  Goal: Absence of new skin breakdown  Description: 1.  Monitor for areas of redness and/or skin breakdown  2.  Assess vascular access sites hourly  3.  Every 4-6 hours minimum:  Change oxygen saturation probe site  4.  Every 4-6 hours:  If on nasal continuous positive airway pressure, respiratory therapy assess nares and determine need for appliance change or resting period.  12/2/2024 0842 by Scott Blanca, RN  Outcome: Progressing

## 2024-12-03 VITALS
WEIGHT: 207.89 LBS | RESPIRATION RATE: 16 BRPM | DIASTOLIC BLOOD PRESSURE: 78 MMHG | TEMPERATURE: 98.4 F | HEART RATE: 76 BPM | HEIGHT: 69 IN | BODY MASS INDEX: 30.79 KG/M2 | SYSTOLIC BLOOD PRESSURE: 158 MMHG | OXYGEN SATURATION: 94 %

## 2024-12-03 LAB
ALBUMIN SERPL-MCNC: 3.2 G/DL (ref 3.4–5)
ALP SERPL-CCNC: 65 U/L (ref 40–129)
ALT SERPL-CCNC: 38 U/L (ref 10–40)
ANION GAP SERPL CALCULATED.3IONS-SCNC: 9 MMOL/L (ref 3–16)
AST SERPL-CCNC: 35 U/L (ref 15–37)
BACTERIA UR CULT: ABNORMAL
BACTERIA UR CULT: ABNORMAL
BASOPHILS # BLD: 0 K/UL (ref 0–0.2)
BASOPHILS NFR BLD: 0.4 %
BILIRUB DIRECT SERPL-MCNC: 0.3 MG/DL (ref 0–0.3)
BILIRUB INDIRECT SERPL-MCNC: 0.2 MG/DL (ref 0–1)
BILIRUB SERPL-MCNC: 0.5 MG/DL (ref 0–1)
BUN SERPL-MCNC: 20 MG/DL (ref 7–20)
CALCIUM SERPL-MCNC: 10.3 MG/DL (ref 8.3–10.6)
CHLORIDE SERPL-SCNC: 106 MMOL/L (ref 99–110)
CO2 SERPL-SCNC: 25 MMOL/L (ref 21–32)
CREAT SERPL-MCNC: 0.7 MG/DL (ref 0.8–1.3)
DEPRECATED RDW RBC AUTO: 13.8 % (ref 12.4–15.4)
EOSINOPHIL # BLD: 0.3 K/UL (ref 0–0.6)
EOSINOPHIL NFR BLD: 4.4 %
GFR SERPLBLD CREATININE-BSD FMLA CKD-EPI: >90 ML/MIN/{1.73_M2}
GLUCOSE SERPL-MCNC: 125 MG/DL (ref 70–99)
HCT VFR BLD AUTO: 34.6 % (ref 40.5–52.5)
HGB BLD-MCNC: 11.8 G/DL (ref 13.5–17.5)
LYMPHOCYTES # BLD: 1.2 K/UL (ref 1–5.1)
LYMPHOCYTES NFR BLD: 16.9 %
MCH RBC QN AUTO: 29.3 PG (ref 26–34)
MCHC RBC AUTO-ENTMCNC: 34.2 G/DL (ref 31–36)
MCV RBC AUTO: 85.7 FL (ref 80–100)
MONOCYTES # BLD: 0.7 K/UL (ref 0–1.3)
MONOCYTES NFR BLD: 9 %
NEUTROPHILS # BLD: 5 K/UL (ref 1.7–7.7)
NEUTROPHILS NFR BLD: 69.3 %
ORGANISM: ABNORMAL
ORGANISM: ABNORMAL
PLATELET # BLD AUTO: 261 K/UL (ref 135–450)
PMV BLD AUTO: 7.6 FL (ref 5–10.5)
POTASSIUM SERPL-SCNC: 3.9 MMOL/L (ref 3.5–5.1)
PROT SERPL-MCNC: 6.1 G/DL (ref 6.4–8.2)
RBC # BLD AUTO: 4.04 M/UL (ref 4.2–5.9)
SODIUM SERPL-SCNC: 140 MMOL/L (ref 136–145)
WBC # BLD AUTO: 7.3 K/UL (ref 4–11)

## 2024-12-03 PROCEDURE — 6370000000 HC RX 637 (ALT 250 FOR IP): Performed by: INTERNAL MEDICINE

## 2024-12-03 PROCEDURE — 80076 HEPATIC FUNCTION PANEL: CPT

## 2024-12-03 PROCEDURE — 85025 COMPLETE CBC W/AUTO DIFF WBC: CPT

## 2024-12-03 PROCEDURE — 2580000003 HC RX 258: Performed by: INTERNAL MEDICINE

## 2024-12-03 PROCEDURE — 99239 HOSP IP/OBS DSCHRG MGMT >30: CPT | Performed by: INTERNAL MEDICINE

## 2024-12-03 PROCEDURE — 36415 COLL VENOUS BLD VENIPUNCTURE: CPT

## 2024-12-03 PROCEDURE — 6360000002 HC RX W HCPCS: Performed by: INTERNAL MEDICINE

## 2024-12-03 PROCEDURE — 97530 THERAPEUTIC ACTIVITIES: CPT

## 2024-12-03 PROCEDURE — 94760 N-INVAS EAR/PLS OXIMETRY 1: CPT

## 2024-12-03 PROCEDURE — 80048 BASIC METABOLIC PNL TOTAL CA: CPT

## 2024-12-03 RX ORDER — CEFDINIR 300 MG/1
300 CAPSULE ORAL EVERY 12 HOURS SCHEDULED
Qty: 8 CAPSULE | Refills: 0 | Status: SHIPPED | OUTPATIENT
Start: 2024-12-04 | End: 2024-12-08

## 2024-12-03 RX ORDER — CEFDINIR 300 MG/1
300 CAPSULE ORAL EVERY 12 HOURS SCHEDULED
Status: DISCONTINUED | OUTPATIENT
Start: 2024-12-04 | End: 2024-12-03 | Stop reason: HOSPADM

## 2024-12-03 RX ORDER — HYDROCODONE BITARTRATE AND ACETAMINOPHEN 5; 325 MG/1; MG/1
1 TABLET ORAL EVERY 6 HOURS PRN
Qty: 28 TABLET | Refills: 0 | Status: SHIPPED | OUTPATIENT
Start: 2024-12-03 | End: 2024-12-10

## 2024-12-03 RX ADMIN — SODIUM CHLORIDE, PRESERVATIVE FREE 10 ML: 5 INJECTION INTRAVENOUS at 10:15

## 2024-12-03 RX ADMIN — CITALOPRAM HYDROBROMIDE 20 MG: 20 TABLET ORAL at 10:12

## 2024-12-03 RX ADMIN — DIVALPROEX SODIUM 250 MG: 125 CAPSULE, COATED PELLETS ORAL at 10:11

## 2024-12-03 RX ADMIN — OLANZAPINE 5 MG: 5 TABLET, FILM COATED ORAL at 10:12

## 2024-12-03 RX ADMIN — WATER 1000 MG: 1 INJECTION INTRAMUSCULAR; INTRAVENOUS; SUBCUTANEOUS at 05:33

## 2024-12-03 NOTE — PROGRESS NOTES
Report called to TEJA Kohler at [facility].    Electronically signed by Scott Blanca RN on 12/3/2024 at 3:32 PM

## 2024-12-03 NOTE — PROGRESS NOTES
Patient is alert & oriented to self only, x1 assist with walker, 2/4 bed rails up, bed in lowest position, fall precautions in place, call light within reach. Morning medications given whole in applesauce, no complications.     Electronically signed by Scott Blanca RN on 12/3/2024 at 10:33 AM

## 2024-12-03 NOTE — PROGRESS NOTES
[facility] Note      Chief Complaint: The patient is sleeping this morning    Subjective/Interval History:    This morning the patient is sleeping soundly.  He wakes briefly to voice but goes back to sleep quickly.  Sitter at the bedside reports he had an uneventful night.  No new problems noted.    Wife at the bedside, questions answered.  Plan is for ECF, likely today.    Echocardiogram reviewed, EF normal.    PMH, PSH, FH/SH reviewed and unchanged as documented in the H&P dated 2024.    Medication list reviewed    Objective:    BP (!) 166/85   Pulse 67   Temp 98.7 °F (37.1 °C) (Axillary)   Resp 18   Ht 1.753 m (5' 9\")   Wt 94.3 kg (207 lb 14.3 oz)   SpO2 92%   BMI 30.70 kg/m²   Temp  Av.4 °F (36.9 °C)  Min: 97.9 °F (36.6 °C)  Max: 99.1 °F (37.3 °C)    RRR  Chest- respirations are easy.  No wheezing, rhonchi, or crackles.  Abd- BS+, soft,NTND  Ext- No lower extremity edema.  Left arm with ecchymosis and edema.      The Following Labs Were Reviewed Today:     Recent Results (from the past 24 hour(s))   Echo (TTE) complete (PRN contrast/bubble/strain/3D)    Collection Time: 24 10:59 AM   Result Value Ref Range    Body Surface Area 2.13 m2    LA Minor Axis 5.8 cm    LA Major Axis 7.1 cm    LA Area 2C 24.9 cm2    LA Area 4C 29.6 cm2    LA Volume MOD A2C 87 (A) 18 - 58 mL    LA Volume MOD A4C 99 (A) 18 - 58 mL    LA Volume  (A) 18 - 58 mL    RA Area 4C 19.4 cm2    RA Volume 54 ml    AV Mean Gradient 9 mmHg    AV VTI 39.0 cm    AV Mean Velocity 1.4 m/s    AV Peak Velocity 2.0 m/s    AV Peak Gradient 16 mmHg    AV Area by VTI 1.7 cm2    AV Area by Peak Velocity 1.7 cm2    Aortic Root 4.0 cm    Ascending Aorta 4.3 cm    IVSd 1.4 (A) 0.6 - 1.0 cm    LVIDd 4.4 4.2 - 5.9 cm    LVIDs 2.9 cm    LVOT Diameter 2.3 cm    LVOT Mean Gradient 1 mmHg    LVOT VTI 16.0 cm    LVOT Peak Velocity 0.8 m/s    LVOT Peak Gradient 3 mmHg    LVPWd 1.3 (A) 0.6 - 1.0 cm    LV E' Lateral Velocity 9.89

## 2024-12-03 NOTE — PROGRESS NOTES
Patient resting in bed quietly this evening, disoriented x4. Vital signs stable. PIV flushed without issue, dressing CDI, normal saline locked. Pain assessed using the advanced dementia scale and score is 0. All nightly medication taken crushed in applesauce. Urostomy remains in place, patent and draining. LUE remains in sling. SSKIN bundle implemented. No additional needs verbalized at this time. Standard safety precautions in place and call light within reach. Will continue to monitor and assess. Electronically signed by Hamida Tony RN on 12/2/2024 at 10:16 PM

## 2024-12-03 NOTE — PROGRESS NOTES
Patient alert and oriented to self only, discharged to [facility] with documented belongings. IV removed with no complications. Transported out of [facility] by stretcher. Reviewed discharge, follow up, and medication instructions with patient and patient verbalized understanding.     Electronically signed by Scott Blanca RN on 12/3/2024 at 3:25 PM

## 2024-12-03 NOTE — CARE COORDINATION
DISCHARGE SUMMARY     DATE OF DISCHARGE: 12/3/24    DISCHARGE DESTINATION: skilled at [facility]   FACILITY    Level of Care: Skilled  Discharging to Facility/ Agency   Discharging to Facility/ Agency   Name: [facility]  Address:  [address], [place] [address]   Phone:  [phone]  Fax:  [phone]    Precert Obtained: N/A    Hens Completed: yes    PASARR: N/A    Notified: RN, Family, and Facility/Agency    TRANSPORTATION: Stretcher    Company Name: [facility]     Time: 3:00PM    Phone Number: [phone]    Electronically signed by Ida Ahumada on 12/3/2024 at 11:39 AM  #[phone]

## 2024-12-03 NOTE — PLAN OF CARE
Problem: Discharge Planning  Goal: Discharge to home or other facility with appropriate resources  12/3/2024 1042 by Scott Blanca RN  Outcome: Progressing     Problem: Pain  Goal: Verbalizes/displays adequate comfort level or baseline comfort level  12/3/2024 1042 by Scott Blanca RN  Outcome: Progressing     Problem: Safety - Adult  Goal: Free from fall injury  12/3/2024 1042 by Scott Blanca RN  Outcome: Progressing     Problem: ABCDS Injury Assessment  Goal: Absence of physical injury  12/3/2024 1042 by Scott Blanca RN  Outcome: Progressing     Problem: Safety - Adult  Goal: Free from fall injury  12/3/2024 1042 by Scott Blanca RN  Outcome: Progressing     Problem: ABCDS Injury Assessment  Goal: Absence of physical injury  12/3/2024 1042 by Scott Blanca RN  Outcome: Progressing     Problem: Skin/Tissue Integrity  Goal: Absence of new skin breakdown  Description: 1.  Monitor for areas of redness and/or skin breakdown  2.  Assess vascular access sites hourly  3.  Every 4-6 hours minimum:  Change oxygen saturation probe site  4.  Every 4-6 hours:  If on nasal continuous positive airway pressure, respiratory therapy assess nares and determine need for appliance change or resting period.  12/3/2024 1042 by Scott Blanca RN  Outcome: Progressing

## 2024-12-03 NOTE — PROGRESS NOTES
[facility] Orthopedic Surgery   Progress Note      S/P :  SUBJECTIVE  in bed. Sleeping. Resp easy. Skin warm and pink. Arouses briefly then dozed to sleep Does not appear to be in distress. .     OBJECTIVE              Physical                      VITALS:  BP (!) 166/85   Pulse 67   Temp 98.7 °F (37.1 °C) (Axillary)   Resp 18   Ht 1.753 m (5' 9\")   Wt 94.3 kg (207 lb 14.3 oz)   SpO2 92%   BMI 30.70 kg/m²                     MUSCULOSKELETAL:  left hand warm and pink with strong radial pulse noted.                    Left arm in sling and swathe  Data       CBC:   Lab Results   Component Value Date/Time    WBC 7.3 12/03/2024 04:43 AM    RBC 4.04 12/03/2024 04:43 AM    HGB 11.8 12/03/2024 04:43 AM    HCT 34.6 12/03/2024 04:43 AM    MCV 85.7 12/03/2024 04:43 AM    MCH 29.3 12/03/2024 04:43 AM    MCHC 34.2 12/03/2024 04:43 AM    RDW 13.8 12/03/2024 04:43 AM     12/03/2024 04:43 AM    MPV 7.6 12/03/2024 04:43 AM        WBC:    Lab Results   Component Value Date/Time    WBC 7.3 12/03/2024 04:43 AM        Hemoglobin/Hematocrit:    Lab Results   Component Value Date/Time    HGB 11.8 12/03/2024 04:43 AM    HCT 34.6 12/03/2024 04:43 AM        PT/INR:  No results found for: \"PROTIME\", \"INR\"           Current Inpatient Medications             Current Facility-Administered Medications: [START ON 12/4/2024] cefdinir (OMNICEF) capsule 300 mg, 300 mg, Oral, 2 times per day  sodium chloride flush 0.9 % injection 5-40 mL, 5-40 mL, IntraVENous, 2 times per day  sodium chloride flush 0.9 % injection 5-40 mL, 5-40 mL, IntraVENous, PRN  0.9 % sodium chloride infusion, , IntraVENous, PRN  enoxaparin (LOVENOX) injection 40 mg, 40 mg, SubCUTAneous, Nightly  ondansetron (ZOFRAN-ODT) disintegrating tablet 4 mg, 4 mg, Oral, Q8H PRN **OR** ondansetron (ZOFRAN) injection 4 mg, 4 mg, IntraVENous, Q6H PRN  polyethylene glycol (GLYCOLAX) packet 17 g, 17 g, Oral, Daily PRN  acetaminophen (TYLENOL) tablet 1,000 mg, 1,000 mg, Oral, Q8H

## 2024-12-03 NOTE — PROGRESS NOTES
Physical Therapy  Facility/Department: [address] ORTHOPEDICS  Daily Treatment Note    This note serves as patient discharge summary if pt discharges prior to next PT visit      Name: Aki Payne  : 1940  MRN: [id number]  Date of Service: 12/3/2024    Discharge Recommendations:  Therapy recommended at discharge (3-5)      Aki Payne scored a 15/24 on the AM-PAC short mobility form. Current research shows that an AM-PAC score of 17 or less is typically not associated with a discharge to the patient's home setting. Based on the patient's AM-PAC score and their current functional mobility deficits, it is recommended that the patient have 3-5 sessions per week of Physical Therapy at d/c to increase the patient's independence.  Please see assessment section for further patient specific details.        Patient Diagnosis(es): The primary encounter diagnosis was Closed displaced fracture of surgical neck of left humerus, unspecified fracture morphology, initial encounter. Diagnoses of Frequent falls, History of dementia, General weakness, Physical debility, and Syncope, unspecified syncope type were also pertinent to this visit.  Past Medical History:  has a past medical history of Bladder cancer (HCC), Blood in urine, CAD (coronary artery disease), Colon polyps, Dementia (HCC), DNR (do not resuscitate), and Hyperlipidemia.  Past Surgical History:  has a past surgical history that includes Bladder removal (); Colonoscopy (2010); other surgical history (2013, 2014); Colonoscopy (10/05/2016); Tibia fracture surgery (Right, 1980); and Cystoscopy (N/A, 10/4/2022).    Assessment  Body Structures, Functions, Activity Limitations Requiring Skilled Therapeutic Intervention: Decreased functional mobility ;Decreased ADL status;Decreased balance;Decreased cognition;Decreased posture  Assessment: Per H and P: \"84 y.o. male with a history of bladder cx (with urostomy), CAD, dementia, and HLD who presents to  Orientation Status:  (oriented to name and able to pick between 2 choices for correct birth month but unable to pick correct year)  Orientation Level: Disoriented to time;Disoriented to situation;Disoriented to place  Cognition  Overall Cognitive Status: Exceptions  Arousal/Alertness: Appropriate responses to stimuli  Following Commands: Follows one step commands with increased time;Follows one step commands with repetition  Attention Span: Attends with cues to redirect  Memory: Decreased recall of recent events;Decreased short term memory;Decreased recall of biographical Information  Safety Judgement: Decreased awareness of need for assistance;Decreased awareness of need for safety;Impaired  Problem Solving: Assistance required to identify errors made;Assistance required to correct errors made;Impaired;Decreased awareness of errors  Insights: Decreased awareness of deficits  Initiation: Requires cues for all  Sequencing: Requires cues for some  Cognition Comment: hx of dementia    Objective     Observation/Palpation  Observation: moderate bruising L humerus. Sling and swathe L UE adjusted.  Bed mobility  Supine to Sit: Moderate assistance (increased time. HOB up. Exiting to his R.)  Sit to Supine: Moderate assistance;2 Person assistance  Scooting: Dependent/Total;2 Person assistance  Transfers  Sit to Stand: Contact guard assistance;Minimal Assistance (multiple attempts. From EOB and BS chair x 2.)  Stand to Sit: Contact guard assistance;Minimal Assistance (To commode and BS chair x 2 and EOB.)  Ambulation  Surface: Level tile  Device: No Device (right arm hold assist)  Assistance: Minimal assistance  Quality of Gait: trace posterior lean. Shortened steps. Takes a few steps then states, \"This is ridiculous.\"  He seems to resist further ambulation and presents with increasing posterior lean. Chair brought to patient for safety, but after ~40 seconds, patient begins to ambulate again.  Requires min assist and coaxing

## 2024-12-03 NOTE — PLAN OF CARE
Problem: Discharge Planning  Goal: Discharge to home or other facility with appropriate resources  12/2/2024 2217 by Hamida Tony RN  Outcome: Progressing  Flowsheets (Taken 12/2/2024 2217)  Discharge to home or other facility with appropriate resources:   Identify barriers to discharge with patient and caregiver   Identify discharge learning needs (meds, wound care, etc)   Arrange for needed discharge resources and transportation as appropriate  12/2/2024 0842 by Scott Blanca RN  Outcome: Progressing  Flowsheets (Taken 12/2/2024 0820)  Discharge to home or other facility with appropriate resources: Identify barriers to discharge with patient and caregiver     Problem: Pain  Goal: Verbalizes/displays adequate comfort level or baseline comfort level  12/2/2024 2217 by Hamida Tony RN  Outcome: Progressing  Flowsheets (Taken 12/2/2024 2217)  Verbalizes/displays adequate comfort level or baseline comfort level:   Encourage patient to monitor pain and request assistance   Administer analgesics based on type and severity of pain and evaluate response   Assess pain using appropriate pain scale   Implement non-pharmacological measures as appropriate and evaluate response  12/2/2024 0842 by Scott Blanca RN  Outcome: Progressing     Problem: Safety - Adult  Goal: Free from fall injury  12/2/2024 2217 by Hamida Tony RN  Outcome: Progressing  Flowsheets  Taken 12/2/2024 2217 by Hamida Tony RN  Free From Fall Injury: Instruct family/caregiver on patient safety  Taken 12/2/2024 0844 by Scott Blanca RN  Free From Fall Injury: Instruct family/caregiver on patient safety  12/2/2024 0842 by Scott Blanca RN  Outcome: Progressing     Problem: ABCDS Injury Assessment  Goal: Absence of physical injury  12/2/2024 2217 by Hamida Tony RN  Outcome: Progressing  Flowsheets  Taken 12/2/2024 2217 by Hamida Tony RN  Absence of Physical Injury: Implement safety measures based on

## 2024-12-04 ENCOUNTER — CARE COORDINATION (OUTPATIENT)
Dept: CARE COORDINATION | Age: 84
End: 2024-12-04

## 2024-12-04 NOTE — CARE COORDINATION
Care Transitions Post-Acute Facility Discharge Call    2024    Patient: Aki Payne Patient : 1940   MRN: [id number]  Reason for Admission: frequent falls, closed displaced fracture of surgical neck of left humerus, debility, syncope, acute metabolic encephalopathy   Discharge Date: 12/3/24 RARS: Readmission Risk Score: 13.7       Post Acute Facility Update    Care Transitions Post Acute Facility Update    Care Transitions Interventions      Post Acute Facility Update   Post Acute Facility: [facility]    ELOS: 19 days      Nursing   Prescribed diet: general    Wounds: Neg   Reported Nursing Updates: DNRCC. PT/ OT/ ST eval and treat. Urostomy care.        Rehab/Functional   Prior Level of Functioning: home with wife, son and DIL       SW/Discharge Planning   Palliative/Hospice Referral indicated: Neg   Discharge Planning / Barriers: Pt from 2 level home with wife, son, and grandson. Bed/ bath on 2nd floor. Assist ADL/ iADL, independent walking. DME at home. Pt son and grandson are not home during the day.    Care Progression on Track?: Pos  Next IDT Planned Review: 24           Next IDT Planned Review: 2024    Future Appointments   Date Time Provider Department Center   2024 10:30 AM Zackary Whitman MD [facility] ECC DEP   2024  1:15 PM Mac Crenshaw MD  ORTHO [facility]       SN Notes:   Diet: - Oral Diet:  General  Wounds: none  Medications: Other: med rec complete   Other:    Post-acute CC Notes: PCC for admission     JEFF GARRIDO RN

## 2024-12-06 NOTE — PROGRESS NOTES
Physician Progress Note      PATIENT:               RAFAEL PENNINGTON  CSN #:                  [id number]  :                       1940  ADMIT DATE:       2024 3:56 PM  DISCH DATE:        12/3/2024 3:23 PM  RESPONDING  PROVIDER #:        Zackary Whitman MD          QUERY TEXT:    Patient admitted with fractured humerus following fall and UTI. Noted to have   advanced dementia and acute metabolic encephalopathy documented.  Per H&P \"Pt   is A&OX0 at baseline\" and \"Wife does express pt has been more fatigued and   somnolent over the last week\" in  progress note.  In order to support the   diagnosis of acute metabolic encephalopathy, please include additional   clinical indicators in your documentation.  Or please document if the   diagnosis of acute metabolic encephalopathy has been ruled out after further   study.    The medical record reflects the following:  Risk Factors: advanced dementia, UTI  Clinical Indicators: Per H&P \"Pt is A&OX0 at baseline\" and \"Wife does express   pt has been more fatigued and somnolent over the last week\" in  progress   note.  Treatment: UA, urine culture, Rocephin, discharged on Omnicef  Options provided:  -- Acute metabolic encephalopathy present as evidenced by, Please document   evidence.  -- Worsening of dementia.  Acute metabolic encephalopathy was ruled out  -- Other - I will add my own diagnosis  -- Disagree - Not applicable / Not valid  -- Disagree - Clinically unable to determine / Unknown  -- Refer to Clinical Documentation Reviewer    PROVIDER RESPONSE TEXT:    Worsening of dementia. Acute metabolic encephalopathy was ruled out    Query created by: Nighat Blanco on 2024 4:43 AM      Electronically signed by:  Zackary Whitman MD 2024 8:52 AM

## 2024-12-11 ENCOUNTER — CARE COORDINATION (OUTPATIENT)
Dept: CARE COORDINATION | Age: 84
End: 2024-12-11

## 2024-12-11 NOTE — CARE COORDINATION
Care Transitions Post-Acute Facility Discharge Call    2024    Patient: Aki Payne Patient : 1940   MRN: [id number]  Reason for Admission: frequent falls, closed displaced fracture of surgical neck of left humerus, debility, syncope, acute metabolic encephalopathy    Discharge Date: 12/3/24 RARS: Readmission Risk Score: 13.7       Post Acute Facility Update    Care Transitions Post Acute Facility Update    Care Transitions Interventions      Post Acute Facility Update   Post Acute Facility: [facility]    ELOS: 19 days      Nursing   Prescribed diet: regular    Nutrition intake assessment: 25-50%   Wounds: Neg   Disease specific clinical considerations: CAD, HLD, dementia/ Alzheimer's, urostomy RLQ   Reported Nursing Updates: 198.4#, 138/86, 98.5, 90 bpm, 20, 100% on room air, pain 4/10 (left arm but does not ask for pain med- pain not limiting therapy). Urostomy draining well without s/s infection     LOS charting  Current LOS:  8      ELOS:  16-19  Anticipated DOD: no date set  3DW admission?  no  PLOF: home with wife   Goals of care:  return home   Wounds/ treatment/ stage: no  Labs? no   Disease specific clinical considerations: CAD, HLD, dementia/ Alzheimer's, urostomy RLQ  Palliative/ hospice referral? no  Does caregiver need any support/ have needs? no  Anticipated DC dispo/ services/ date: plan is home with wife- will know more after CC today   DME/ equipment needed at DC? Unknown    Barriers to transition? NWB to LUE/ sling, cognition, weakness  Care progression on track w/ ELOS? yes  Interventions if progression not on track? n/a  Care conference- today   Appointments? Ortho            Rehab/Functional   Prior Level of Functioning: home with wife assist ADL/ iADL   Cognitive function assessment: BIMS 0/15- severe cog impairment   ADLs: Maximum Assistance   Bed Mobility: Minimal Assistance   Transfer Assistance: Minimal Assistance   Ambulation Assistance: Contact Guard Assist -

## 2024-12-18 ENCOUNTER — OFFICE VISIT (OUTPATIENT)
Dept: ORTHOPEDIC SURGERY | Age: 84
End: 2024-12-18

## 2024-12-18 ENCOUNTER — CARE COORDINATION (OUTPATIENT)
Dept: CARE COORDINATION | Age: 84
End: 2024-12-18

## 2024-12-18 VITALS — HEIGHT: 69 IN | BODY MASS INDEX: 30.66 KG/M2 | RESPIRATION RATE: 16 BRPM | WEIGHT: 207 LBS

## 2024-12-18 DIAGNOSIS — M25.512 ACUTE PAIN OF LEFT SHOULDER: Primary | ICD-10-CM

## 2024-12-18 DIAGNOSIS — S42.292A OTHER CLOSED DISPLACED FRACTURE OF PROXIMAL END OF LEFT HUMERUS, INITIAL ENCOUNTER: ICD-10-CM

## 2024-12-18 PROCEDURE — 99024 POSTOP FOLLOW-UP VISIT: CPT | Performed by: NURSE PRACTITIONER

## 2024-12-18 NOTE — CARE COORDINATION
Prior Level of Functioning: home with wife assist ADL/ iADL   Cognitive function assessment: BIMS 0/15- severe cog impairment   ADLs: Maximum Assistance   Bed Mobility: Stand by Assist - Presence and Cueing   Transfer Assistance: Stand by Assist - Presence and Cueing   Ambulation Assistance: Stand by Assist - Presence and Cueing, Independent   How far (in feet) is the patient ambulating?: 300   Does patient use an assistive device?: No   Rehab Notes: Remains NWB (officially despite pt non compliance with it and wearing sling). Pt sees ortho today- hoping for an increase in weight bearing status. Pt wanders facility with and without assist/ AD, up 15 steps- min x 2, down steps- max with VC. Wander guard on.        SW/Discharge Planning   Discharge Planning / Barriers: Pt from 2 level home with wife, son, and grandson. Bed/ bath on 2nd floor. Assist ADL/ iADL, independent walking. DME at home. Pt son and grandson are not home during the day.   12/11- CC is today. Plan is to return home. Pt wife can help him. No date set. Cognition limits progress with regard to NWB LUE and using sling  12/18- Facility moving pt to memory care floor which is locked for his safety until DC decision made on where he will safely discharge. LCD will be given for later next week. IDT and pt granddaughter who works as a nurse at the facility are hoping to change his wife's decision on bringing him home. They are recommending LTC/ memory care     Care Progression on Track?: Pos  Next IDT Planned Review: 12/26/24           Next IDT Planned Review: 12/26/2024    Future Appointments   Date Time Provider Department Center   12/18/2024  1:15 PM Mac Crenshaw MD [facility]       SN Notes:   Diet: - Oral Diet:  General  Wounds:none  Medications: Other: facility  Other:    Post-acute CC Notes: IDT call and PCC Access    JEFF GARRIDO RN

## 2024-12-19 NOTE — PROGRESS NOTES
30 tablet 0    divalproex (DEPAKOTE SPRINKLE) 125 MG capsule Take 2 capsules by mouth 2 times daily      OLANZapine (ZYPREXA) 5 MG tablet Take 0.5 tablets by mouth at bedtime 12/18/2024- SNF order 12/13 decrease      citalopram (CELEXA) 20 MG tablet Take 1 tablet by mouth daily      Vitamin D (CHOLECALCIFEROL) 1000 UNITS CAPS capsule Take 1 capsule by mouth daily       No current facility-administered medications on file prior to visit.       Pertinent items are noted in HPI  Review of systems reviewed from Patient History Form and available in the patient's chart under the Media tab.        PHYSICAL EXAMINATION:  Mr. Payne is a very pleasant 84 y.o.  male who presents today in no acute distress, awake, alert, and oriented.  He is well dressed, nourished and  groomed.  Patient with normal affect.  Height is  1.753 m (5' 9\"), weight is 93.9 kg (207 lb), Body mass index is 30.57 kg/m².  Resting respiratory rate is 16.       On evaluation of his bilateral upper extremity, there is no obvious deformity left shoulder.  There is minimal swelling and moderate ecchymosis.  He is tender to palpation over the shoulder, and otherwise non tender over the remainder of the extremity.  Range of motion is decreased secondary to pain over the left shoulder.  The skin overlying the left shoulder is intact without evidence of lesion, laceration.  Distal pulses are 2+ and symmetric bilaterally.  Sensation is grossly intact to light touch and symmetric bilaterally.    IMAGING:  Xrays dated today in office, 3 views of left shoulder were reviewed, and showed moderately displaced proximal humerus fracture.    IMPRESSION:  Left moderately displaced proximal humerus fracture.    PLAN:  I discussed that the overall alignment of this fracture is good and that we can try to treat this non-operatively in a sling left shoulder, with no heavy impact activities, and gentle ROM.  We discussed the risk of nonunion and or malunion.  We

## 2024-12-30 PROBLEM — R79.89 ELEVATED TROPONIN: Status: RESOLVED | Noted: 2024-11-30 | Resolved: 2024-12-30

## 2024-12-31 ENCOUNTER — CARE COORDINATION (OUTPATIENT)
Dept: CARE COORDINATION | Age: 84
End: 2024-12-31

## 2024-12-31 NOTE — CARE COORDINATION
Care Transitions Post-Acute Facility Discharge Call    2024    Patient: Aki Payne Patient : 1940   MRN: [id number]  Reason for Admission: frequent falls, closed displaced fracture of surgical neck of left humerus, debility, syncope, acute metabolic encephalopathy      Discharge Date: 12/3/24 RARS: Readmission Risk Score: 13.7       Post Acute Facility Update    Care Transitions Post Acute Facility Update    Care Transitions Interventions      Post Acute Facility Update   Post Acute Facility: [facility]    ELOS: 19 days      Nursing   Prescribed diet: regular with Ensure plus bid    Nutrition intake assessment: poor   Wounds: Neg   Disease specific clinical considerations: CAD, HLD, dementia/ Alzheimer's, urostomy RLQ   Reported Nursing Updates: 189#, 119/73, 98.2, 79 bpm, 20, 95% on room air, pain 0/10. Urostomy continue     LOS charting  Current LOS:  29  on     ELOS:  16-19  Anticipated DOD:   3DW admission?   no  PLOF: home with wife   Goals of care:  return home   Wounds/ treatment/ stage: no  Labs? no   Disease specific clinical considerations: CAD, HLD, dementia/ Alzheimer's, urostomy RLQ  Palliative/ hospice referral? no  Does caregiver need any support/ have needs?   Anticipated DC dispo/ services/ date: DC  home with home care w/ [facility]   DME/ equipment needed at DC? no   Barriers to transition? Weakness/ cognition/ NWB LUE  Care progression on track w/ ELOS? Over on days  Interventions if progression not on track? Therapies have continued   Care conference- complete   Appointments? - ortho            Rehab/Functional   Prior Level of Functioning: home with wife assist ADL/ iADL   Cognitive function assessment: BIMS 0/15- severe cog impairment   ADLs: Maximum Assistance   Bed Mobility: Maximum Assistance   Transfer Assistance: Maximum Assistance   Ambulation Assistance: Independent, Stand by Assist - Presence and Cueing   How far (in feet) is the patient

## 2025-01-08 ENCOUNTER — CARE COORDINATION (OUTPATIENT)
Dept: CARE COORDINATION | Age: 85
End: 2025-01-08

## 2025-01-08 NOTE — CARE COORDINATION
Care Transitions Post-Acute Facility Discharge Call    2025    Patient: Aki Payne Patient : 1940   MRN: [id number]  Reason for Admission: frequent falls, closed displaced fracture of surgical neck of left humerus, debility, syncope, acute metabolic encephalopathy     Discharge Date: 12/3/24 RARS: Readmission Risk Score: 13.7       Post Acute Facility Update    Care Transitions Post Acute Facility Update    Care Transitions Interventions      Post Acute Facility Update   Post Acute Facility: [facility]    ELOS: 19 days      Nursing   Prescribed diet: regular w/ ensure plus bid    Disease specific clinical considerations: CAD, HLD, dementia/ Alzheimer's, urostomy RLQ   Reported Nursing Updates: 190.2#, 133/70, 97.9, 81 bpm, 18, 98% on room air, pain 0/10    LOS charting  Current LOS:  38      ELOS:  16-19  Anticipated DOD:   3DW admission?   no  PLOF: home with wife   Goals of care:   return home   Wounds/ treatment/ stage: no  Labs? no   Disease specific clinical considerations: CAD, HLD, dementia/ Alzheimer's, urostomy RLQ  Palliative/ hospice referral? Not at this time   Does caregiver need any support/ have needs? no  Anticipated DC dispo/ services/ date: DC today with [facility]   DME/ equipment needed at DC? no  Barriers to transition? None now   Care progression on track w/ ELOS? Over on days   Interventions if progression not on track? Therapies had continued   Care conference- complete   Appointments? Ortho            Rehab/Functional   Prior Level of Functioning: home with wife assist ADL/ iADL   Cognitive function assessment: BIMS 0/15- severe cog impairment   ADLs: Independent   Bed Mobility: Independent   Transfer Assistance: Stand by Assist - Presence and Cueing   Ambulation Assistance: Stand by Assist - Presence and Cueing   How far (in feet) is the patient ambulating?: 400   Does patient use an assistive device?: No       SW/Discharge Planning   Anticipated date for

## 2025-01-09 ENCOUNTER — CARE COORDINATION (OUTPATIENT)
Dept: CASE MANAGEMENT | Age: 85
End: 2025-01-09

## 2025-01-09 DIAGNOSIS — S42.212A CLOSED DISPLACED FRACTURE OF SURGICAL NECK OF LEFT HUMERUS, UNSPECIFIED FRACTURE MORPHOLOGY, INITIAL ENCOUNTER: Primary | ICD-10-CM

## 2025-01-09 PROCEDURE — 1111F DSCHRG MED/CURRENT MED MERGE: CPT | Performed by: INTERNAL MEDICINE

## 2025-01-09 NOTE — CARE COORDINATION
medications. Left my contact information. Jess expects pt's dementia to offer challenges, but would prefer him to be home and son is available to help. Left my contact information.     Entered [facility] from [facility]  into [employer]    Post Acute Care Manager reviewed red flags with spouse/partner. The spouse/partner was given an opportunity to ask questions; all questions answered at this time.. The spouse/partner verbalized understanding.   Were discharge instructions available to patient? Yes.   Reviewed appropriate site of care based on symptoms and resources available to patient including: PCP  Specialist. The spouse/partner agrees to contact the primary care provider and/or specialist office for questions related to their healthcare.      Advance Care Planning:   Does patient have an Advance Directive: reviewed and current.    Medication Reconciliation:  Medication reconciliation was performed with spouse/partner,1111F entered: yes.     Remote Patient Monitoring:  Offered patient enrollment in the Remote Patient Monitoring (RPM) program for in-home monitoring: Deferred at this time because [facility] to determine; will discuss at next outreach.    Assessments:  Discharge Assessment    Follow Up Appointment:   Discussed follow up appointments. Patient has hospital follow up appointment scheduled  Keeping appt with ortho    Future Appointments         Provider Specialty Dept Phone    1/29/2025 1:30 PM Melanie Hanna, APRN - CNP Orthopedic Surgery [phone]            Post Acute Care Manager provided contact information.  Patient referred back to [facility] Hamida Cooper for continued management. based on severity of symptoms and risk factors.  Plan for next call: referral to ambulatory care manager-ESTEPHANIA OlmedoN, RN   [facility] Post Acute Care Coordinator  [phone]

## 2025-01-12 ENCOUNTER — APPOINTMENT (OUTPATIENT)
Dept: CT IMAGING | Age: 85
DRG: 699 | End: 2025-01-12
Payer: MEDICARE

## 2025-01-12 ENCOUNTER — HOSPITAL ENCOUNTER (INPATIENT)
Age: 85
LOS: 3 days | Discharge: HOME HEALTH | DRG: 699 | End: 2025-01-15
Attending: INTERNAL MEDICINE | Admitting: INTERNAL MEDICINE
Payer: MEDICARE

## 2025-01-12 ENCOUNTER — APPOINTMENT (OUTPATIENT)
Dept: GENERAL RADIOLOGY | Age: 85
DRG: 699 | End: 2025-01-12
Payer: MEDICARE

## 2025-01-12 DIAGNOSIS — R79.89 ELEVATED LACTIC ACID LEVEL: ICD-10-CM

## 2025-01-12 DIAGNOSIS — Z16.24 MULTIPLE DRUG RESISTANT ORGANISM (MDRO) CULTURE POSITIVE: ICD-10-CM

## 2025-01-12 DIAGNOSIS — R41.82 ALTERED MENTAL STATUS, UNSPECIFIED ALTERED MENTAL STATUS TYPE: ICD-10-CM

## 2025-01-12 DIAGNOSIS — R79.89 ELEVATED TROPONIN: ICD-10-CM

## 2025-01-12 DIAGNOSIS — N39.0 ACUTE URINARY TRACT INFECTION: Primary | ICD-10-CM

## 2025-01-12 PROBLEM — E83.52 HYPERCALCEMIA: Status: ACTIVE | Noted: 2025-01-12

## 2025-01-12 LAB
ALBUMIN SERPL-MCNC: 3.7 G/DL (ref 3.4–5)
ALBUMIN/GLOB SERPL: 1.1 {RATIO} (ref 1.1–2.2)
ALP SERPL-CCNC: 89 U/L (ref 40–129)
ALT SERPL-CCNC: 19 U/L (ref 10–40)
ANION GAP SERPL CALCULATED.3IONS-SCNC: 17 MMOL/L (ref 3–16)
AST SERPL-CCNC: 24 U/L (ref 15–37)
BACTERIA URNS QL MICRO: ABNORMAL /HPF
BASOPHILS # BLD: 0 K/UL (ref 0–0.2)
BASOPHILS NFR BLD: 0.5 %
BILIRUB SERPL-MCNC: 1.3 MG/DL (ref 0–1)
BILIRUB UR QL STRIP.AUTO: NEGATIVE
BUN SERPL-MCNC: 21 MG/DL (ref 7–20)
CALCIUM SERPL-MCNC: 10.9 MG/DL (ref 8.3–10.6)
CHLORIDE SERPL-SCNC: 107 MMOL/L (ref 99–110)
CLARITY UR: ABNORMAL
CO2 SERPL-SCNC: 19 MMOL/L (ref 21–32)
COLOR UR: YELLOW
CREAT SERPL-MCNC: 1.1 MG/DL (ref 0.8–1.3)
DEPRECATED RDW RBC AUTO: 14.9 % (ref 12.4–15.4)
EOSINOPHIL # BLD: 0.1 K/UL (ref 0–0.6)
EOSINOPHIL NFR BLD: 0.8 %
EPI CELLS #/AREA URNS HPF: ABNORMAL /HPF (ref 0–5)
GFR SERPLBLD CREATININE-BSD FMLA CKD-EPI: 66 ML/MIN/{1.73_M2}
GLUCOSE SERPL-MCNC: 147 MG/DL (ref 70–99)
GLUCOSE UR STRIP.AUTO-MCNC: NEGATIVE MG/DL
HCT VFR BLD AUTO: 37.4 % (ref 40.5–52.5)
HGB BLD-MCNC: 12.4 G/DL (ref 13.5–17.5)
HGB UR QL STRIP.AUTO: ABNORMAL
KETONES UR STRIP.AUTO-MCNC: ABNORMAL MG/DL
LACTATE BLDV-SCNC: 1.6 MMOL/L (ref 0.4–2)
LACTATE BLDV-SCNC: 2.9 MMOL/L (ref 0.4–1.9)
LACTATE BLDV-SCNC: 3.1 MMOL/L (ref 0.4–2)
LEUKOCYTE ESTERASE UR QL STRIP.AUTO: ABNORMAL
LYMPHOCYTES # BLD: 1.7 K/UL (ref 1–5.1)
LYMPHOCYTES NFR BLD: 17.9 %
MAGNESIUM SERPL-MCNC: 1.64 MG/DL (ref 1.8–2.4)
MCH RBC QN AUTO: 28.8 PG (ref 26–34)
MCHC RBC AUTO-ENTMCNC: 33.3 G/DL (ref 31–36)
MCV RBC AUTO: 86.6 FL (ref 80–100)
MONOCYTES # BLD: 0.7 K/UL (ref 0–1.3)
MONOCYTES NFR BLD: 6.7 %
NEUTROPHILS # BLD: 7.2 K/UL (ref 1.7–7.7)
NEUTROPHILS NFR BLD: 74.1 %
NITRITE UR QL STRIP.AUTO: NEGATIVE
PH UR STRIP.AUTO: 8.5 [PH] (ref 5–8)
PLATELET # BLD AUTO: 332 K/UL (ref 135–450)
PMV BLD AUTO: 7.2 FL (ref 5–10.5)
POTASSIUM SERPL-SCNC: 3.5 MMOL/L (ref 3.5–5.1)
PROT SERPL-MCNC: 7.1 G/DL (ref 6.4–8.2)
PROT UR STRIP.AUTO-MCNC: 100 MG/DL
RBC # BLD AUTO: 4.31 M/UL (ref 4.2–5.9)
RBC #/AREA URNS HPF: ABNORMAL /HPF (ref 0–4)
SODIUM SERPL-SCNC: 143 MMOL/L (ref 136–145)
SP GR UR STRIP.AUTO: 1.02 (ref 1–1.03)
TROPONIN, HIGH SENSITIVITY: 54 NG/L (ref 0–22)
UA DIPSTICK W REFLEX MICRO PNL UR: YES
URN SPEC COLLECT METH UR: ABNORMAL
UROBILINOGEN UR STRIP-ACNC: 4 E.U./DL
WBC # BLD AUTO: 9.7 K/UL (ref 4–11)
WBC #/AREA URNS HPF: ABNORMAL /HPF (ref 0–5)

## 2025-01-12 PROCEDURE — 70450 CT HEAD/BRAIN W/O DYE: CPT

## 2025-01-12 PROCEDURE — 81001 URINALYSIS AUTO W/SCOPE: CPT

## 2025-01-12 PROCEDURE — 83605 ASSAY OF LACTIC ACID: CPT

## 2025-01-12 PROCEDURE — 87040 BLOOD CULTURE FOR BACTERIA: CPT

## 2025-01-12 PROCEDURE — 36415 COLL VENOUS BLD VENIPUNCTURE: CPT

## 2025-01-12 PROCEDURE — 6360000002 HC RX W HCPCS: Performed by: PHYSICIAN ASSISTANT

## 2025-01-12 PROCEDURE — 71045 X-RAY EXAM CHEST 1 VIEW: CPT

## 2025-01-12 PROCEDURE — 2500000003 HC RX 250 WO HCPCS: Performed by: STUDENT IN AN ORGANIZED HEALTH CARE EDUCATION/TRAINING PROGRAM

## 2025-01-12 PROCEDURE — 84484 ASSAY OF TROPONIN QUANT: CPT

## 2025-01-12 PROCEDURE — 1200000000 HC SEMI PRIVATE

## 2025-01-12 PROCEDURE — 93005 ELECTROCARDIOGRAM TRACING: CPT | Performed by: PHYSICIAN ASSISTANT

## 2025-01-12 PROCEDURE — 71046 X-RAY EXAM CHEST 2 VIEWS: CPT

## 2025-01-12 PROCEDURE — 96365 THER/PROPH/DIAG IV INF INIT: CPT

## 2025-01-12 PROCEDURE — 2580000003 HC RX 258: Performed by: PHYSICIAN ASSISTANT

## 2025-01-12 PROCEDURE — 6370000000 HC RX 637 (ALT 250 FOR IP): Performed by: PHYSICIAN ASSISTANT

## 2025-01-12 PROCEDURE — 2580000003 HC RX 258: Performed by: STUDENT IN AN ORGANIZED HEALTH CARE EDUCATION/TRAINING PROGRAM

## 2025-01-12 PROCEDURE — 99285 EMERGENCY DEPT VISIT HI MDM: CPT

## 2025-01-12 PROCEDURE — 85025 COMPLETE CBC W/AUTO DIFF WBC: CPT

## 2025-01-12 PROCEDURE — 80053 COMPREHEN METABOLIC PANEL: CPT

## 2025-01-12 PROCEDURE — 83735 ASSAY OF MAGNESIUM: CPT

## 2025-01-12 PROCEDURE — 93005 ELECTROCARDIOGRAM TRACING: CPT | Performed by: INTERNAL MEDICINE

## 2025-01-12 RX ORDER — DIVALPROEX SODIUM 125 MG/1
250 CAPSULE, COATED PELLETS ORAL 2 TIMES DAILY
Status: CANCELLED | OUTPATIENT
Start: 2025-01-12

## 2025-01-12 RX ORDER — SODIUM CHLORIDE 9 MG/ML
INJECTION, SOLUTION INTRAVENOUS PRN
Status: DISCONTINUED | OUTPATIENT
Start: 2025-01-12 | End: 2025-01-15 | Stop reason: HOSPADM

## 2025-01-12 RX ORDER — SODIUM CHLORIDE 0.9 % (FLUSH) 0.9 %
5-40 SYRINGE (ML) INJECTION EVERY 12 HOURS SCHEDULED
Status: DISCONTINUED | OUTPATIENT
Start: 2025-01-12 | End: 2025-01-15 | Stop reason: HOSPADM

## 2025-01-12 RX ORDER — OLANZAPINE 2.5 MG/1
2.5 TABLET, FILM COATED ORAL NIGHTLY
Status: CANCELLED | OUTPATIENT
Start: 2025-01-12

## 2025-01-12 RX ORDER — POLYETHYLENE GLYCOL 3350 17 G/17G
17 POWDER, FOR SOLUTION ORAL DAILY PRN
Status: DISCONTINUED | OUTPATIENT
Start: 2025-01-12 | End: 2025-01-15 | Stop reason: HOSPADM

## 2025-01-12 RX ORDER — ASPIRIN 81 MG/1
81 TABLET ORAL DAILY
Status: CANCELLED | OUTPATIENT
Start: 2025-01-12

## 2025-01-12 RX ORDER — SODIUM CHLORIDE 0.9 % (FLUSH) 0.9 %
5-40 SYRINGE (ML) INJECTION PRN
Status: DISCONTINUED | OUTPATIENT
Start: 2025-01-12 | End: 2025-01-15 | Stop reason: HOSPADM

## 2025-01-12 RX ORDER — SODIUM CHLORIDE 9 MG/ML
INJECTION, SOLUTION INTRAVENOUS CONTINUOUS
Status: ACTIVE | OUTPATIENT
Start: 2025-01-12 | End: 2025-01-13

## 2025-01-12 RX ORDER — ONDANSETRON 2 MG/ML
4 INJECTION INTRAMUSCULAR; INTRAVENOUS EVERY 6 HOURS PRN
Status: DISCONTINUED | OUTPATIENT
Start: 2025-01-12 | End: 2025-01-15 | Stop reason: HOSPADM

## 2025-01-12 RX ORDER — ACETAMINOPHEN 325 MG/1
650 TABLET ORAL EVERY 6 HOURS PRN
Status: DISCONTINUED | OUTPATIENT
Start: 2025-01-12 | End: 2025-01-15 | Stop reason: HOSPADM

## 2025-01-12 RX ORDER — CITALOPRAM HYDROBROMIDE 10 MG/1
20 TABLET ORAL DAILY
Status: CANCELLED | OUTPATIENT
Start: 2025-01-12

## 2025-01-12 RX ORDER — ONDANSETRON 4 MG/1
4 TABLET, ORALLY DISINTEGRATING ORAL EVERY 8 HOURS PRN
Status: DISCONTINUED | OUTPATIENT
Start: 2025-01-12 | End: 2025-01-15 | Stop reason: HOSPADM

## 2025-01-12 RX ORDER — DIVALPROEX SODIUM 125 MG/1
125 CAPSULE, COATED PELLETS ORAL ONCE
Status: COMPLETED | OUTPATIENT
Start: 2025-01-12 | End: 2025-01-12

## 2025-01-12 RX ORDER — ACETAMINOPHEN 650 MG/1
650 SUPPOSITORY RECTAL EVERY 6 HOURS PRN
Status: DISCONTINUED | OUTPATIENT
Start: 2025-01-12 | End: 2025-01-15 | Stop reason: HOSPADM

## 2025-01-12 RX ORDER — OLANZAPINE 2.5 MG/1
2.5 TABLET, FILM COATED ORAL ONCE
Status: COMPLETED | OUTPATIENT
Start: 2025-01-12 | End: 2025-01-12

## 2025-01-12 RX ORDER — ENOXAPARIN SODIUM 100 MG/ML
40 INJECTION SUBCUTANEOUS DAILY
Status: DISCONTINUED | OUTPATIENT
Start: 2025-01-13 | End: 2025-01-15 | Stop reason: HOSPADM

## 2025-01-12 RX ADMIN — DIVALPROEX SODIUM 125 MG: 125 CAPSULE ORAL at 17:23

## 2025-01-12 RX ADMIN — PIPERACILLIN AND TAZOBACTAM 4500 MG: 4; .5 INJECTION, POWDER, FOR SOLUTION INTRAVENOUS at 17:46

## 2025-01-12 RX ADMIN — SODIUM CHLORIDE 1000 ML: 9 INJECTION, SOLUTION INTRAVENOUS at 22:17

## 2025-01-12 RX ADMIN — OLANZAPINE 2.5 MG: 2.5 TABLET, FILM COATED ORAL at 17:23

## 2025-01-12 RX ADMIN — SODIUM CHLORIDE, PRESERVATIVE FREE 10 ML: 5 INJECTION INTRAVENOUS at 22:18

## 2025-01-12 ASSESSMENT — PAIN - FUNCTIONAL ASSESSMENT: PAIN_FUNCTIONAL_ASSESSMENT: PAIN ASSESSMENT IN ADVANCED DEMENTIA (PAINAD)

## 2025-01-12 ASSESSMENT — PAIN SCALES - PAIN ASSESSMENT IN ADVANCED DEMENTIA (PAINAD)
CONSOLABILITY: DISTRACTED OR REASSURED BY VOICE/TOUCH
BREATHING: NOISY LABORED BREATHING, LONG PERIODS HYPERVENTILATION, CHEYNE-STOKES RESPIRATIONS
NEGVOCALIZATION: OCCASIONAL MOAN/GROAN, LOW SPEECH, NEGATIVE/DISAPPROVING QUALITY
FACIALEXPRESSION: SAD, FRIGHTENED, FROWN
BODYLANGUAGE: TENSE, DISTRESSED PACING, FIDGETING
TOTALSCORE: 6

## 2025-01-12 NOTE — ED TRIAGE NOTES
.Aki Payne is a 84 y.o. male was brought by wife for eval of UTI, the patient has dementia and has been acting more anxious and pacing. The patient has a positive UTI at the nursing home. The patient is at the nursing home for a broken left arm for rehab. The patient has an open airway with an increased respiratory effort.

## 2025-01-12 NOTE — PROGRESS NOTES
Medication Reconciliation     List of medications patient is currently taking is not complete.     Source of information: 1. Conversation with patient at bedside                                      2. EPIC records      Allergies  Aricept [donepezil hcl] and Namenda [memantine hcl]     Notes regarding home medications:  1. Patient is altered- unable to determine when medications were last taken  2. Med list updated based on pt chart  3. Pt's PCP told pt wife to increase olanzapine to BID on 1/10    Radha Zapata, Pharmacy Intern  1/12/2025 6:51 PM     Addendum   Conversation with pt's wife at bedside.  Added back aspirin 81 mg daily.  Added Melatonin 10-20 mg HS as wife states she does at home   Mariela Montez [facility]

## 2025-01-12 NOTE — ED PROVIDER NOTES
[facility] EMERGENCY DEPARTMENT  [address] [address]  Dept: [phone]  Loc: [phone]    EMERGENCY DEPARTMENT ENCOUNTER        This patient was not seen or evaluated by the attending physician.    I evaluated this patient, the attending physician was available for consultation.    CHIEF COMPLAINT    Chief Complaint   Patient presents with    Urinary Tract Infection     Patient was sent from the nursing home for a UTI, the patient has dementia but seems more confused        HPI    Aki Payne is a 84 y.o. male with history of dementia who presents with altered mental status.  Onset was yesterday.  The duration has been constant since the onset.  The context was spontaneous onset.  Per the wife who is at the bedside, the patient has been more confused for the past day, she states that the patient is seeing things that are not there.  [facility].  Urinalysis was obtained at that time and the results came back yesterday, Klebsiella and Enterococcus organisms were detected.  Resistant to multiple drugs, sensitive to Augmentin only.  Additional history unable to be obtained due to the fact that the patient has decreased level of consciousness.    REVIEW OF SYSTEMS    Review of systems unable to be obtained due to AMS.    PAST MEDICAL & SURGICAL HISTORY    Past Medical History:   Diagnosis Date    Bladder cancer (HCC) 1992    Dr Carrasquillo    Blood in urine 09/2022    CAD (coronary artery disease) 07/2010    cardiac cath- non obstructive    Colon polyps 2006    by colonoscopy    Dementia (HCC) 05/2017    DNR (do not resuscitate)     Hyperlipidemia      Past Surgical History:   Procedure Laterality Date    BLADDER REMOVAL  1992    with ileal conduit    COLONOSCOPY  12/2010    Due in 2015    COLONOSCOPY  10/05/2016    Mel[facility]    CYSTOSCOPY N/A 10/4/2022    ENDOSCOPIC EXPLORATION OF THE

## 2025-01-13 PROBLEM — R09.02 HYPOXIA: Status: RESOLVED | Noted: 2022-10-04 | Resolved: 2025-01-13

## 2025-01-13 PROBLEM — B99.9 ENCEPHALOPATHY DUE TO INFECTION: Status: ACTIVE | Noted: 2025-01-13

## 2025-01-13 PROBLEM — A41.9 SEPSIS (HCC): Status: RESOLVED | Noted: 2022-10-07 | Resolved: 2025-01-13

## 2025-01-13 PROBLEM — R94.31 ST SEGMENT DEPRESSION: Status: RESOLVED | Noted: 2024-11-30 | Resolved: 2025-01-13

## 2025-01-13 PROBLEM — S42.212A CLOSED DISPLACED FRACTURE OF SURGICAL NECK OF LEFT HUMERUS: Status: RESOLVED | Noted: 2024-11-30 | Resolved: 2025-01-13

## 2025-01-13 PROBLEM — N30.00 ACUTE CYSTITIS: Status: RESOLVED | Noted: 2020-04-16 | Resolved: 2025-01-13

## 2025-01-13 PROBLEM — Z86.59 HISTORY OF DEMENTIA: Status: RESOLVED | Noted: 2024-11-30 | Resolved: 2025-01-13

## 2025-01-13 PROBLEM — R55 SYNCOPE: Status: RESOLVED | Noted: 2024-11-30 | Resolved: 2025-01-13

## 2025-01-13 PROBLEM — G93.41 ACUTE METABOLIC ENCEPHALOPATHY: Status: RESOLVED | Noted: 2024-11-30 | Resolved: 2025-01-13

## 2025-01-13 PROBLEM — R53.1 GENERAL WEAKNESS: Status: RESOLVED | Noted: 2024-11-30 | Resolved: 2025-01-13

## 2025-01-13 PROBLEM — G93.49 ENCEPHALOPATHY DUE TO INFECTION: Status: ACTIVE | Noted: 2025-01-13

## 2025-01-13 PROBLEM — N12 PYELONEPHRITIS: Status: RESOLVED | Noted: 2020-04-16 | Resolved: 2025-01-13

## 2025-01-13 PROBLEM — R29.6 UNWITNESSED FALL: Status: RESOLVED | Noted: 2024-11-30 | Resolved: 2025-01-13

## 2025-01-13 PROBLEM — S42.292A CLOSED FRACTURE OF HEAD OF LEFT HUMERUS, INITIAL ENCOUNTER: Status: RESOLVED | Noted: 2024-11-29 | Resolved: 2025-01-13

## 2025-01-13 LAB
25(OH)D3 SERPL-MCNC: 39.4 NG/ML
ALBUMIN SERPL-MCNC: 3.5 G/DL (ref 3.4–5)
ALBUMIN/GLOB SERPL: 1.2 {RATIO} (ref 1.1–2.2)
ALP SERPL-CCNC: 83 U/L (ref 40–129)
ALT SERPL-CCNC: 17 U/L (ref 10–40)
ANION GAP SERPL CALCULATED.3IONS-SCNC: 12 MMOL/L (ref 3–16)
AST SERPL-CCNC: 21 U/L (ref 15–37)
BASOPHILS # BLD: 0 K/UL (ref 0–0.2)
BASOPHILS NFR BLD: 0.4 %
BILIRUB SERPL-MCNC: 0.5 MG/DL (ref 0–1)
BUN SERPL-MCNC: 18 MG/DL (ref 7–20)
CALCIUM SERPL-MCNC: 9.9 MG/DL (ref 8.3–10.6)
CHLORIDE SERPL-SCNC: 107 MMOL/L (ref 99–110)
CO2 SERPL-SCNC: 24 MMOL/L (ref 21–32)
CREAT SERPL-MCNC: 0.9 MG/DL (ref 0.8–1.3)
DEPRECATED RDW RBC AUTO: 15 % (ref 12.4–15.4)
EKG ATRIAL RATE: 73 BPM
EKG ATRIAL RATE: 84 BPM
EKG DIAGNOSIS: NORMAL
EKG DIAGNOSIS: NORMAL
EKG P AXIS: 23 DEGREES
EKG P AXIS: 8 DEGREES
EKG P-R INTERVAL: 122 MS
EKG P-R INTERVAL: 134 MS
EKG Q-T INTERVAL: 398 MS
EKG Q-T INTERVAL: 454 MS
EKG QRS DURATION: 100 MS
EKG QRS DURATION: 96 MS
EKG QTC CALCULATION (BAZETT): 470 MS
EKG QTC CALCULATION (BAZETT): 500 MS
EKG R AXIS: -25 DEGREES
EKG R AXIS: -7 DEGREES
EKG T AXIS: -61 DEGREES
EKG T AXIS: 103 DEGREES
EKG VENTRICULAR RATE: 73 BPM
EKG VENTRICULAR RATE: 84 BPM
EOSINOPHIL # BLD: 0.2 K/UL (ref 0–0.6)
EOSINOPHIL NFR BLD: 2.6 %
GFR SERPLBLD CREATININE-BSD FMLA CKD-EPI: 84 ML/MIN/{1.73_M2}
GLUCOSE SERPL-MCNC: 94 MG/DL (ref 70–99)
HCT VFR BLD AUTO: 33.3 % (ref 40.5–52.5)
HGB BLD-MCNC: 11.1 G/DL (ref 13.5–17.5)
LYMPHOCYTES # BLD: 1.7 K/UL (ref 1–5.1)
LYMPHOCYTES NFR BLD: 22 %
MCH RBC QN AUTO: 29 PG (ref 26–34)
MCHC RBC AUTO-ENTMCNC: 33.4 G/DL (ref 31–36)
MCV RBC AUTO: 86.7 FL (ref 80–100)
MONOCYTES # BLD: 0.7 K/UL (ref 0–1.3)
MONOCYTES NFR BLD: 8.5 %
NEUTROPHILS # BLD: 5.1 K/UL (ref 1.7–7.7)
NEUTROPHILS NFR BLD: 66.5 %
PLATELET # BLD AUTO: 276 K/UL (ref 135–450)
PMV BLD AUTO: 7.1 FL (ref 5–10.5)
POTASSIUM SERPL-SCNC: 3.2 MMOL/L (ref 3.5–5.1)
PROT SERPL-MCNC: 6.5 G/DL (ref 6.4–8.2)
PTH-INTACT SERPL-MCNC: 87.1 PG/ML (ref 14–72)
RBC # BLD AUTO: 3.84 M/UL (ref 4.2–5.9)
SODIUM SERPL-SCNC: 143 MMOL/L (ref 136–145)
WBC # BLD AUTO: 7.7 K/UL (ref 4–11)

## 2025-01-13 PROCEDURE — 97165 OT EVAL LOW COMPLEX 30 MIN: CPT

## 2025-01-13 PROCEDURE — 82306 VITAMIN D 25 HYDROXY: CPT

## 2025-01-13 PROCEDURE — 6360000002 HC RX W HCPCS: Performed by: INTERNAL MEDICINE

## 2025-01-13 PROCEDURE — 94760 N-INVAS EAR/PLS OXIMETRY 1: CPT

## 2025-01-13 PROCEDURE — 36415 COLL VENOUS BLD VENIPUNCTURE: CPT

## 2025-01-13 PROCEDURE — 83970 ASSAY OF PARATHORMONE: CPT

## 2025-01-13 PROCEDURE — 85025 COMPLETE CBC W/AUTO DIFF WBC: CPT

## 2025-01-13 PROCEDURE — 1200000000 HC SEMI PRIVATE

## 2025-01-13 PROCEDURE — 6360000002 HC RX W HCPCS: Performed by: STUDENT IN AN ORGANIZED HEALTH CARE EDUCATION/TRAINING PROGRAM

## 2025-01-13 PROCEDURE — 6370000000 HC RX 637 (ALT 250 FOR IP): Performed by: INTERNAL MEDICINE

## 2025-01-13 PROCEDURE — 93010 ELECTROCARDIOGRAM REPORT: CPT | Performed by: INTERNAL MEDICINE

## 2025-01-13 PROCEDURE — 97530 THERAPEUTIC ACTIVITIES: CPT

## 2025-01-13 PROCEDURE — 99223 1ST HOSP IP/OBS HIGH 75: CPT | Performed by: INTERNAL MEDICINE

## 2025-01-13 PROCEDURE — 80053 COMPREHEN METABOLIC PANEL: CPT

## 2025-01-13 PROCEDURE — 97535 SELF CARE MNGMENT TRAINING: CPT

## 2025-01-13 PROCEDURE — 2500000003 HC RX 250 WO HCPCS: Performed by: STUDENT IN AN ORGANIZED HEALTH CARE EDUCATION/TRAINING PROGRAM

## 2025-01-13 PROCEDURE — 2580000003 HC RX 258: Performed by: STUDENT IN AN ORGANIZED HEALTH CARE EDUCATION/TRAINING PROGRAM

## 2025-01-13 PROCEDURE — 97161 PT EVAL LOW COMPLEX 20 MIN: CPT

## 2025-01-13 PROCEDURE — 2580000003 HC RX 258: Performed by: INTERNAL MEDICINE

## 2025-01-13 RX ORDER — OLANZAPINE 5 MG/1
2.5 TABLET ORAL EVERY 6 HOURS PRN
Status: DISCONTINUED | OUTPATIENT
Start: 2025-01-13 | End: 2025-01-15 | Stop reason: HOSPADM

## 2025-01-13 RX ORDER — MAGNESIUM SULFATE IN WATER 40 MG/ML
2000 INJECTION, SOLUTION INTRAVENOUS ONCE
Status: COMPLETED | OUTPATIENT
Start: 2025-01-13 | End: 2025-01-13

## 2025-01-13 RX ADMIN — SODIUM CHLORIDE 3000 MG: 900 INJECTION INTRAVENOUS at 09:34

## 2025-01-13 RX ADMIN — SODIUM CHLORIDE, PRESERVATIVE FREE 10 ML: 5 INJECTION INTRAVENOUS at 09:35

## 2025-01-13 RX ADMIN — SODIUM CHLORIDE, PRESERVATIVE FREE 10 ML: 5 INJECTION INTRAVENOUS at 20:45

## 2025-01-13 RX ADMIN — OLANZAPINE 2.5 MG: 5 TABLET, FILM COATED ORAL at 22:53

## 2025-01-13 RX ADMIN — SODIUM CHLORIDE 3000 MG: 900 INJECTION INTRAVENOUS at 15:05

## 2025-01-13 RX ADMIN — MAGNESIUM SULFATE HEPTAHYDRATE 2000 MG: 40 INJECTION, SOLUTION INTRAVENOUS at 10:58

## 2025-01-13 RX ADMIN — PIPERACILLIN AND TAZOBACTAM 3375 MG: 3; .375 INJECTION, POWDER, LYOPHILIZED, FOR SOLUTION INTRAVENOUS at 00:32

## 2025-01-13 RX ADMIN — OLANZAPINE 2.5 MG: 5 TABLET, FILM COATED ORAL at 15:20

## 2025-01-13 RX ADMIN — SODIUM CHLORIDE 3000 MG: 900 INJECTION INTRAVENOUS at 20:49

## 2025-01-13 RX ADMIN — ENOXAPARIN SODIUM 40 MG: 100 INJECTION SUBCUTANEOUS at 09:31

## 2025-01-13 RX ADMIN — POTASSIUM BICARBONATE 40 MEQ: 782 TABLET, EFFERVESCENT ORAL at 09:31

## 2025-01-13 ASSESSMENT — PAIN SCALES - WONG BAKER: WONGBAKER_NUMERICALRESPONSE: NO HURT

## 2025-01-13 NOTE — H&P
[facility]  History and Physical      CHIEF COMPLAINT:  The patient is non-verbal    History of Present Illness: This is an 84-year-old white male with a history of advanced dementia and recently was hospitalized with a left humerus fracture after a fall during Thanksgiving week who was discharged from [facility] last Wednesday.  Patient has a history of an ileal conduit and a urine culture was obtained the day the patient left [facility].  The urine culture grew Enterococcus and Klebsiella.  This is sensitive to Augmentin and penicillin.  The patient was not being treated and the weekend developed worsened mental state, acutely worse confusion.  The patient is very advanced with his dementia but became much worse from a behavior standpoint over the weekend.  The patient was directed to the emergency room for evaluation and has been admitted for treatment of this urinary tract infection.    The wife had a copy of the urinary culture results and this was reviewed this morning.   The patient is unable to provide any history.  She said the patient was doing well at home for 2 days prior to his acute decline in his mental status.  Since admission, she does report that he is improved.  She reports that he has not been eating or drinking well the last couple of days also.      Review of systems is limited by the patient's lack of ability to answer questions.  The wife reports that he has not had any evident shortness of breath.  There is been no fever.  No rigors have been noted.  She does report that he started with a cough yesterday but typically has not been having any coughing and eating/swallowing has been without coughing as well.        Past Medical History:   Diagnosis Date    Bladder cancer ([facility]) 1992    Dr Carrasquillo    Blood in urine 09/2022    CAD (coronary artery disease) 07/2010    cardiac cath- non obstructive    Colon polyps 2006    by colonoscopy    Dementia ([facility]) 05/2017    DNR  0.8 - 1.3 mg/dL    Est, Glom Filt Rate 66 >60    Calcium 10.9 (H) 8.3 - 10.6 mg/dL    Total Protein 7.1 6.4 - 8.2 g/dL    Albumin 3.7 3.4 - 5.0 g/dL    Albumin/Globulin Ratio 1.1 1.1 - 2.2    Total Bilirubin 1.3 (H) 0.0 - 1.0 mg/dL    Alkaline Phosphatase 89 40 - 129 U/L    ALT 19 10 - 40 U/L    AST 24 15 - 37 U/L   Lactic Acid    Collection Time: 01/12/25  4:29 PM   Result Value Ref Range    Lactic Acid 3.1 (H) 0.4 - 2.0 mmol/L   Magnesium    Collection Time: 01/12/25  4:29 PM   Result Value Ref Range    Magnesium 1.64 (L) 1.80 - 2.40 mg/dL   Troponin    Collection Time: 01/12/25  4:29 PM   Result Value Ref Range    Troponin, High Sensitivity 54 (H) 0 - 22 ng/L   EKG 12 lead    Collection Time: 01/12/25  4:59 PM   Result Value Ref Range    Ventricular Rate 84 BPM    Atrial Rate 84 BPM    P-R Interval 134 ms    QRS Duration 96 ms    Q-T Interval 398 ms    QTc Calculation (Bazett) 470 ms    P Axis 23 degrees    R Axis -7 degrees    T Axis -61 degrees    Diagnosis       Normal sinus rhythmST & T wave abnormality, consider lateral ischemiaAbnormal ECGWhen compared with ECG of 29-NOV-2024 16:56,Nonspecific T wave abnormality, worse in Inferior leads   Urinalysis with Microscopic    Collection Time: 01/12/25  5:31 PM   Result Value Ref Range    Color, UA Yellow Straw/Yellow    Clarity, UA CLOUDY (A) Clear    Glucose, Ur Negative Negative mg/dL    Bilirubin, Urine Negative Negative    Ketones, Urine TRACE (A) Negative mg/dL    Specific Gravity, UA 1.020 1.005 - 1.030    Blood, Urine LARGE (A) Negative    pH, Urine 8.5 (A) 5.0 - 8.0    Protein,  (A) Negative mg/dL    Urobilinogen, Urine 4.0 (A) <2.0 E.U./dL    Nitrite, Urine Negative Negative    Leukocyte Esterase, Urine MODERATE (A) Negative    Microscopic Examination YES     Urine Type NotGiven     WBC, UA  (A) 0 - 5 /HPF    RBC, UA 3-4 0 - 4 /HPF    Epithelial Cells, UA 0-1 0 - 5 /HPF    Bacteria, UA Rare (A) None Seen /HPF   Lactate, Sepsis    Collection

## 2025-01-13 NOTE — ED NOTES
ED TO INPATIENT SBAR HANDOFF    Patient Name: Aki Payne   Preferred Name: Aki  : 1940  84 y.o.   Family/Caregiver Present: no   Code Status Order: DNR-CC  PO Status: NPO:No  Telemetry Order:   C-SSRS: Risk of Suicide: No Risk  Sitter no   Restraints:     Sepsis Risk Score      Situation  Chief Complaint   Patient presents with    Urinary Tract Infection     Patient was sent from the nursing home for a UTI, the patient has dementia but seems more confused      Brief Description of Patient's Condition:   Mental Status: disoriented  Arrived from:Home  Imaging:   CT Head WO Contrast   Final Result   No acute intracranial abnormality.         XR CHEST (2 VW)   Final Result   No acute process.           Abnormal labs:   Abnormal Labs Reviewed   CBC WITH AUTO DIFFERENTIAL - Abnormal; Notable for the following components:       Result Value    Hemoglobin 12.4 (*)     Hematocrit 37.4 (*)     All other components within normal limits   COMPREHENSIVE METABOLIC PANEL W/ REFLEX TO MG FOR LOW K - Abnormal; Notable for the following components:    CO2 19 (*)     Anion Gap 17 (*)     Glucose 147 (*)     BUN 21 (*)     Calcium 10.9 (*)     Total Bilirubin 1.3 (*)     All other components within normal limits   URINALYSIS WITH MICROSCOPIC - Abnormal; Notable for the following components:    Clarity, UA CLOUDY (*)     Ketones, Urine TRACE (*)     Blood, Urine LARGE (*)     pH, Urine 8.5 (*)     Protein,  (*)     Urobilinogen, Urine 4.0 (*)     Leukocyte Esterase, Urine MODERATE (*)     WBC, UA  (*)     Bacteria, UA Rare (*)     All other components within normal limits   LACTIC ACID - Abnormal; Notable for the following components:    Lactic Acid 3.1 (*)     All other components within normal limits   MAGNESIUM - Abnormal; Notable for the following components:    Magnesium 1.64 (*)     All other components within normal limits   LACTATE, SEPSIS - Abnormal; Notable for the following components:    Lactic  (ZOFRAN-ODT) disintegrating tablet 4 mg (has no administration in time range)     Or   ondansetron (ZOFRAN) injection 4 mg (has no administration in time range)   acetaminophen (TYLENOL) tablet 650 mg (has no administration in time range)     Or   acetaminophen (TYLENOL) suppository 650 mg (has no administration in time range)   polyethylene glycol (GLYCOLAX) packet 17 g (has no administration in time range)   piperacillin-tazobactam (ZOSYN) 4,500 mg in sodium chloride 0.9 % 100 mL IVPB (mini-bag) (has no administration in time range)     Followed by   piperacillin-tazobactam (ZOSYN) 3,375 mg in sodium chloride 0.9 % 50 mL IVPB (mini-bag) (has no administration in time range)   0.9 % sodium chloride infusion (has no administration in time range)   piperacillin-tazobactam (ZOSYN) 4,500 mg in sodium chloride 0.9 % 100 mL IVPB (mini-bag) (0 mg IntraVENous Stopped 1/12/25 1816)   OLANZapine (ZYPREXA) tablet 2.5 mg (2.5 mg Oral Given 1/12/25 1723)   divalproex (DEPAKOTE SPRINKLE) DR capsule 125 mg (125 mg Oral Given 1/12/25 1723)     Last documented pain medication administration:   Pertinent or High Risk Medications/Drips: no   If Yes, please provide details:   Blood Product Administration: no  If Yes, please provide details:   Process Protocols/Bundles: N/A    Recommendation  Incomplete STAT orders:   Overdue Medications:   Patient Belongings:    Additional Comments:   If any further questions, please call Sending RN at [phone]      Admitting Unit Notification  Name of person notified and time: 4N RN [id number], room 4270      Electronically signed by: Electronically signed by Mira Sorensen RN on 1/12/2025 at 7:39 PM

## 2025-01-13 NOTE — PROGRESS NOTES
Occupational Therapy  Facility/Department: [address] MED SURG  Occupational Therapy Initial Assessment/Discharge    Name: Aki Payne  : 1940  MRN: [id number]  Date of Service: 2025    Discharge Recommendations:  24 hour supervision or assist  OT Equipment Recommendations  Equipment Needed: No  Aki Payne scored a 18/24 on the AM-PAC ADL Inpatient form.  At this time, no further OT is recommended upon discharge due to good support at home from wife and functioning near his baseline.  Recommend patient returns to prior setting with prior services.        Patient Diagnosis(es): The primary encounter diagnosis was Acute urinary tract infection. Diagnoses of Multiple drug resistant organism (MDRO) culture positive, Altered mental status, unspecified altered mental status type, Elevated lactic acid level, and Elevated troponin were also pertinent to this visit.  Past Medical History:  has a past medical history of Bladder cancer (HCC), Blood in urine, CAD (coronary artery disease), Colon polyps, Dementia (HCC), DNR (do not resuscitate), and Hyperlipidemia.  Past Surgical History:  has a past surgical history that includes Bladder removal (); Colonoscopy (2010); other surgical history (2013, 2014); Colonoscopy (10/05/2016); Tibia fracture surgery (Right, ); and Cystoscopy (N/A, 10/4/2022).    Treatment Diagnosis: SBA bed mob, CGA fxl transfers, Min A footwear      Assessment  Performance deficits / Impairments:  (pt appears to be functioning near his baseline)  Assessment: Pt is an 84 y.o. M admitted for UTI and AMS. Hx of dementia. Recently completed therapy in SNF setting after fall at home resulting in LUE fx, treated conservatively. At his baseline, pt assisted w/ ADLs. Today- wife present in the room to assist w/ questions (pt baseline A&Ox1). He completed bed mob w/ SBA, fxl transfers/mob w/o device and CGA, and Min A for footwear in stance. Pt is anticipated to require up to  lower body clothing?: A Little  How much help is needed for bathing (which includes washing, rinsing, drying)?: A Little  How much help is needed for toileting (which includes using toilet, bedpan, or urinal)?: A Little  How much help is needed for putting on and taking off regular upper body clothing?: A Little  How much help is needed for taking care of personal grooming?: A Little  How much help for eating meals?: A Little  AM-[facility] Inpatient Daily Activity Raw Score: 18  AM-PAC Inpatient ADL T-Scale Score : 38.66  ADL Inpatient CMS 0-100% Score: 46.65  ADL Inpatient CMS G-Code Modifier : CK    Goals  Short Term Goals  Time Frame for Short Term Goals: No acute OT goals identified  Patient Goals   Patient goals : pt did not state goals      Therapy Time   Individual Concurrent Group Co-treatment   Time In 1000         Time Out 1030         Minutes 30         Timed Code Treatment Minutes: 15 Minutes (15 min eval)       Sven Cortez OTR/GIAN [id number]

## 2025-01-13 NOTE — CARE COORDINATION
Case Management Assessment  Initial Evaluation    Date/Time of Evaluation: 1/13/2025 11:57 AM  Assessment Completed by: ALIE Hagan    If patient is discharged prior to next notation, then this note serves as note for discharge by case management.    Patient Name: Aki Payne                   YOB: 1940  Diagnosis: UTI (urinary tract infection) [N39.0]  Elevated troponin [R79.89]  Acute urinary tract infection [N39.0]  Elevated lactic acid level [R79.89]  Multiple drug resistant organism (MDRO) culture positive [Z16.24]  Altered mental status, unspecified altered mental status type [R41.82]                   Date / Time: 1/12/2025  3:48 PM    Patient Admission Status: Inpatient   Readmission Risk (Low < 19, Mod (19-27), High > 27): Readmission Risk Score: 13.1    Current PCP: Zackary Whitman MD  PCP verified by CM? Yes    Chart Reviewed: Yes      History Provided by: Spouse  Patient Orientation: Alert and Oriented    Patient Cognition: Alert    Hospitalization in the last 30 days (Readmission):  No    If yes, Readmission Assessment in CM Navigator will be completed.    Advance Directives:      Code Status: DNR-CC   Patient's Primary Decision Maker is: Legal Next of Kin    Primary Decision Maker: VladrodJess chun - Spouse - [phone]    Secondary Decision Maker: RonaldochaseYe - Child - [phone]    Supplemental (Other) Decision Maker: KerryShaina - Grandchild - [phone]    Discharge Planning:    Patient lives with: (P) Spouse/Significant Other Type of Home: (P) House  Primary Care Giver: Spouse  Patient Support Systems include: Family Members, Spouse/Significant Other   Current Financial resources: (P) None  Current community resources: (P) None  Current services prior to admission: (P) Durable Medical Equipment, Home Care            Current DME: (P) Walker, Shower Chair            Type of Home Care services:  (P) PT, OT, Nursing Services    ADLS  Prior functional level:

## 2025-01-13 NOTE — PROGRESS NOTES
Pt's wife left for the day, within minutes of her leaving pt attempting to get out of chair. Redirection unsuccessful. Allowed pt to walk around the room, pt then went outside room, unable to get back in room even with assistance of PCA. So called charge nurse, who was able to help get pt in room and to bed, however had to restrain pt to get him to stay in room. Pt confused and incomprehensible speech, at risk of wondering unit. Obtained restraint order from MD, granddaughter (Shaina) called as she was the one that took pt's wife home to get rest. Stated her or her dad would be up to see pt.

## 2025-01-13 NOTE — PLAN OF CARE
Problem: Discharge Planning  Goal: Discharge to home or other facility with appropriate resources  1/13/2025 0955 by Michell Holcomb RN  Outcome: Progressing  1/13/2025 0530 by Joellen Rand RN  Outcome: Progressing     Problem: Pain  Goal: Verbalizes/displays adequate comfort level or baseline comfort level  1/13/2025 0955 by Michell Holcomb RN  Outcome: Progressing  1/13/2025 0530 by Joellen Rand RN  Outcome: Progressing     Problem: Safety - Adult  Goal: Free from fall injury  1/13/2025 0955 by Michell Holcomb RN  Outcome: Progressing  1/13/2025 0530 by Joellen Rand RN  Outcome: Progressing     Problem: ABCDS Injury Assessment  Goal: Absence of physical injury  1/13/2025 0955 by Michell Holcomb RN  Outcome: Progressing  1/13/2025 0530 by Joellen Rand RN  Outcome: Progressing     Problem: Skin/Tissue Integrity  Goal: Absence of new skin breakdown  Description: 1.  Monitor for areas of redness and/or skin breakdown  2.  Assess vascular access sites hourly  3.  Every 4-6 hours minimum:  Change oxygen saturation probe site  4.  Every 4-6 hours:  If on nasal continuous positive airway pressure, respiratory therapy assess nares and determine need for appliance change or resting period.  Outcome: Progressing

## 2025-01-13 NOTE — PROGRESS NOTES
Pt admitted to room 4270 from ER. pt alert but  disoriented x4. Hx dementia. Spouse at bedside. Admission questions completed with spouse. Oriented to room and call light.Plan of care and order reviewed with pt and spouse . All questions answered. Fall precautions in place. Call light within pt reach.

## 2025-01-13 NOTE — PROGRESS NOTES
Physical Therapy  Facility/Department: [address] MED SURG  Physical Therapy Initial Assessment and Discharge    Name: Aki Payne  : 1940  MRN: [id number]  Date of Service: 2025    Discharge Recommendations:  24 hour supervision or assist          Patient Diagnosis(es): The primary encounter diagnosis was Acute urinary tract infection. Diagnoses of Multiple drug resistant organism (MDRO) culture positive, Altered mental status, unspecified altered mental status type, Elevated lactic acid level, and Elevated troponin were also pertinent to this visit.  Past Medical History:  has a past medical history of Bladder cancer (HCC), Blood in urine, CAD (coronary artery disease), Colon polyps, Dementia (HCC), DNR (do not resuscitate), and Hyperlipidemia.  Past Surgical History:  has a past surgical history that includes Bladder removal (); Colonoscopy (2010); other surgical history (2013, 2014); Colonoscopy (10/05/2016); Tibia fracture surgery (Right, ); and Cystoscopy (N/A, 10/4/2022).    Assessment  Assessment: Pt is an 84 y.o. male who presented to the ED on 25 with UTI/confusion. Pt with recent left humerus fx on 24 - treated non-operatively - discharged to [facility] where he stayed until 25. Pt currently functioning near baseline per the spouse. Anticipate return home with 24hr supv.  Decision Making: Low Complexity  History: see above  Exam: see below  Clinical Presentation: stable  No Skilled PT: At baseline function  Requires PT Follow-Up: No  Activity Tolerance  Activity Tolerance: Patient tolerated treatment well    Plan  Physical Therapy Plan  General Plan: Discharge  Safety Devices  Type of Devices: Call light within reach, Chair alarm in place, Patient at risk for falls, Left in chair, Nurse notified (wife present)    Restrictions  Position Activity Restriction  Other Position/Activity Restrictions: Acute impacted fracture of the left humeral head/neck with  Impaired  Orientation Level: Disoriented to place;Disoriented to time;Disoriented to situation;Oriented to person (baseline A&Ox1 per wife)  Cognition  Overall Cognitive Status: Exceptions  Following Commands: Inconsistently follows commands  Memory: Impaired  Safety Judgement: Decreased awareness of need for assistance;Decreased awareness of need for safety  Problem Solving: Decreased awareness of errors  Initiation: Requires cues for all  Sequencing: Requires cues for all    Objective  Gross Assessment  Strength: Generally decreased, functional    Bed mobility  Supine to Sit: Stand by assistance (HOB elevated)  Sit to Supine: Unable to assess (pt in recliner)  Transfers  Sit to Stand: Stand by assistance;Contact guard assistance  Stand to Sit: Stand by assistance;Contact guard assistance  Ambulation  Surface: Level tile  Device: No Device  Assistance: Stand by assistance;Contact guard assistance  Gait Deviations: Slow Yvrose;Decreased step length;Decreased step height  Distance: 250'     Balance  Posture: Good  Sitting - Static: Good  Sitting - Dynamic: Good  Standing - Static: Good  Standing - Dynamic: Good;-         AM-PAC - Mobility  AM-PAC Basic Mobility - Inpatient   How much help is needed turning from your back to your side while in a flat bed without using bedrails?: None  How much help is needed moving from lying on your back to sitting on the side of a flat bed without using bedrails?: None  How much help is needed moving to and from a bed to a chair?: None  How much help is needed standing up from a chair using your arms?: None  How much help is needed walking in hospital room?: None  How much help is needed climbing 3-5 steps with a railing?: None  AM-PAC Inpatient Mobility Raw Score : 24  AM-PAC Inpatient T-Scale Score : 61.14  Mobility Inpatient CMS 0-100% Score: 0  Mobility Inpatient CMS G-Code Modifier : CH         Education  Patient Education  Education Given To: Patient  Education Provided: Role

## 2025-01-13 NOTE — DISCHARGE INSTR - COC
Continuity of Care Form    Patient Name: Aki Payne   :  1940  MRN:  [id number]    Admit date:  2025  Discharge date:  ***    Code Status Order: DNR-CC   Advance Directives:   Advance Care Flowsheet Documentation             Admitting Physician:  Zackary Whitman MD  PCP: Zackary Whitman MD    Discharging Nurse: ***  Discharging Hospital Unit/Room#: F7K-2427/4270-01  Discharging Unit Phone Number: ***    Emergency Contact:   Extended Emergency Contact Information  Primary Emergency Contact: Jess Payne  Address: [address] [place] [address] [place] [address]  Home Phone: [phone]  Mobile Phone: [phone]  Relation: Spouse  Secondary Emergency Contact: Ye Payne  Home Phone: [phone]  Mobile Phone: [phone]  Relation: Child    Past Surgical History:  Past Surgical History:   Procedure Laterality Date    BLADDER REMOVAL      with ileal conduit    COLONOSCOPY  2010    Due in     COLONOSCOPY  10/05/2016    [facility]    CYSTOSCOPY N/A 10/4/2022    ENDOSCOPIC EXPLORATION OF THE ILEAL CONDUIT WITH BIOPSY AND FULGERATION performed by Joseph Elkins MD at [facility] OR    OTHER SURGICAL HISTORY  2013, 2014    Ileal conduit dilatation ([place])    TIBIA FRACTURE SURGERY Right 1980       Immunization History:   Immunization History   Administered Date(s) Administered    COVID-19, MODERNA BLUE border, Primary or Immunocompromised, (age 12y+), IM, 100 mcg/0.5mL 2021, 2021, 2021    Influenza Vaccine, unspecified formulation 10/27/2015    Influenza Virus Vaccine 10/22/2013, 10/28/2014    Influenza, FLUAD, (age 65 y+), IM, Quadv, 0.5mL 10/31/2022    Influenza, FLUAD, (age 65 y+), IM, Trivalent PF, 0.5mL 10/01/2019    Influenza, FLUZONE High Dose, (age 65 y+), IM, Trivalent PF, 0.5mL 10/24/2016, 2017, 10/26/2018    Pneumococcal, PCV-13, PREVNAR 13, (age 6w+), IM, 0.5mL 10/28/2014, 10/27/2015    Pneumococcal, PPSV23, PNEUMOVAX 23, (age 2y+),  Discharge:     Physician Certification: I certify the above information and transfer of Aki Payne  is necessary for the continuing treatment of the diagnosis listed and that he requires Home Care for greater 30 days.     Update Admission H&P: No change in H&P    PHYSICIAN SIGNATURE:  Electronically signed by ROLANDO WOLF MD on 1/15/25 at 1:51 PM EST

## 2025-01-13 NOTE — PROGRESS NOTES
4 Eyes Skin Assessment     NAME:  Aki Payne  YOB: 1940  MEDICAL RECORD NUMBER:  [id number]    The patient is being assessed for  Admission    I agree that at least one RN has performed a thorough Head to Toe Skin Assessment on the patient. ALL assessment sites listed below have been assessed.      Areas assessed by both nurses:    Head, Face, Ears, Shoulders, Back, Chest, Arms, Elbows, Hands, Sacrum. Buttock, Coccyx, Ischium, and Legs. Feet and Heels        Does the Patient have a Wound? No noted wound(s)       Lambert Prevention initiated by RN: No  Wound Care Orders initiated by RN: No    Pressure Injury (Stage 3,4, Unstageable, DTI, NWPT, and Complex wounds) if present, place Wound referral order by RN under : No    New Ostomies, if present place, Ostomy referral order under : No     Nurse 1 eSignature: Electronically signed by Joellen Rand RN on 1/13/25 at 1:08 AM EST    **SHARE this note so that the co-signing nurse can place an eSignature**    Nurse 2 eSignature: Electronically signed by Sadia García RN on 1/13/25 at 4:31 AM EST

## 2025-01-14 LAB
ALBUMIN SERPL-MCNC: 3.3 G/DL (ref 3.4–5)
ALBUMIN/GLOB SERPL: 1.1 {RATIO} (ref 1.1–2.2)
ALP SERPL-CCNC: 80 U/L (ref 40–129)
ALT SERPL-CCNC: 13 U/L (ref 10–40)
ANION GAP SERPL CALCULATED.3IONS-SCNC: 11 MMOL/L (ref 3–16)
AST SERPL-CCNC: 21 U/L (ref 15–37)
BASOPHILS # BLD: 0.1 K/UL (ref 0–0.2)
BASOPHILS NFR BLD: 0.8 %
BILIRUB SERPL-MCNC: 0.7 MG/DL (ref 0–1)
BUN SERPL-MCNC: 13 MG/DL (ref 7–20)
CALCIUM SERPL-MCNC: 9.9 MG/DL (ref 8.3–10.6)
CHLORIDE SERPL-SCNC: 111 MMOL/L (ref 99–110)
CO2 SERPL-SCNC: 23 MMOL/L (ref 21–32)
CREAT SERPL-MCNC: 0.9 MG/DL (ref 0.8–1.3)
DEPRECATED RDW RBC AUTO: 15.1 % (ref 12.4–15.4)
EOSINOPHIL # BLD: 0.1 K/UL (ref 0–0.6)
EOSINOPHIL NFR BLD: 1.9 %
GFR SERPLBLD CREATININE-BSD FMLA CKD-EPI: 84 ML/MIN/{1.73_M2}
GLUCOSE SERPL-MCNC: 103 MG/DL (ref 70–99)
HCT VFR BLD AUTO: 33.2 % (ref 40.5–52.5)
HGB BLD-MCNC: 11.3 G/DL (ref 13.5–17.5)
LYMPHOCYTES # BLD: 1.6 K/UL (ref 1–5.1)
LYMPHOCYTES NFR BLD: 23.7 %
MAGNESIUM SERPL-MCNC: 2.19 MG/DL (ref 1.8–2.4)
MCH RBC QN AUTO: 29.2 PG (ref 26–34)
MCHC RBC AUTO-ENTMCNC: 34 G/DL (ref 31–36)
MCV RBC AUTO: 86 FL (ref 80–100)
MONOCYTES # BLD: 0.6 K/UL (ref 0–1.3)
MONOCYTES NFR BLD: 8.5 %
NEUTROPHILS # BLD: 4.4 K/UL (ref 1.7–7.7)
NEUTROPHILS NFR BLD: 65.1 %
PLATELET # BLD AUTO: 301 K/UL (ref 135–450)
PMV BLD AUTO: 7.5 FL (ref 5–10.5)
POTASSIUM SERPL-SCNC: 4.1 MMOL/L (ref 3.5–5.1)
PROT SERPL-MCNC: 6.4 G/DL (ref 6.4–8.2)
RBC # BLD AUTO: 3.87 M/UL (ref 4.2–5.9)
SODIUM SERPL-SCNC: 145 MMOL/L (ref 136–145)
WBC # BLD AUTO: 6.8 K/UL (ref 4–11)

## 2025-01-14 PROCEDURE — 2580000003 HC RX 258: Performed by: INTERNAL MEDICINE

## 2025-01-14 PROCEDURE — 6370000000 HC RX 637 (ALT 250 FOR IP): Performed by: INTERNAL MEDICINE

## 2025-01-14 PROCEDURE — 80053 COMPREHEN METABOLIC PANEL: CPT

## 2025-01-14 PROCEDURE — 1200000000 HC SEMI PRIVATE

## 2025-01-14 PROCEDURE — 6360000002 HC RX W HCPCS: Performed by: STUDENT IN AN ORGANIZED HEALTH CARE EDUCATION/TRAINING PROGRAM

## 2025-01-14 PROCEDURE — 36415 COLL VENOUS BLD VENIPUNCTURE: CPT

## 2025-01-14 PROCEDURE — 94760 N-INVAS EAR/PLS OXIMETRY 1: CPT

## 2025-01-14 PROCEDURE — 6370000000 HC RX 637 (ALT 250 FOR IP): Performed by: NURSE PRACTITIONER

## 2025-01-14 PROCEDURE — 83735 ASSAY OF MAGNESIUM: CPT

## 2025-01-14 PROCEDURE — 85025 COMPLETE CBC W/AUTO DIFF WBC: CPT

## 2025-01-14 PROCEDURE — 6360000002 HC RX W HCPCS: Performed by: INTERNAL MEDICINE

## 2025-01-14 PROCEDURE — 2500000003 HC RX 250 WO HCPCS: Performed by: STUDENT IN AN ORGANIZED HEALTH CARE EDUCATION/TRAINING PROGRAM

## 2025-01-14 PROCEDURE — 99232 SBSQ HOSP IP/OBS MODERATE 35: CPT | Performed by: INTERNAL MEDICINE

## 2025-01-14 RX ORDER — GUAIFENESIN/DEXTROMETHORPHAN 100-10MG/5
10 SYRUP ORAL EVERY 4 HOURS PRN
Status: DISCONTINUED | OUTPATIENT
Start: 2025-01-14 | End: 2025-01-15 | Stop reason: HOSPADM

## 2025-01-14 RX ORDER — ALBUTEROL SULFATE 90 UG/1
2 INHALANT RESPIRATORY (INHALATION) EVERY 6 HOURS PRN
Status: DISCONTINUED | OUTPATIENT
Start: 2025-01-14 | End: 2025-01-15 | Stop reason: HOSPADM

## 2025-01-14 RX ORDER — CITALOPRAM HYDROBROMIDE 20 MG/1
20 TABLET ORAL DAILY
Status: DISCONTINUED | OUTPATIENT
Start: 2025-01-14 | End: 2025-01-15 | Stop reason: HOSPADM

## 2025-01-14 RX ORDER — VITAMIN B COMPLEX
1000 TABLET ORAL DAILY
Status: DISCONTINUED | OUTPATIENT
Start: 2025-01-14 | End: 2025-01-15 | Stop reason: HOSPADM

## 2025-01-14 RX ORDER — DIVALPROEX SODIUM 125 MG/1
125 CAPSULE, COATED PELLETS ORAL 2 TIMES DAILY
Status: DISCONTINUED | OUTPATIENT
Start: 2025-01-14 | End: 2025-01-15 | Stop reason: HOSPADM

## 2025-01-14 RX ADMIN — OLANZAPINE 2.5 MG: 5 TABLET, FILM COATED ORAL at 20:20

## 2025-01-14 RX ADMIN — SODIUM CHLORIDE 3000 MG: 900 INJECTION INTRAVENOUS at 03:34

## 2025-01-14 RX ADMIN — SODIUM CHLORIDE 3000 MG: 900 INJECTION INTRAVENOUS at 15:56

## 2025-01-14 RX ADMIN — SODIUM CHLORIDE 3000 MG: 900 INJECTION INTRAVENOUS at 21:36

## 2025-01-14 RX ADMIN — SODIUM CHLORIDE 3000 MG: 900 INJECTION INTRAVENOUS at 09:32

## 2025-01-14 RX ADMIN — OLANZAPINE 2.5 MG: 5 TABLET, FILM COATED ORAL at 06:53

## 2025-01-14 RX ADMIN — DIVALPROEX SODIUM 125 MG: 125 CAPSULE ORAL at 20:20

## 2025-01-14 RX ADMIN — ENOXAPARIN SODIUM 40 MG: 100 INJECTION SUBCUTANEOUS at 09:40

## 2025-01-14 RX ADMIN — SODIUM CHLORIDE, PRESERVATIVE FREE 10 ML: 5 INJECTION INTRAVENOUS at 20:21

## 2025-01-14 RX ADMIN — SODIUM CHLORIDE, PRESERVATIVE FREE 10 ML: 5 INJECTION INTRAVENOUS at 09:29

## 2025-01-14 RX ADMIN — GUAIFENESIN SYRUP AND DEXTROMETHORPHAN 10 ML: 100; 10 SYRUP ORAL at 21:31

## 2025-01-14 NOTE — PLAN OF CARE
Problem: Discharge Planning  Goal: Discharge to home or other facility with appropriate resources  1/14/2025 1031 by Lupe Donis RN  Outcome: Progressing       Problem: Pain  Goal: Verbalizes/displays adequate comfort level or baseline comfort level  1/14/2025 1031 by Lupe Donis RN  Outcome: Progressing     Problem: Safety - Adult  Goal: Free from fall injury  1/14/2025 1031 by Lupe Donis RN  Outcome: Progressing     Problem: ABCDS Injury Assessment  Goal: Absence of physical injury  1/14/2025 1031 by Lupe Donis RN  Outcome: Progressing     Problem: Skin/Tissue Integrity  Goal: Absence of new skin breakdown  Description: 1.  Monitor for areas of redness and/or skin breakdown  2.  Assess vascular access sites hourly  3.  Every 4-6 hours minimum:  Change oxygen saturation probe site  4.  Every 4-6 hours:  If on nasal continuous positive airway pressure, respiratory therapy assess nares and determine need for appliance change or resting period.  1/14/2025 1031 by Lupe Donis RN  Outcome: Progressing     Problem: Safety - Medical Restraint  Goal: Remains free of injury from restraints (Restraint for Interference with Medical Device)  Description: INTERVENTIONS:  1. Determine that other, less restrictive measures have been tried or would not be effective before applying the restraint  2. Evaluate the patient's condition at the time of restraint application  3. Inform patient/family regarding the reason for restraint  4. Q2H: Monitor safety, psychosocial status, comfort, nutrition and hydration  1/14/2025 1031 by Lupe Donis, RN  Outcome: Progressing

## 2025-01-14 NOTE — CARE COORDINATION
Chart review done, being seen by PCP specialist. Likely another 2 days continued stay to finish IVABs. Will be planning for home with HC through [facility].   Will need orders.     Gio Diop LMSW, [facility] Social Work Case Management   Phone: [phone]  Fax: [phone]

## 2025-01-14 NOTE — PLAN OF CARE
Problem: Discharge Planning  Goal: Discharge to home or other facility with appropriate resources  1/13/2025 2351 by Susu Rivera RN  Outcome: Progressing  1/13/2025 0955 by Michell Holcomb RN  Outcome: Progressing     Problem: Pain  Goal: Verbalizes/displays adequate comfort level or baseline comfort level  1/13/2025 2351 by Susu Rivera RN  Outcome: Progressing  1/13/2025 0955 by Michell Holcomb RN  Outcome: Progressing     Problem: Safety - Adult  Goal: Free from fall injury  1/13/2025 2351 by Susu Rivera RN  Outcome: Progressing  1/13/2025 0955 by Michell Holcomb RN  Outcome: Progressing     Problem: ABCDS Injury Assessment  Goal: Absence of physical injury  1/13/2025 2351 by Susu Rivera RN  Outcome: Progressing  1/13/2025 0955 by Michell Holcomb RN  Outcome: Progressing     Problem: Skin/Tissue Integrity  Goal: Absence of new skin breakdown  Description: 1.  Monitor for areas of redness and/or skin breakdown  2.  Assess vascular access sites hourly  3.  Every 4-6 hours minimum:  Change oxygen saturation probe site  4.  Every 4-6 hours:  If on nasal continuous positive airway pressure, respiratory therapy assess nares and determine need for appliance change or resting period.  1/13/2025 2351 by Susu Rivera RN  Outcome: Progressing  1/13/2025 0955 by Michell Holcomb RN  Outcome: Progressing     Problem: Safety - Medical Restraint  Goal: Remains free of injury from restraints (Restraint for Interference with Medical Device)  Description: INTERVENTIONS:  1. Determine that other, less restrictive measures have been tried or would not be effective before applying the restraint  2. Evaluate the patient's condition at the time of restraint application  3. Inform patient/family regarding the reason for restraint  4. Q2H: Monitor safety, psychosocial status, comfort, nutrition and hydration  Outcome: Progressing  Flowsheets  Taken 1/13/2025 1606 by Michell Holcomb, RN  Remains free of  injury from restraints (restraint for interference with medical device): Every 2 hours: Monitor safety, psychosocial status, comfort, nutrition and hydration  Taken 1/13/2025 1554 by Michell Holcomb RN  Remains free of injury from restraints (restraint for interference with medical device): Every 2 hours: Monitor safety, psychosocial status, comfort, nutrition and hydration

## 2025-01-14 NOTE — PROGRESS NOTES
[facility] Note      Chief Complaint: Patient lying flat in bed, mumbling.    Subjective/Interval History:    Patient currently not able to communicate meaningfully.  He is alert and lying in bed, mumbling.  Unable to discern what he is saying.  Patient placed in [place] and restraints last night since wife was not available to sit with him.  He is comfortable and is not in any distress.    Review of systems unobtainable due to his mental status.  He does appear to be at his mental status baseline.    PMH, PSH, FH/SH reviewed and unchanged as documented in the H&P personally documented at admission 25    Medication list reviewed    Objective:    /74   Pulse 72   Temp 97.6 °F (36.4 °C) (Axillary)   Resp 18   Ht 1.753 m (5' 9\")   Wt 85.7 kg (188 lb 15 oz)   SpO2 95%   BMI 27.90 kg/m²   Temp  Av.6 °F (36.4 °C)  Min: 97.6 °F (36.4 °C)  Max: 97.6 °F (36.4 °C)    RRR  Chest-respirations easy.  Lungs are clear bilaterally  Abd- BS+, soft, NTND  Ext- no edema    The Following Labs Were Reviewed Today:     Recent Results (from the past 24 hour(s))   Comprehensive Metabolic Panel    Collection Time: 25  4:17 AM   Result Value Ref Range    Sodium 145 136 - 145 mmol/L    Potassium 4.1 3.5 - 5.1 mmol/L    Chloride 111 (H) 99 - 110 mmol/L    CO2 23 21 - 32 mmol/L    Anion Gap 11 3 - 16    Glucose 103 (H) 70 - 99 mg/dL    BUN 13 7 - 20 mg/dL    Creatinine 0.9 0.8 - 1.3 mg/dL    Est, Glom Filt Rate 84 >60    Calcium 9.9 8.3 - 10.6 mg/dL    Total Protein 6.4 6.4 - 8.2 g/dL    Albumin 3.3 (L) 3.4 - 5.0 g/dL    Albumin/Globulin Ratio 1.1 1.1 - 2.2    Total Bilirubin 0.7 0.0 - 1.0 mg/dL    Alkaline Phosphatase 80 40 - 129 U/L    ALT 13 10 - 40 U/L    AST 21 15 - 37 U/L   CBC with Auto Differential    Collection Time: 25  4:17 AM   Result Value Ref Range    WBC 6.8 4.0 - 11.0 K/uL    RBC 3.87 (L) 4.20 - 5.90 M/uL    Hemoglobin 11.3 (L) 13.5 - 17.5 g/dL    Hematocrit 33.2 (L) 40.5 - 52.5 %

## 2025-01-15 VITALS
TEMPERATURE: 97.6 F | HEIGHT: 69 IN | RESPIRATION RATE: 16 BRPM | WEIGHT: 186.95 LBS | DIASTOLIC BLOOD PRESSURE: 77 MMHG | BODY MASS INDEX: 27.69 KG/M2 | OXYGEN SATURATION: 96 % | HEART RATE: 60 BPM | SYSTOLIC BLOOD PRESSURE: 152 MMHG

## 2025-01-15 LAB
ALBUMIN SERPL-MCNC: 3.1 G/DL (ref 3.4–5)
ALBUMIN/GLOB SERPL: 1 {RATIO} (ref 1.1–2.2)
ALP SERPL-CCNC: 78 U/L (ref 40–129)
ALT SERPL-CCNC: 15 U/L (ref 10–40)
ANION GAP SERPL CALCULATED.3IONS-SCNC: 12 MMOL/L (ref 3–16)
AST SERPL-CCNC: 24 U/L (ref 15–37)
BASOPHILS # BLD: 0 K/UL (ref 0–0.2)
BASOPHILS NFR BLD: 0.9 %
BILIRUB SERPL-MCNC: 0.5 MG/DL (ref 0–1)
BUN SERPL-MCNC: 11 MG/DL (ref 7–20)
CALCIUM SERPL-MCNC: 10 MG/DL (ref 8.3–10.6)
CHLORIDE SERPL-SCNC: 112 MMOL/L (ref 99–110)
CO2 SERPL-SCNC: 23 MMOL/L (ref 21–32)
CREAT SERPL-MCNC: 0.7 MG/DL (ref 0.8–1.3)
DEPRECATED RDW RBC AUTO: 14.8 % (ref 12.4–15.4)
EOSINOPHIL # BLD: 0.3 K/UL (ref 0–0.6)
EOSINOPHIL NFR BLD: 5.2 %
GFR SERPLBLD CREATININE-BSD FMLA CKD-EPI: >90 ML/MIN/{1.73_M2}
GLUCOSE SERPL-MCNC: 87 MG/DL (ref 70–99)
HCT VFR BLD AUTO: 33.5 % (ref 40.5–52.5)
HGB BLD-MCNC: 11.4 G/DL (ref 13.5–17.5)
LYMPHOCYTES # BLD: 1.2 K/UL (ref 1–5.1)
LYMPHOCYTES NFR BLD: 23.9 %
MCH RBC QN AUTO: 29.2 PG (ref 26–34)
MCHC RBC AUTO-ENTMCNC: 34.2 G/DL (ref 31–36)
MCV RBC AUTO: 85.5 FL (ref 80–100)
MONOCYTES # BLD: 0.3 K/UL (ref 0–1.3)
MONOCYTES NFR BLD: 6.5 %
NEUTROPHILS # BLD: 3.1 K/UL (ref 1.7–7.7)
NEUTROPHILS NFR BLD: 63.5 %
PLATELET # BLD AUTO: 315 K/UL (ref 135–450)
PMV BLD AUTO: 6.9 FL (ref 5–10.5)
POTASSIUM SERPL-SCNC: 3.2 MMOL/L (ref 3.5–5.1)
PROT SERPL-MCNC: 6.2 G/DL (ref 6.4–8.2)
RBC # BLD AUTO: 3.92 M/UL (ref 4.2–5.9)
SODIUM SERPL-SCNC: 147 MMOL/L (ref 136–145)
WBC # BLD AUTO: 4.9 K/UL (ref 4–11)

## 2025-01-15 PROCEDURE — 2580000003 HC RX 258: Performed by: INTERNAL MEDICINE

## 2025-01-15 PROCEDURE — 80053 COMPREHEN METABOLIC PANEL: CPT

## 2025-01-15 PROCEDURE — 2500000003 HC RX 250 WO HCPCS: Performed by: STUDENT IN AN ORGANIZED HEALTH CARE EDUCATION/TRAINING PROGRAM

## 2025-01-15 PROCEDURE — 6360000002 HC RX W HCPCS: Performed by: STUDENT IN AN ORGANIZED HEALTH CARE EDUCATION/TRAINING PROGRAM

## 2025-01-15 PROCEDURE — 6360000002 HC RX W HCPCS: Performed by: INTERNAL MEDICINE

## 2025-01-15 PROCEDURE — 94760 N-INVAS EAR/PLS OXIMETRY 1: CPT

## 2025-01-15 PROCEDURE — 36415 COLL VENOUS BLD VENIPUNCTURE: CPT

## 2025-01-15 PROCEDURE — 99232 SBSQ HOSP IP/OBS MODERATE 35: CPT | Performed by: INTERNAL MEDICINE

## 2025-01-15 PROCEDURE — 85025 COMPLETE CBC W/AUTO DIFF WBC: CPT

## 2025-01-15 PROCEDURE — 6370000000 HC RX 637 (ALT 250 FOR IP): Performed by: INTERNAL MEDICINE

## 2025-01-15 RX ORDER — PHENOL 1.4 %
10-20 AEROSOL, SPRAY (ML) MUCOUS MEMBRANE NIGHTLY
COMMUNITY

## 2025-01-15 RX ORDER — AMLODIPINE BESYLATE 2.5 MG/1
2.5 TABLET ORAL DAILY
Qty: 30 TABLET | Refills: 3 | Status: SHIPPED | OUTPATIENT
Start: 2025-01-16

## 2025-01-15 RX ORDER — AMOXICILLIN 875 MG/1
875 TABLET, COATED ORAL 2 TIMES DAILY
Qty: 10 TABLET | Refills: 0 | Status: SHIPPED | OUTPATIENT
Start: 2025-01-15 | End: 2025-01-20

## 2025-01-15 RX ORDER — AMLODIPINE BESYLATE 5 MG/1
2.5 TABLET ORAL DAILY
Status: DISCONTINUED | OUTPATIENT
Start: 2025-01-15 | End: 2025-01-15 | Stop reason: HOSPADM

## 2025-01-15 RX ADMIN — SODIUM CHLORIDE 3000 MG: 900 INJECTION INTRAVENOUS at 16:06

## 2025-01-15 RX ADMIN — SODIUM CHLORIDE 3000 MG: 900 INJECTION INTRAVENOUS at 09:53

## 2025-01-15 RX ADMIN — CITALOPRAM HYDROBROMIDE 20 MG: 20 TABLET ORAL at 09:44

## 2025-01-15 RX ADMIN — SODIUM CHLORIDE 3000 MG: 900 INJECTION INTRAVENOUS at 02:35

## 2025-01-15 RX ADMIN — Medication 1000 UNITS: at 09:44

## 2025-01-15 RX ADMIN — SODIUM CHLORIDE, PRESERVATIVE FREE 10 ML: 5 INJECTION INTRAVENOUS at 09:45

## 2025-01-15 RX ADMIN — DIVALPROEX SODIUM 125 MG: 125 CAPSULE ORAL at 09:44

## 2025-01-15 RX ADMIN — POTASSIUM BICARBONATE 40 MEQ: 782 TABLET, EFFERVESCENT ORAL at 12:09

## 2025-01-15 RX ADMIN — AMLODIPINE BESYLATE 2.5 MG: 5 TABLET ORAL at 12:09

## 2025-01-15 RX ADMIN — ENOXAPARIN SODIUM 40 MG: 100 INJECTION SUBCUTANEOUS at 09:44

## 2025-01-15 NOTE — PROGRESS NOTES
Pt dc to home. Went over paperwork. Pt edu provided. Answered all questions. Pt iv removed. Pt is dressed and belongings are paced.wife at bedside for ride home. Waiting for retail pharmacy to deliver their meds.

## 2025-01-15 NOTE — PROGRESS NOTES
Physician Progress Note      PATIENT:               RAFAEL PENNINGTON  CSN #:                  [id number]  :                       1940  ADMIT DATE:       2025 3:48 PM  DISCH DATE:  RESPONDING  PROVIDER #:        Zackary Whitman MD          QUERY TEXT:    Pt admitted with UTI.  Pt noted to have an ileal conduit. If possible, please   document in the progress notes and discharge summary if you are evaluating   and/or treating any of the following:    The medical record reflects the following:  Risk Factors: UTI, ileal conduit    Clinical Indicators:  HP- \" Patient has a history of an ileal conduit and   a urine culture was obtained the day the patient left [facility].  The urine   culture grew Enterococcus and Klebsiella.\"    Treatment: IV abx, oral amoxicillin  Options provided:  -- UTI due to ileal conduit  -- UTI not due to ileal conduit  -- Other - I will add my own diagnosis  -- Disagree - Not applicable / Not valid  -- Disagree - Clinically unable to determine / Unknown  -- Refer to Clinical Documentation Reviewer    PROVIDER RESPONSE TEXT:    UTI is due to ileal conduit.    Query created by: Johanna Herrera on 1/15/2025 10:24 AM      Electronically signed by:  Zackary Whitman MD 1/15/2025 11:57 AM

## 2025-01-15 NOTE — PROGRESS NOTES
Discussed with nursing.  Patient taking p.o. very well.  Wife feels comfortable with him at home.  Plan to discharge with home with follow-up next week.  Electronically signed by ROLANDO WOLF MD on 1/15/2025 at 1:49 PM

## 2025-01-15 NOTE — PLAN OF CARE
Problem: Discharge Planning  Goal: Discharge to home or other facility with appropriate resources  1/15/2025 1116 by Sherri Lopez RN  Outcome: Progressing  1/14/2025 2248 by Jaya Weems RN  Outcome: Progressing     Problem: Pain  Goal: Verbalizes/displays adequate comfort level or baseline comfort level  1/15/2025 1116 by Sherri Lopez RN  Outcome: Progressing  1/14/2025 2248 by Jaya Weems RN  Outcome: Progressing     Problem: Safety - Adult  Goal: Free from fall injury  1/15/2025 1116 by Sherri Lopez RN  Outcome: Progressing  1/14/2025 2248 by Jaya Weems RN  Outcome: Progressing     Problem: ABCDS Injury Assessment  Goal: Absence of physical injury  1/15/2025 1116 by Sherri Lopez RN  Outcome: Progressing  1/14/2025 2248 by Jaya Weems RN  Outcome: Progressing     Problem: Skin/Tissue Integrity  Goal: Absence of new skin breakdown  Description: 1.  Monitor for areas of redness and/or skin breakdown  2.  Assess vascular access sites hourly  3.  Every 4-6 hours minimum:  Change oxygen saturation probe site  4.  Every 4-6 hours:  If on nasal continuous positive airway pressure, respiratory therapy assess nares and determine need for appliance change or resting period.  1/15/2025 1116 by Sherri Lopez RN  Outcome: Progressing  1/14/2025 2248 by Jaya Weems RN  Outcome: Progressing     Problem: Safety - Medical Restraint  Goal: Remains free of injury from restraints (Restraint for Interference with Medical Device)  Description: INTERVENTIONS:  1. Determine that other, less restrictive measures have been tried or would not be effective before applying the restraint  2. Evaluate the patient's condition at the time of restraint application  3. Inform patient/family regarding the reason for restraint  4. Q2H: Monitor safety, psychosocial status, comfort, nutrition and hydration  1/15/2025 1116 by Sherri Lopez RN  Outcome: Progressing  1/14/2025 2248 by Jaya Weems

## 2025-01-15 NOTE — PLAN OF CARE
Problem: Discharge Planning  Goal: Discharge to home or other facility with appropriate resources  1/14/2025 2248 by Jaya Weems RN  Outcome: Progressing  1/14/2025 1031 by Lupe Donis RN  Outcome: Progressing     Problem: Pain  Goal: Verbalizes/displays adequate comfort level or baseline comfort level  1/14/2025 2248 by Jaya Weems RN  Outcome: Progressing  1/14/2025 1031 by Lupe Donis RN  Outcome: Progressing     Problem: Safety - Adult  Goal: Free from fall injury  1/14/2025 2248 by Jaya Weems RN  Outcome: Progressing  1/14/2025 1031 by Lupe Donis RN  Outcome: Progressing     Problem: ABCDS Injury Assessment  Goal: Absence of physical injury  1/14/2025 2248 by Jaya Weems RN  Outcome: Progressing  1/14/2025 1031 by Lupe Donis RN  Outcome: Progressing     Problem: Skin/Tissue Integrity  Goal: Absence of new skin breakdown  Description: 1.  Monitor for areas of redness and/or skin breakdown  2.  Assess vascular access sites hourly  3.  Every 4-6 hours minimum:  Change oxygen saturation probe site  4.  Every 4-6 hours:  If on nasal continuous positive airway pressure, respiratory therapy assess nares and determine need for appliance change or resting period.  1/14/2025 2248 by Jaya Weems RN  Outcome: Progressing  1/14/2025 1031 by Lupe Donis RN  Outcome: Progressing     Problem: Safety - Medical Restraint  Goal: Remains free of injury from restraints (Restraint for Interference with Medical Device)  Description: INTERVENTIONS:  1. Determine that other, less restrictive measures have been tried or would not be effective before applying the restraint  2. Evaluate the patient's condition at the time of restraint application  3. Inform patient/family regarding the reason for restraint  4. Q2H: Monitor safety, psychosocial status, comfort, nutrition and hydration  1/14/2025 2248 by Jaya Weems RN  Outcome: Progressing  1/14/2025 1031 by Lupe Donis  normal...

## 2025-01-15 NOTE — CARE COORDINATION
CASE MANAGEMENT DISCHARGE SUMMARY:    DISCHARGE DATE: 1/15/25    DISCHARGED TO HOME     Discharging to Facility/ Agency   Name: [facility]  Address:  [address] [address]  Phone:  [phone]  Fax:  [phone]     TRANSPORTATION: family             TIME: this afternoon     Gio Diop LMSW, [facility] Social Work Case Management   Phone: [phone]  Fax: [phone]   Electronically signed by ALIE Hagan on 1/15/2025 at 2:10 PM

## 2025-01-15 NOTE — PROGRESS NOTES
[facility] Note      Chief Complaint: Patient lying flat in bed, mumbling.    Subjective/Interval History:    This morning the patient is awake and resting comfortably in bed.  Still is wearing a Posey vest for safety.  Patient was very lethargic yesterday, not taking much by mouth.  Sodium is up a bit today.  Blood pressures have been running a bit high since admission as well.    Discussed with nursing.  Patient able to take his pills by mouth this morning with applesauce.    Review of systems unobtainable due to his mental status.  Patient not answering questions but does look at examiner  He does appear to be at his mental status baseline.    PMH, PSH, FH/SH reviewed and unchanged as documented in the H&P personally documented at admission 25    Medication list reviewed    Objective:    BP (!) 169/86   Pulse 61   Temp 97.6 °F (36.4 °C) (Oral)   Resp 16   Ht 1.753 m (5' 9\")   Wt 84.8 kg (186 lb 15.2 oz)   SpO2 98%   BMI 27.61 kg/m²   Temp  Av.5 °F (36.4 °C)  Min: 97.1 °F (36.2 °C)  Max: 97.7 °F (36.5 °C)    RRR  Chest-respirations easy.  Lungs are clear bilaterally  Abd- BS+, soft, NTND  Ext- no edema    The Following Labs Were Reviewed Today:     Recent Results (from the past 24 hour(s))   Comprehensive Metabolic Panel    Collection Time: 01/15/25  5:03 AM   Result Value Ref Range    Sodium 147 (H) 136 - 145 mmol/L    Potassium 3.2 (L) 3.5 - 5.1 mmol/L    Chloride 112 (H) 99 - 110 mmol/L    CO2 23 21 - 32 mmol/L    Anion Gap 12 3 - 16    Glucose 87 70 - 99 mg/dL    BUN 11 7 - 20 mg/dL    Creatinine 0.7 (L) 0.8 - 1.3 mg/dL    Est, Glom Filt Rate >90 >60    Calcium 10.0 8.3 - 10.6 mg/dL    Total Protein 6.2 (L) 6.4 - 8.2 g/dL    Albumin 3.1 (L) 3.4 - 5.0 g/dL    Albumin/Globulin Ratio 1.0 (L) 1.1 - 2.2    Total Bilirubin 0.5 0.0 - 1.0 mg/dL    Alkaline Phosphatase 78 40 - 129 U/L    ALT 15 10 - 40 U/L    AST 24 15 - 37 U/L   CBC with Auto Differential    Collection Time: 01/15/25

## 2025-01-15 NOTE — PROGRESS NOTES
Patient resting in bed, eyes closed. No behaviors observed. Posey vest and bilateral soft ankle restraints removed. Wife at bedside.

## 2025-01-15 NOTE — PROGRESS NOTES
Patient ate little of breakfast and refused lunch thus far this shift. Patient resting in bed eyes closed no behaviors observed. Restraints removed. Wife at bedside. MD made aware.

## 2025-01-15 NOTE — PROGRESS NOTES
Patient is agitated at shift change, and is trying to get out of bed. Request for restraint order is placed by telephone and approved by MD Yohan Flowers who is on call. Patient is placed in bilateral ankle restraint

## 2025-01-16 ENCOUNTER — CARE COORDINATION (OUTPATIENT)
Dept: CASE MANAGEMENT | Age: 85
End: 2025-01-16

## 2025-01-16 LAB
BACTERIA BLD CULT ORG #2: NORMAL
BACTERIA BLD CULT: NORMAL

## 2025-01-16 NOTE — CARE COORDINATION
Care Transitions Note    Initial Call - Call within 2 business days of discharge: Yes    Patient Current Location:  [place]    Care Transition Nurse contacted the spouse/partner  by telephone to perform post hospital discharge assessment, verified name and  as identifiers. Provided introduction to self, and explanation of the Care Transition Nurse role.     Patient: Aki Payne    Patient : 1940   MRN: [id number]    Reason for Admission: AMS  Discharge Date: 1/15/25  RURS: Readmission Risk Score: 16.2      Last Discharge Facility       Date Complaint Diagnosis Description Type Department Provider    25 Urinary Tract Infection Acute urinary tract infection ... ED to Hosp-Admission (Discharged) (ADMITTED) Zackary Mcelroy MD            Was this an external facility discharge? No    Additional needs identified to be addressed with provider   No needs identified             Method of communication with provider: none.      Care Summary Note: Spoke briefly with Jess (Leela) - wife - HIPAA verified. She declines participating in a CT program - completing the CT discharge phone call - or any additional CT outreach. She states [facility] is active. Jess states Aki is still sleeping. She did take writer's contact info.          Follow Up Appointment:   Patient does not have a follow up appointment scheduled at time of call.  Currently there is no HFU appt scheduled with the PCP. Writer will send a message to the office pool to facilitate.    Future Appointments         Provider Specialty Dept Phone    2025 1:30 PM Melanie Hanna APRN - CNP Orthopedic Surgery [phone]            Patient closed (declined further JAZMYN services) from the Care Transitions program on 2025.    Handoff:   Patient was not referred to the ACM team due to patient declined services.      Patient has agreed to contact primary care provider and/or specialist for any further questions, concerns, or

## 2025-01-16 NOTE — PROGRESS NOTES
CLINICAL PHARMACY NOTE: MEDS TO BEDS    Total # of Prescriptions Filled: 2   The following medications were delivered to the patient:  Discharge Medication List as of 1/15/2025  3:34 PM        START taking these medications    Details   amLODIPine (NORVASC) 2.5 MG tablet Take 1 tablet by mouth daily, Disp-30 tablet, R-3Normal      amoxicillin (AMOXIL) 875 MG tablet Take 1 tablet by mouth 2 times daily for 5 days, Disp-10 tablet, R-0Normal               Additional Documentation:gave meds to spouse in  room , told for home use only

## 2025-01-21 PROBLEM — F02.B11 MODERATE LATE ONSET ALZHEIMER'S DEMENTIA WITH AGITATION (HCC): Status: ACTIVE | Noted: 2017-06-09

## 2025-01-23 NOTE — PROGRESS NOTES
Physician Progress Note      PATIENT:               RAFAEL PENNINGTON  CSN #:                  [id number]  :                       1940  ADMIT DATE:       2025 3:48 PM  DISCH DATE:        1/15/2025 6:07 PM  RESPONDING  PROVIDER #:        Zackary Whitman MD          QUERY TEXT:    Pt admitted with UTI and has encephalopathy documented. If possible, please   document in progress notes and discharge summary further specificity regarding   the type of encephalopathy:    The medical record reflects the following:  Risk Factors: UTI, dementia    Clinical Indicators:  HP- \" The urine culture grew Enterococcus and   Klebsiella.  This is sensitive to Augmentin and penicillin.  The patient was   not being treated and the weekend developed worsened mental state, acutely   worse confusion.  The patient is very advanced with his dementia but became   much worse from a behavior standpoint over the weekend.  The patient was   directed to the emergency room for evaluation and has been admitted for   treatment of this urinary tract infection. The wife had a copy of the urinary   culture results and this was reviewed this morning.   The   Encephalopathy due to infection  Acute encephalopathy due to the infection is seemingly improved already.\"  1/15 PN- \"He does appear to be at his mental status baseline.\"    Treatment: labs, IV Zosyn and Unasyn  Options provided:  -- Metabolic encephalopathy due to UTI  -- Encephalopathy due to progression or exacerbation of dementia  -- Other - I will add my own diagnosis  -- Disagree - Not applicable / Not valid  -- Disagree - Clinically unable to determine / Unknown  -- Refer to Clinical Documentation Reviewer    PROVIDER RESPONSE TEXT:    This patient has metabolic encephalopathy due to UTI    Query created by: Johanna Herrera on 2025 1:10 PM      Electronically signed by:  Zackary Whitman MD 2025 8:18 AM

## 2025-01-29 ENCOUNTER — OFFICE VISIT (OUTPATIENT)
Dept: ORTHOPEDIC SURGERY | Age: 85
End: 2025-01-29

## 2025-01-29 VITALS — WEIGHT: 183 LBS | HEIGHT: 69 IN | BODY MASS INDEX: 27.11 KG/M2

## 2025-01-29 DIAGNOSIS — S42.292A OTHER CLOSED DISPLACED FRACTURE OF PROXIMAL END OF LEFT HUMERUS, INITIAL ENCOUNTER: Primary | ICD-10-CM

## 2025-01-29 PROCEDURE — 99024 POSTOP FOLLOW-UP VISIT: CPT | Performed by: NURSE PRACTITIONER

## 2025-01-29 NOTE — PROGRESS NOTES
CHIEF COMPLAINT: Left shoulder pain/ moderately displaced proximal humerus fracture.    DATE OF INJURY: 2024, DOT: 2024    HISTORY:  Mr. Payne is a 84 y.o.  male right handed who presents today for follow up evaluation of a left shoulder injury.  The patient reports that this injury occurred when he fell.  He was first seen and evaluated in [facility], when he was x-rayed and splinted, and Dr Buck saw patient in the hospital. The patient denies any other injuries. Rates pain a 0/10 VAS and doing well. She is working on ROM on her own.   No numbness or tingling sensation.      Past Medical History:   Diagnosis Date    Bladder cancer ([facility])     Dr Carrasquillo    Blood in urine 2022    CAD (coronary artery disease) 2010    cardiac cath- non obstructive    Colon polyps     by colonoscopy    Dementia ([facility]) 2017    DNR (do not resuscitate)     Hyperlipidemia        Past Surgical History:   Procedure Laterality Date    BLADDER REMOVAL      with ileal conduit    COLONOSCOPY  2010    Due in     COLONOSCOPY  10/05/2016    [facility]    CYSTOSCOPY N/A 10/4/2022    ENDOSCOPIC EXPLORATION OF THE ILEAL CONDUIT WITH BIOPSY AND FULGERATION performed by Joseph Elkins MD at [facility] OR    OTHER SURGICAL HISTORY  2013, 2014    Ileal conduit dilatation ([facility])    TIBIA FRACTURE SURGERY Right        Social History     Socioeconomic History    Marital status:      Spouse name: Not on file    Number of children: 2    Years of education: Not on file    Highest education level: Not on file   Occupational History    Not on file   Tobacco Use    Smoking status: Former     Current packs/day: 0.00     Average packs/day: 1 pack/day for 12.0 years (12.0 ttl pk-yrs)     Types: Cigarettes     Start date: 1957     Quit date: 1969     Years since quittin.1    Smokeless tobacco: Never   Vaping Use    Vaping status: Never Used   Substance and Sexual Activity    Alcohol use: Yes

## 2025-02-11 PROBLEM — N39.0 UTI (URINARY TRACT INFECTION): Status: RESOLVED | Noted: 2025-01-12 | Resolved: 2025-02-11

## 2025-03-25 ENCOUNTER — TELEPHONE (OUTPATIENT)
Dept: ORTHOPEDIC SURGERY | Age: 85
End: 2025-03-25

## 2025-03-25 NOTE — TELEPHONE ENCOUNTER
WIFE CALLED WANTS TO KNOW IF PATIENT I REALLY NEEDING AN OFFICE VISIT. STATES NO PAIN AND PATIENT IS USING ARM COMPLETELY. PATIENT HAS DEMENTIA AND IT IS HARD BRINGING HIM TO THE OFFICE IF ITS NOT REALLY NEEDED. PLEASE CALL TO ADVISE [phone]

## 2025-03-25 NOTE — TELEPHONE ENCOUNTER
SW patient's wife. She stated that the patient has dementia. She stated he is difficult to get ready to leave the house. She stated he is not complaining of pain and has resumed ADLs. She cancelled apt and will call back if she feels he needs to be seen.

## 2025-07-19 ENCOUNTER — APPOINTMENT (OUTPATIENT)
Dept: GENERAL RADIOLOGY | Age: 85
DRG: 871 | End: 2025-07-19
Payer: MEDICARE

## 2025-07-19 ENCOUNTER — HOSPITAL ENCOUNTER (INPATIENT)
Age: 85
LOS: 10 days | Discharge: HOME HEALTH | DRG: 871 | End: 2025-07-29
Attending: STUDENT IN AN ORGANIZED HEALTH CARE EDUCATION/TRAINING PROGRAM | Admitting: INTERNAL MEDICINE
Payer: MEDICARE

## 2025-07-19 ENCOUNTER — APPOINTMENT (OUTPATIENT)
Dept: CT IMAGING | Age: 85
DRG: 871 | End: 2025-07-19
Payer: MEDICARE

## 2025-07-19 ENCOUNTER — TELEPHONE (OUTPATIENT)
Dept: FAMILY MEDICINE CLINIC | Age: 85
End: 2025-07-19

## 2025-07-19 DIAGNOSIS — R41.82 ALTERED MENTAL STATUS, UNSPECIFIED ALTERED MENTAL STATUS TYPE: Primary | ICD-10-CM

## 2025-07-19 DIAGNOSIS — E83.52 HYPERCALCEMIA: ICD-10-CM

## 2025-07-19 PROBLEM — N39.0 COMPLICATED UTI (URINARY TRACT INFECTION): Status: ACTIVE | Noted: 2025-07-19

## 2025-07-19 LAB
ALBUMIN SERPL-MCNC: 4 G/DL (ref 3.4–5)
ALBUMIN/GLOB SERPL: 1.1 {RATIO} (ref 1.1–2.2)
ALP SERPL-CCNC: 92 U/L (ref 40–129)
ALT SERPL-CCNC: 11 U/L (ref 10–40)
ANION GAP SERPL CALCULATED.3IONS-SCNC: 11 MMOL/L (ref 3–16)
ANION GAP SERPL CALCULATED.3IONS-SCNC: 15 MMOL/L (ref 3–16)
AST SERPL-CCNC: 22 U/L (ref 15–37)
BACTERIA URNS QL MICRO: ABNORMAL /HPF
BASOPHILS # BLD: 0 K/UL (ref 0–0.2)
BASOPHILS NFR BLD: 0.5 %
BETA-HYDROXYBUTYRATE: 0.17 MMOL/L (ref 0–0.27)
BILIRUB SERPL-MCNC: 0.7 MG/DL (ref 0–1)
BILIRUB UR QL STRIP.AUTO: NEGATIVE
BUN SERPL-MCNC: 42 MG/DL (ref 7–20)
BUN SERPL-MCNC: 46 MG/DL (ref 7–20)
CA-I BLD-SCNC: 1.3 MMOL/L (ref 1.12–1.32)
CALCIUM SERPL-MCNC: 10.9 MG/DL (ref 8.3–10.6)
CALCIUM SERPL-MCNC: 11 MG/DL (ref 8.3–10.6)
CHLORIDE SERPL-SCNC: 118 MMOL/L (ref 99–110)
CHLORIDE SERPL-SCNC: 118 MMOL/L (ref 99–110)
CLARITY UR: ABNORMAL
CO2 SERPL-SCNC: 19 MMOL/L (ref 21–32)
CO2 SERPL-SCNC: 21 MMOL/L (ref 21–32)
COARSE GRAN CASTS #/AREA URNS LPF: ABNORMAL /LPF (ref 0–2)
COLOR UR: YELLOW
CREAT SERPL-MCNC: 1 MG/DL (ref 0.8–1.3)
CREAT SERPL-MCNC: 1.1 MG/DL (ref 0.8–1.3)
DEPRECATED RDW RBC AUTO: 15.8 % (ref 12.4–15.4)
EOSINOPHIL # BLD: 0.2 K/UL (ref 0–0.6)
EOSINOPHIL NFR BLD: 2.6 %
EPI CELLS #/AREA URNS HPF: ABNORMAL /HPF (ref 0–5)
GFR SERPLBLD CREATININE-BSD FMLA CKD-EPI: 66 ML/MIN/{1.73_M2}
GFR SERPLBLD CREATININE-BSD FMLA CKD-EPI: 74 ML/MIN/{1.73_M2}
GLUCOSE SERPL-MCNC: 114 MG/DL (ref 70–99)
GLUCOSE SERPL-MCNC: 95 MG/DL (ref 70–99)
GLUCOSE UR STRIP.AUTO-MCNC: NEGATIVE MG/DL
HCT VFR BLD AUTO: 36.8 % (ref 40.5–52.5)
HGB BLD-MCNC: 12.3 G/DL (ref 13.5–17.5)
HGB UR QL STRIP.AUTO: ABNORMAL
KETONES UR STRIP.AUTO-MCNC: NEGATIVE MG/DL
LACTATE BLDV-SCNC: 2.4 MMOL/L (ref 0.4–2)
LEUKOCYTE ESTERASE UR QL STRIP.AUTO: ABNORMAL
LYMPHOCYTES # BLD: 1.3 K/UL (ref 1–5.1)
LYMPHOCYTES NFR BLD: 23.2 %
MAGNESIUM SERPL-MCNC: 2.04 MG/DL (ref 1.8–2.4)
MAGNESIUM SERPL-MCNC: 2.25 MG/DL (ref 1.8–2.4)
MCH RBC QN AUTO: 29.2 PG (ref 26–34)
MCHC RBC AUTO-ENTMCNC: 33.3 G/DL (ref 31–36)
MCV RBC AUTO: 87.8 FL (ref 80–100)
MONOCYTES # BLD: 0.4 K/UL (ref 0–1.3)
MONOCYTES NFR BLD: 6.7 %
MUCOUS THREADS #/AREA URNS LPF: ABNORMAL /LPF
NEUTROPHILS # BLD: 3.9 K/UL (ref 1.7–7.7)
NEUTROPHILS NFR BLD: 67 %
NITRITE UR QL STRIP.AUTO: POSITIVE
PH BLDV: 7.52 [PH] (ref 7.35–7.45)
PH UR STRIP.AUTO: 7.5 [PH] (ref 5–8)
PLATELET # BLD AUTO: 216 K/UL (ref 135–450)
PMV BLD AUTO: 8 FL (ref 5–10.5)
POTASSIUM SERPL-SCNC: 3.4 MMOL/L (ref 3.5–5.1)
POTASSIUM SERPL-SCNC: 4.1 MMOL/L (ref 3.5–5.1)
PROT SERPL-MCNC: 7.6 G/DL (ref 6.4–8.2)
PROT UR STRIP.AUTO-MCNC: 100 MG/DL
RBC # BLD AUTO: 4.19 M/UL (ref 4.2–5.9)
RBC #/AREA URNS HPF: ABNORMAL /HPF (ref 0–4)
SODIUM SERPL-SCNC: 150 MMOL/L (ref 136–145)
SODIUM SERPL-SCNC: 152 MMOL/L (ref 136–145)
SP GR UR STRIP.AUTO: 1.01 (ref 1–1.03)
TSH SERPL DL<=0.005 MIU/L-ACNC: 1.75 UIU/ML (ref 0.27–4.2)
UA COMPLETE W REFLEX CULTURE PNL UR: YES
UA DIPSTICK W REFLEX MICRO PNL UR: YES
URN SPEC COLLECT METH UR: ABNORMAL
UROBILINOGEN UR STRIP-ACNC: 0.2 E.U./DL
WBC # BLD AUTO: 5.8 K/UL (ref 4–11)
WBC #/AREA URNS HPF: ABNORMAL /HPF (ref 0–5)

## 2025-07-19 PROCEDURE — 80053 COMPREHEN METABOLIC PANEL: CPT

## 2025-07-19 PROCEDURE — 82330 ASSAY OF CALCIUM: CPT

## 2025-07-19 PROCEDURE — 82010 KETONE BODYS QUAN: CPT

## 2025-07-19 PROCEDURE — 71045 X-RAY EXAM CHEST 1 VIEW: CPT

## 2025-07-19 PROCEDURE — 2580000003 HC RX 258: Performed by: INTERNAL MEDICINE

## 2025-07-19 PROCEDURE — 1200000000 HC SEMI PRIVATE

## 2025-07-19 PROCEDURE — 83735 ASSAY OF MAGNESIUM: CPT

## 2025-07-19 PROCEDURE — 80165 DIPROPYLACETIC ACID FREE: CPT

## 2025-07-19 PROCEDURE — 99285 EMERGENCY DEPT VISIT HI MDM: CPT

## 2025-07-19 PROCEDURE — 83605 ASSAY OF LACTIC ACID: CPT

## 2025-07-19 PROCEDURE — 96372 THER/PROPH/DIAG INJ SC/IM: CPT

## 2025-07-19 PROCEDURE — 80164 ASSAY DIPROPYLACETIC ACD TOT: CPT

## 2025-07-19 PROCEDURE — 84443 ASSAY THYROID STIM HORMONE: CPT

## 2025-07-19 PROCEDURE — 96361 HYDRATE IV INFUSION ADD-ON: CPT

## 2025-07-19 PROCEDURE — 96374 THER/PROPH/DIAG INJ IV PUSH: CPT

## 2025-07-19 PROCEDURE — 87086 URINE CULTURE/COLONY COUNT: CPT

## 2025-07-19 PROCEDURE — 2500000003 HC RX 250 WO HCPCS: Performed by: INTERNAL MEDICINE

## 2025-07-19 PROCEDURE — 70450 CT HEAD/BRAIN W/O DYE: CPT

## 2025-07-19 PROCEDURE — 6370000000 HC RX 637 (ALT 250 FOR IP): Performed by: INTERNAL MEDICINE

## 2025-07-19 PROCEDURE — 6360000002 HC RX W HCPCS

## 2025-07-19 PROCEDURE — 2500000003 HC RX 250 WO HCPCS

## 2025-07-19 PROCEDURE — 6360000002 HC RX W HCPCS: Performed by: INTERNAL MEDICINE

## 2025-07-19 PROCEDURE — 85025 COMPLETE CBC W/AUTO DIFF WBC: CPT

## 2025-07-19 PROCEDURE — 93005 ELECTROCARDIOGRAM TRACING: CPT

## 2025-07-19 PROCEDURE — 36415 COLL VENOUS BLD VENIPUNCTURE: CPT

## 2025-07-19 PROCEDURE — 81001 URINALYSIS AUTO W/SCOPE: CPT

## 2025-07-19 PROCEDURE — 2580000003 HC RX 258

## 2025-07-19 RX ORDER — ACETAMINOPHEN 500 MG
1000 TABLET ORAL EVERY 8 HOURS PRN
Status: DISCONTINUED | OUTPATIENT
Start: 2025-07-19 | End: 2025-07-29 | Stop reason: HOSPADM

## 2025-07-19 RX ORDER — SODIUM CHLORIDE, SODIUM LACTATE, POTASSIUM CHLORIDE, AND CALCIUM CHLORIDE .6; .31; .03; .02 G/100ML; G/100ML; G/100ML; G/100ML
1000 INJECTION, SOLUTION INTRAVENOUS ONCE
Status: COMPLETED | OUTPATIENT
Start: 2025-07-19 | End: 2025-07-19

## 2025-07-19 RX ORDER — SODIUM CHLORIDE 9 MG/ML
INJECTION, SOLUTION INTRAVENOUS PRN
Status: DISCONTINUED | OUTPATIENT
Start: 2025-07-19 | End: 2025-07-29 | Stop reason: HOSPADM

## 2025-07-19 RX ORDER — SODIUM CHLORIDE 0.9 % (FLUSH) 0.9 %
5-40 SYRINGE (ML) INJECTION PRN
Status: DISCONTINUED | OUTPATIENT
Start: 2025-07-19 | End: 2025-07-29 | Stop reason: HOSPADM

## 2025-07-19 RX ORDER — QUETIAPINE FUMARATE 25 MG/1
25 TABLET, FILM COATED ORAL 2 TIMES DAILY
Status: DISCONTINUED | OUTPATIENT
Start: 2025-07-19 | End: 2025-07-20

## 2025-07-19 RX ORDER — POLYETHYLENE GLYCOL 3350 17 G/17G
17 POWDER, FOR SOLUTION ORAL DAILY PRN
Status: DISCONTINUED | OUTPATIENT
Start: 2025-07-19 | End: 2025-07-29 | Stop reason: HOSPADM

## 2025-07-19 RX ORDER — CITALOPRAM HYDROBROMIDE 10 MG/1
10 TABLET ORAL DAILY
Status: DISCONTINUED | OUTPATIENT
Start: 2025-07-19 | End: 2025-07-29 | Stop reason: HOSPADM

## 2025-07-19 RX ORDER — HALOPERIDOL 5 MG/ML
2 INJECTION INTRAMUSCULAR ONCE
Status: COMPLETED | OUTPATIENT
Start: 2025-07-19 | End: 2025-07-19

## 2025-07-19 RX ORDER — SODIUM CHLORIDE, SODIUM LACTATE, POTASSIUM CHLORIDE, CALCIUM CHLORIDE 600; 310; 30; 20 MG/100ML; MG/100ML; MG/100ML; MG/100ML
INJECTION, SOLUTION INTRAVENOUS CONTINUOUS
Status: DISCONTINUED | OUTPATIENT
Start: 2025-07-19 | End: 2025-07-20

## 2025-07-19 RX ORDER — SODIUM CHLORIDE, SODIUM LACTATE, POTASSIUM CHLORIDE, AND CALCIUM CHLORIDE .6; .31; .03; .02 G/100ML; G/100ML; G/100ML; G/100ML
1000 INJECTION, SOLUTION INTRAVENOUS ONCE
Status: CANCELLED | OUTPATIENT
Start: 2025-07-19 | End: 2025-07-19

## 2025-07-19 RX ORDER — SODIUM CHLORIDE 0.9 % (FLUSH) 0.9 %
5-40 SYRINGE (ML) INJECTION EVERY 12 HOURS SCHEDULED
Status: DISCONTINUED | OUTPATIENT
Start: 2025-07-19 | End: 2025-07-29 | Stop reason: HOSPADM

## 2025-07-19 RX ORDER — HALOPERIDOL 5 MG/ML
1 INJECTION INTRAMUSCULAR EVERY 6 HOURS PRN
Status: DISCONTINUED | OUTPATIENT
Start: 2025-07-19 | End: 2025-07-23

## 2025-07-19 RX ORDER — DIVALPROEX SODIUM 125 MG/1
125 CAPSULE, COATED PELLETS ORAL 3 TIMES DAILY
Status: DISCONTINUED | OUTPATIENT
Start: 2025-07-19 | End: 2025-07-29 | Stop reason: HOSPADM

## 2025-07-19 RX ORDER — ENOXAPARIN SODIUM 100 MG/ML
40 INJECTION SUBCUTANEOUS NIGHTLY
Status: DISCONTINUED | OUTPATIENT
Start: 2025-07-19 | End: 2025-07-29 | Stop reason: HOSPADM

## 2025-07-19 RX ORDER — 0.9 % SODIUM CHLORIDE 0.9 %
1000 INTRAVENOUS SOLUTION INTRAVENOUS ONCE
Status: COMPLETED | OUTPATIENT
Start: 2025-07-19 | End: 2025-07-19

## 2025-07-19 RX ADMIN — WATER 1000 MG: 1 INJECTION INTRAMUSCULAR; INTRAVENOUS; SUBCUTANEOUS at 17:31

## 2025-07-19 RX ADMIN — CITALOPRAM HYDROBROMIDE 10 MG: 10 TABLET ORAL at 17:32

## 2025-07-19 RX ADMIN — ACETAMINOPHEN 1000 MG: 500 TABLET ORAL at 17:32

## 2025-07-19 RX ADMIN — Medication 9 MG: at 20:57

## 2025-07-19 RX ADMIN — WATER 1000 MG: 1 INJECTION INTRAMUSCULAR; INTRAVENOUS; SUBCUTANEOUS at 14:43

## 2025-07-19 RX ADMIN — QUETIAPINE FUMARATE 25 MG: 25 TABLET ORAL at 20:57

## 2025-07-19 RX ADMIN — SODIUM CHLORIDE 1000 ML: 0.9 INJECTION, SOLUTION INTRAVENOUS at 13:33

## 2025-07-19 RX ADMIN — HALOPERIDOL LACTATE 2 MG: 5 INJECTION, SOLUTION INTRAMUSCULAR at 14:43

## 2025-07-19 RX ADMIN — DIVALPROEX SODIUM 125 MG: 125 CAPSULE, COATED PELLETS ORAL at 20:57

## 2025-07-19 RX ADMIN — SODIUM CHLORIDE, SODIUM LACTATE, POTASSIUM CHLORIDE, AND CALCIUM CHLORIDE: .6; .31; .03; .02 INJECTION, SOLUTION INTRAVENOUS at 17:37

## 2025-07-19 RX ADMIN — SODIUM CHLORIDE, SODIUM LACTATE, POTASSIUM CHLORIDE, AND CALCIUM CHLORIDE 1000 ML: .6; .31; .03; .02 INJECTION, SOLUTION INTRAVENOUS at 20:40

## 2025-07-19 RX ADMIN — DIVALPROEX SODIUM 125 MG: 125 CAPSULE, COATED PELLETS ORAL at 17:32

## 2025-07-19 RX ADMIN — ENOXAPARIN SODIUM 40 MG: 100 INJECTION SUBCUTANEOUS at 20:57

## 2025-07-19 RX ADMIN — POTASSIUM BICARBONATE 40 MEQ: 391 TABLET, EFFERVESCENT ORAL at 20:57

## 2025-07-19 ASSESSMENT — PAIN SCALES - PAIN ASSESSMENT IN ADVANCED DEMENTIA (PAINAD)
CONSOLABILITY: NO NEED TO CONSOLE
BREATHING: OCCASIONAL LABORED BREATHING, SHORT PERIOD OF HYPERVENTILATION
BODYLANGUAGE: TENSE, DISTRESSED PACING, FIDGETING
BODYLANGUAGE: TENSE, DISTRESSED PACING, FIDGETING
FACIALEXPRESSION: SAD, FRIGHTENED, FROWN
NEGVOCALIZATION: OCCASIONAL MOAN/GROAN, LOW SPEECH, NEGATIVE/DISAPPROVING QUALITY
FACIALEXPRESSION: FACIAL GRIMACING
BREATHING: OCCASIONAL LABORED BREATHING, SHORT PERIOD OF HYPERVENTILATION
TOTALSCORE: 6
CONSOLABILITY: DISTRACTED OR REASSURED BY VOICE/TOUCH
TOTALSCORE: 3

## 2025-07-19 ASSESSMENT — LIFESTYLE VARIABLES
HOW MANY STANDARD DRINKS CONTAINING ALCOHOL DO YOU HAVE ON A TYPICAL DAY: PATIENT DOES NOT DRINK
HOW OFTEN DO YOU HAVE A DRINK CONTAINING ALCOHOL: NEVER

## 2025-07-19 NOTE — PROGRESS NOTES
Patient arrived to unit, patient family at bedside, patient very anxious, incomprehensible and unable to redirect. Patient would not sit down, so staff could assess and change into gown. Patient constantly wanting to walk in room and wander in halls. Patient was incontinent of bowel upon arrival unit. Patient cleaned up and changed, finally placed into gown. Patient would not let nurse place mepilex on butt continued to pull at buttock area and pull at urostomy. Nurse emptied urostomy. Ordered patient supper. Patient took medications crushed in apple sauce. Patient rested for about 10 minutes. Granddaughter called this nurse back to room, patient became agitated again, pulling at IV and jumping out of bed, trying to walk around in room, patient did have fall precautions in place, and red socks on. Patient currently in chair, ACC now at bedside still working on supper, call light within reach.

## 2025-07-19 NOTE — ED PROVIDER NOTES
This is my BERENICE Supervisory and shared visit note:     This patient was seen by the advanced practice provider.     I personally saw the patient and made/approved the management plan and take responsibility for the patient management.      Briefly, 84 y.o. male presents with agitation and altered mentation.    Focused exam:   Gen: awake, alert, and agitated on examination  CV: RRR w/o MRG  Lungs: CTAB. No incr WOB.   Abdomen: Soft, nontender, nondistended. No rebound/guarding.   Neuro: Moving all extremities, fluent speech, follows commands .  GCS 14    MDM:   Patient is hemodynamically stable upon arrival to the emergency department.  Patient is afebrile.  I received a call from patient's primary care doctor before patient arrived to the emergency department stating that patient had been more more agitated over the last couple days.  It appears patient does have a history of dementia and was currently being treated for urinary tract infection.  Patient received outside labs and shown to be hyponatremic and hypercalcemic.    Update: On my initial evaluation of patient he is agitated, I discussed taking multiple staff members to hold patient down because he is attempting to jump out of bed.  2 mg of Haldol was ordered for agitation.  CMP showed patient was hyponatremic to 150, calcium elevated to 11.0, CBC shows a hemoglobin of 12.3, TSH within normal limits, magnesium within normal limits.  Urinalysis consistent with UTI.  Patient was administered a bolus of fluid for elevated calcium and patient also received ceftriaxone for UTI.  CT of head negative for intracranial bleed.  Chest x-ray negative for any acute cardiopulmonary findings.  Patient was admitted for agitation and altered mental status.    I independently interpreted the following studies: CTA head        CRITICAL CARE  I personally saw the patient and independently provided 10 minutes of non-concurrent critical care out of the total shared critical care

## 2025-07-19 NOTE — ED NOTES
Pt got agitated and started  pulling on cords and and removing cardiac monitoring. Rn contacted PA and staff for assistance. New orders set care continues.

## 2025-07-19 NOTE — PLAN OF CARE
Problem: Discharge Planning  Goal: Discharge to home or other facility with appropriate resources  Outcome: Progressing  Flowsheets (Taken 7/19/2025 1656)  Discharge to home or other facility with appropriate resources:   Identify barriers to discharge with patient and caregiver   Arrange for needed discharge resources and transportation as appropriate   Identify discharge learning needs (meds, wound care, etc)     Problem: Safety - Adult  Goal: Free from fall injury  Outcome: Progressing  Flowsheets (Taken 7/19/2025 1708)  Free From Fall Injury: Instruct family/caregiver on patient safety      Called patient and she was advised of Dr. Christian's medication recommendations, see previous note in the encounter, patient verbalized understanding and will call back with any further questions or concerns.

## 2025-07-19 NOTE — ED NOTES
ED TO INPATIENT SBAR HANDOFF    Patient Name: Aki Payne   Preferred Name: Aki  : 1940  84 y.o.   Family/Caregiver Present: no   Code Status Order: DNR-CCA  PO Status: NPO:No  Telemetry Order: No  C-SSRS:    Sitter no    Restraints:     Sepsis Risk Score      Situation  Chief Complaint   Patient presents with    Abnormal  labs     [facility] medics with pt from home as pcp called family sts pt has abnormal labs and more confused than usual, recent dx of UTI, hx of dementia, medics report pt GCS 12; \"inappropriate words\", family enroute to ed     Brief Description of Patient's Condition: sleep, confused, Pt got agitated at one point gave haldol IM. Pt calm sleeping at this time. Wife at bedside.   Mental Status: disoriented  Arrived from:Home  Imaging:   CT HEAD WO CONTRAST   Final Result   1. No acute intracranial abnormality.   2. Nonspecific small-vessel and involutional changes of age within the   expected appearance for age.         XR CHEST PORTABLE   Final Result   1. No acute cardiopulmonary process.   2. No significant interval change.           Abnormal labs:   Abnormal Labs Reviewed   CBC WITH AUTO DIFFERENTIAL - Abnormal; Notable for the following components:       Result Value    RBC 4.19 (*)     Hemoglobin 12.3 (*)     Hematocrit 36.8 (*)     RDW 15.8 (*)     All other components within normal limits   COMPREHENSIVE METABOLIC PANEL W/ REFLEX TO MG FOR LOW K - Abnormal; Notable for the following components:    Sodium 150 (*)     Chloride 118 (*)     Glucose 114 (*)     BUN 46 (*)     Calcium 11.0 (*)     All other components within normal limits   URINALYSIS WITH REFLEX TO CULTURE - Abnormal; Notable for the following components:    Clarity, UA TURBID (*)     Blood, Urine MODERATE (*)     Protein,  (*)     Nitrite, Urine POSITIVE (*)     Leukocyte Esterase, Urine LARGE (*)     All other components within normal limits   MICROSCOPIC URINALYSIS - Abnormal; Notable for the following  injection 40 mg (has no administration in time range)   polyethylene glycol (GLYCOLAX) packet 17 g (has no administration in time range)   acetaminophen (TYLENOL) tablet 1,000 mg (has no administration in time range)   lactated ringers infusion (has no administration in time range)   melatonin tablet 9 mg (has no administration in time range)   haloperidol lactate (HALDOL) injection 1 mg (has no administration in time range)   cefTRIAXone (ROCEPHIN) 2,000 mg in sodium chloride 0.9 % 50 mL IVPB (addEASE) (has no administration in time range)   citalopram (CELEXA) tablet 10 mg (has no administration in time range)   divalproex (DEPAKOTE SPRINKLE) DR capsule 125 mg (has no administration in time range)   QUEtiapine (SEROQUEL) tablet 25 mg (has no administration in time range)   cefTRIAXone (ROCEPHIN) 1,000 mg in sterile water 10 mL IV syringe (has no administration in time range)   sodium chloride 0.9 % bolus 1,000 mL (0 mLs IntraVENous Stopped 7/19/25 1452)   cefTRIAXone (ROCEPHIN) 1,000 mg in sterile water 10 mL IV syringe (1,000 mg IntraVENous Given 7/19/25 1443)   haloperidol lactate (HALDOL) injection 2 mg (2 mg IntraMUSCular Given 7/19/25 1443)     Last documented pain medication administration:   Pertinent or High Risk Medications/Drips: no   If Yes, please provide details:   Blood Product Administration: no  If Yes, please provide details:   Process Protocols/Bundles: N/A    Recommendation  Incomplete STAT orders:   Overdue Medications:   Patient Belongings:    Additional Comments:   If any further questions, please call Sending RN at [phone]      Admitting Unit Notification  Name of person notified and time:       Electronically signed by: Electronically signed by Mohini Jade RN on 7/19/2025 at 3:35 PM

## 2025-07-19 NOTE — TELEPHONE ENCOUNTER
Thank you for calling them.  I actually just got off the phone with them as well and his wife is going to take him to the emergency room.  I agree with you.  Joseph

## 2025-07-19 NOTE — TELEPHONE ENCOUNTER
Called and spoke to patient's wife Jess. She reports that Aki is currently being treated for a UTI and has dementia, so his confusion is worse than his baseline. Discussed the concerning labs with dehydration and elevated calcium level. She does not want to take him to the ER for further evaluation and treatment. She is going to encourage him to increase his water intake. She will call Dr. Whitman's office on Monday morning and schedule f/u and repeat lab. Advised if he worsens over the weekend to go to ER and she agreed.

## 2025-07-19 NOTE — PROGRESS NOTES
Pharmacy Medication Reconciliation Note     List of medications patient is currently taking is complete based on PCP office note (7/18/25)    Source of information:   1. Reviewed Dr Whitman's note    [unfilled]    Notes regarding home medications:   1.  Seroquel is a new med, written on 7/11/25. Wife was giving at 5 pm and HS. Reported that spouse was slower so she did not give dose on 7/17/25   2.  Started on Cefdinir for suspected UTI at that visit.    Mariela Montez RPH   7/19/2025  3:44 PM

## 2025-07-20 PROBLEM — E87.6 HYPOKALEMIA: Status: ACTIVE | Noted: 2025-07-20

## 2025-07-20 PROBLEM — E86.0 DEHYDRATION: Status: ACTIVE | Noted: 2025-07-20

## 2025-07-20 PROBLEM — Z85.51 HISTORY OF BLADDER CANCER: Status: ACTIVE | Noted: 2025-07-20

## 2025-07-20 PROBLEM — Z98.890 HISTORY OF UROSTOMY: Status: ACTIVE | Noted: 2025-07-20

## 2025-07-20 PROBLEM — A41.9 SEPSIS WITHOUT ACUTE ORGAN DYSFUNCTION (HCC): Status: ACTIVE | Noted: 2025-07-20

## 2025-07-20 PROBLEM — E87.20 LACTIC ACIDOSIS: Status: ACTIVE | Noted: 2025-07-20

## 2025-07-20 PROBLEM — Z71.89 GOALS OF CARE, COUNSELING/DISCUSSION: Status: ACTIVE | Noted: 2025-07-20

## 2025-07-20 PROBLEM — E87.0 HYPERNATREMIA: Status: ACTIVE | Noted: 2025-07-20

## 2025-07-20 PROBLEM — F03.C11 SEVERE DEMENTIA WITH AGITATION (HCC): Status: ACTIVE | Noted: 2025-07-20

## 2025-07-20 LAB
ANION GAP SERPL CALCULATED.3IONS-SCNC: 7 MMOL/L (ref 3–16)
ANION GAP SERPL CALCULATED.3IONS-SCNC: 8 MMOL/L (ref 3–16)
ANION GAP SERPL CALCULATED.3IONS-SCNC: 9 MMOL/L (ref 3–16)
ANION GAP SERPL CALCULATED.3IONS-SCNC: 9 MMOL/L (ref 3–16)
BACTERIA UR CULT: NORMAL
BASOPHILS # BLD: 0.1 K/UL (ref 0–0.2)
BASOPHILS NFR BLD: 1.1 %
BUN SERPL-MCNC: 29 MG/DL (ref 7–20)
BUN SERPL-MCNC: 32 MG/DL (ref 7–20)
BUN SERPL-MCNC: 39 MG/DL (ref 7–20)
BUN SERPL-MCNC: 40 MG/DL (ref 7–20)
CALCIUM SERPL-MCNC: 10.1 MG/DL (ref 8.3–10.6)
CALCIUM SERPL-MCNC: 10.3 MG/DL (ref 8.3–10.6)
CALCIUM SERPL-MCNC: 10.3 MG/DL (ref 8.3–10.6)
CALCIUM SERPL-MCNC: 9.9 MG/DL (ref 8.3–10.6)
CHLORIDE SERPL-SCNC: 119 MMOL/L (ref 99–110)
CHLORIDE SERPL-SCNC: 119 MMOL/L (ref 99–110)
CHLORIDE SERPL-SCNC: 120 MMOL/L (ref 99–110)
CHLORIDE SERPL-SCNC: 121 MMOL/L (ref 99–110)
CO2 SERPL-SCNC: 21 MMOL/L (ref 21–32)
CO2 SERPL-SCNC: 22 MMOL/L (ref 21–32)
CREAT SERPL-MCNC: 0.9 MG/DL (ref 0.8–1.3)
CREAT SERPL-MCNC: 1 MG/DL (ref 0.8–1.3)
DEPRECATED RDW RBC AUTO: 15.1 % (ref 12.4–15.4)
EOSINOPHIL # BLD: 0.2 K/UL (ref 0–0.6)
EOSINOPHIL NFR BLD: 4.2 %
GFR SERPLBLD CREATININE-BSD FMLA CKD-EPI: 74 ML/MIN/{1.73_M2}
GFR SERPLBLD CREATININE-BSD FMLA CKD-EPI: 84 ML/MIN/{1.73_M2}
GLUCOSE BLD-MCNC: 91 MG/DL (ref 70–99)
GLUCOSE SERPL-MCNC: 102 MG/DL (ref 70–99)
GLUCOSE SERPL-MCNC: 104 MG/DL (ref 70–99)
GLUCOSE SERPL-MCNC: 92 MG/DL (ref 70–99)
GLUCOSE SERPL-MCNC: 98 MG/DL (ref 70–99)
HCT VFR BLD AUTO: 31.2 % (ref 40.5–52.5)
HGB BLD-MCNC: 10.3 G/DL (ref 13.5–17.5)
LACTATE BLDV-SCNC: 1 MMOL/L (ref 0.4–2)
LYMPHOCYTES # BLD: 2 K/UL (ref 1–5.1)
LYMPHOCYTES NFR BLD: 35.6 %
MCH RBC QN AUTO: 28.9 PG (ref 26–34)
MCHC RBC AUTO-ENTMCNC: 32.8 G/DL (ref 31–36)
MCV RBC AUTO: 87.9 FL (ref 80–100)
MONOCYTES # BLD: 0.5 K/UL (ref 0–1.3)
MONOCYTES NFR BLD: 8.4 %
NEUTROPHILS # BLD: 2.8 K/UL (ref 1.7–7.7)
NEUTROPHILS NFR BLD: 50.7 %
PERFORMED ON: NORMAL
PLATELET # BLD AUTO: 189 K/UL (ref 135–450)
PMV BLD AUTO: 8 FL (ref 5–10.5)
POTASSIUM SERPL-SCNC: 3.8 MMOL/L (ref 3.5–5.1)
POTASSIUM SERPL-SCNC: 4 MMOL/L (ref 3.5–5.1)
POTASSIUM SERPL-SCNC: 4 MMOL/L (ref 3.5–5.1)
POTASSIUM SERPL-SCNC: 4.1 MMOL/L (ref 3.5–5.1)
RBC # BLD AUTO: 3.55 M/UL (ref 4.2–5.9)
SODIUM SERPL-SCNC: 149 MMOL/L (ref 136–145)
SODIUM SERPL-SCNC: 150 MMOL/L (ref 136–145)
WBC # BLD AUTO: 5.5 K/UL (ref 4–11)

## 2025-07-20 PROCEDURE — 85025 COMPLETE CBC W/AUTO DIFF WBC: CPT

## 2025-07-20 PROCEDURE — 6370000000 HC RX 637 (ALT 250 FOR IP): Performed by: INTERNAL MEDICINE

## 2025-07-20 PROCEDURE — 94760 N-INVAS EAR/PLS OXIMETRY 1: CPT

## 2025-07-20 PROCEDURE — 1200000000 HC SEMI PRIVATE

## 2025-07-20 PROCEDURE — 6360000002 HC RX W HCPCS: Performed by: INTERNAL MEDICINE

## 2025-07-20 PROCEDURE — 2580000003 HC RX 258: Performed by: INTERNAL MEDICINE

## 2025-07-20 PROCEDURE — 36415 COLL VENOUS BLD VENIPUNCTURE: CPT

## 2025-07-20 PROCEDURE — 83605 ASSAY OF LACTIC ACID: CPT

## 2025-07-20 PROCEDURE — 80048 BASIC METABOLIC PNL TOTAL CA: CPT

## 2025-07-20 PROCEDURE — 87040 BLOOD CULTURE FOR BACTERIA: CPT

## 2025-07-20 PROCEDURE — 9990000010 HC NO CHARGE VISIT

## 2025-07-20 PROCEDURE — 99223 1ST HOSP IP/OBS HIGH 75: CPT | Performed by: INTERNAL MEDICINE

## 2025-07-20 PROCEDURE — 2500000003 HC RX 250 WO HCPCS: Performed by: INTERNAL MEDICINE

## 2025-07-20 RX ORDER — DEXTROSE MONOHYDRATE 50 MG/ML
INJECTION, SOLUTION INTRAVENOUS CONTINUOUS
Status: DISCONTINUED | OUTPATIENT
Start: 2025-07-20 | End: 2025-07-22

## 2025-07-20 RX ORDER — SODIUM CHLORIDE, SODIUM LACTATE, POTASSIUM CHLORIDE, AND CALCIUM CHLORIDE .6; .31; .03; .02 G/100ML; G/100ML; G/100ML; G/100ML
1000 INJECTION, SOLUTION INTRAVENOUS ONCE
Status: COMPLETED | OUTPATIENT
Start: 2025-07-20 | End: 2025-07-20

## 2025-07-20 RX ADMIN — SODIUM CHLORIDE, PRESERVATIVE FREE 10 ML: 5 INJECTION INTRAVENOUS at 09:00

## 2025-07-20 RX ADMIN — Medication 3 MG: at 20:42

## 2025-07-20 RX ADMIN — DIVALPROEX SODIUM 125 MG: 125 CAPSULE, COATED PELLETS ORAL at 10:47

## 2025-07-20 RX ADMIN — DEXTROSE: 5 SOLUTION INTRAVENOUS at 17:15

## 2025-07-20 RX ADMIN — SODIUM CHLORIDE, SODIUM LACTATE, POTASSIUM CHLORIDE, AND CALCIUM CHLORIDE 1000 ML: .6; .31; .03; .02 INJECTION, SOLUTION INTRAVENOUS at 06:46

## 2025-07-20 RX ADMIN — SODIUM CHLORIDE, SODIUM LACTATE, POTASSIUM CHLORIDE, AND CALCIUM CHLORIDE: .6; .31; .03; .02 INJECTION, SOLUTION INTRAVENOUS at 07:51

## 2025-07-20 RX ADMIN — ENOXAPARIN SODIUM 40 MG: 100 INJECTION SUBCUTANEOUS at 20:42

## 2025-07-20 RX ADMIN — DIVALPROEX SODIUM 125 MG: 125 CAPSULE, COATED PELLETS ORAL at 20:42

## 2025-07-20 RX ADMIN — SODIUM CHLORIDE, SODIUM LACTATE, POTASSIUM CHLORIDE, AND CALCIUM CHLORIDE: .6; .31; .03; .02 INJECTION, SOLUTION INTRAVENOUS at 06:00

## 2025-07-20 RX ADMIN — CITALOPRAM HYDROBROMIDE 10 MG: 10 TABLET ORAL at 10:47

## 2025-07-20 RX ADMIN — CEFTRIAXONE SODIUM 2000 MG: 2 INJECTION, POWDER, FOR SOLUTION INTRAMUSCULAR; INTRAVENOUS at 15:07

## 2025-07-20 RX ADMIN — DIVALPROEX SODIUM 125 MG: 125 CAPSULE, COATED PELLETS ORAL at 15:04

## 2025-07-20 RX ADMIN — QUETIAPINE FUMARATE 25 MG: 25 TABLET ORAL at 10:48

## 2025-07-20 ASSESSMENT — PAIN SCALES - PAIN ASSESSMENT IN ADVANCED DEMENTIA (PAINAD)
FACIALEXPRESSION: SMILING OR INEXPRESSIVE
TOTALSCORE: 0
BODYLANGUAGE: RELAXED
BREATHING: NORMAL
CONSOLABILITY: NO NEED TO CONSOLE

## 2025-07-20 NOTE — PROGRESS NOTES
Patient lethargic this AM, patient aroused with chest rub and able to make eye contact and answer with \"okay\", A&O O, patient has dementia and confused at base. VSS patient had Grant hugger this morning due to low body temp, which improving at 97.6. PIV flushed, dressing CDI, Infusing LR at this time. There are no complaints or signs of pain and discomfort at this time. All AM medications taken Crushed with applesauce/pudding without complaint (see eMAR). Patient assisted with two people to standing position and pivited to head of bed to be repositioned, patient very weak at this time, will utilize St[facility] for transfers if needed. Patient will take sips of water, patient is not willing to chew or eat food at this time but tolerated taking medications this morning well. No other needs verbalized at this time. Standard safety precautions in place and call light within reach.

## 2025-07-20 NOTE — PROGRESS NOTES
Physical Therapy  PT referral received and chart reviewed.  PT to bedside; nursing present.  Pt sleeping very soundly; briefly opens eyes although then immediately  drifts back to sleep.  Will follow-up tomorrow as approp.  Lavern Au, PT.

## 2025-07-20 NOTE — PLAN OF CARE
Problem: Discharge Planning  Goal: Discharge to home or other facility with appropriate resources  7/19/2025 2240 by Maria Alejandra Mcintosh RN  Outcome: Progressing  Flowsheets (Taken 7/19/2025 1656 by Greyson Marte, RN)  Discharge to home or other facility with appropriate resources:   Identify barriers to discharge with patient and caregiver   Arrange for needed discharge resources and transportation as appropriate   Identify discharge learning needs (meds, wound care, etc)  7/19/2025 1708 by Greyson Marte, RN  Outcome: Progressing  Flowsheets (Taken 7/19/2025 1656)  Discharge to home or other facility with appropriate resources:   Identify barriers to discharge with patient and caregiver   Arrange for needed discharge resources and transportation as appropriate   Identify discharge learning needs (meds, wound care, etc)     Problem: Safety - Adult  Goal: Free from fall injury  7/19/2025 2240 by Maria Alejandra Mcintosh RN  Outcome: Progressing  Flowsheets  Taken 7/19/2025 2240  Free From Fall Injury:   Based on caregiver fall risk screen, instruct family/caregiver to ask for assistance with transferring infant if caregiver noted to have fall risk factors   Instruct family/caregiver on patient safety  Taken 7/19/2025 2239  Free From Fall Injury:   Instruct family/caregiver on patient safety   Based on caregiver fall risk screen, instruct family/caregiver to ask for assistance with transferring infant if caregiver noted to have fall risk factors  7/19/2025 1708 by Greyson Marte, RN  Outcome: Progressing  Flowsheets (Taken 7/19/2025 1708)  Free From Fall Injury: Instruct family/caregiver on patient safety     Problem: Pain  Goal: Verbalizes/displays adequate comfort level or baseline comfort level  Outcome: Progressing  Flowsheets (Taken 7/19/2025 2240)  Verbalizes/displays adequate comfort level or baseline comfort level:   Encourage patient to monitor pain and request assistance   Assess pain using appropriate pain

## 2025-07-20 NOTE — PROGRESS NOTES
PT Alert but not oriented per this shift. Unable to reorient. Pt not able to voiced needs. Tolerating diet with spoon per PO fluids.  Pt talking medication with no problem.Pt I'mpulsive at beginning of shift and requiring one one one not to walk without help or pull on lines.  Labs called into [facility].  See EMAR with NO. Urostomy in place with yellow., malodorous output. Pt resting of shift. After medications. No further needs voiced at this time. Fall precautions in place. Bed alarm on. Call light within reach. Will continue care. Electronically signed by Maria Alejandra Mcintosh RN on 7/20/2025 at 5:57 AM

## 2025-07-20 NOTE — PROGRESS NOTES
07/20/25 1006   Encounter Summary   Encounter Overview/Reason Advance Care Planning   Service Provided For Patient   Referral/Consult From Nurse   Last Encounter  07/20/25  (pt not A/O, no fam present, talked w/RN for fam DNR discussion to be w/medical staff CL)   Complexity of Encounter Low   Begin Time 0950   End Time  1002   Total Time Calculated 12 min   Advance Care Planning   Type ACP conversation

## 2025-07-20 NOTE — PLAN OF CARE
Problem: Discharge Planning  Goal: Discharge to home or other facility with appropriate resources  7/20/2025 0838 by Анна Smith RN  Outcome: Progressing  Flowsheets (Taken 7/20/2025 0838)  Discharge to home or other facility with appropriate resources:   Identify barriers to discharge with patient and caregiver   Identify discharge learning needs (meds, wound care, etc)   Refer to discharge planning if patient needs post-hospital services based on physician order or complex needs related to functional status, cognitive ability or social support system   Arrange for needed discharge resources and transportation as appropriate  7/19/2025 2240 by Maria Alejandra Mcintosh, RN  Outcome: Progressing  Flowsheets  Taken 7/19/2025 2057 by Maria Alejandra Mcintosh, RN  Discharge to home or other facility with appropriate resources:   Identify barriers to discharge with patient and caregiver   Arrange for needed discharge resources and transportation as appropriate   Identify discharge learning needs (meds, wound care, etc)  Taken 7/19/2025 1656 by Greyson Marte RN  Discharge to home or other facility with appropriate resources:   Identify barriers to discharge with patient and caregiver   Arrange for needed discharge resources and transportation as appropriate   Identify discharge learning needs (meds, wound care, etc)     Problem: Safety - Adult  Goal: Free from fall injury  7/20/2025 0838 by Анна Smith RN  Outcome: Progressing  Flowsheets (Taken 7/19/2025 2240 by Maria Alejandra Mcintosh, RN)  Free From Fall Injury:   Based on caregiver fall risk screen, instruct family/caregiver to ask for assistance with transferring infant if caregiver noted to have fall risk factors   Instruct family/caregiver on patient safety  7/19/2025 2240 by Maria Alejandra Mcintosh, RN  Outcome: Progressing  Flowsheets  Taken 7/19/2025 2240  Free From Fall Injury:   Based on caregiver fall risk screen, instruct family/caregiver to ask for assistance with

## 2025-07-20 NOTE — ED PROVIDER NOTES
Mental Status: He is alert. He is confused.      GCS: GCS eye subscore is 4. GCS verbal subscore is 3. GCS motor subscore is 4.   Psychiatric:         Mood and Affect: Mood normal.         Behavior: Behavior normal.             DIAGNOSTIC RESULTS   LABS:    Labs Reviewed   CBC WITH AUTO DIFFERENTIAL - Abnormal; Notable for the following components:       Result Value    RBC 4.19 (*)     Hemoglobin 12.3 (*)     Hematocrit 36.8 (*)     RDW 15.8 (*)     All other components within normal limits   COMPREHENSIVE METABOLIC PANEL W/ REFLEX TO MG FOR LOW K - Abnormal; Notable for the following components:    Sodium 150 (*)     Chloride 118 (*)     Glucose 114 (*)     BUN 46 (*)     Calcium 11.0 (*)     All other components within normal limits   URINALYSIS WITH REFLEX TO CULTURE - Abnormal; Notable for the following components:    Clarity, UA TURBID (*)     Blood, Urine MODERATE (*)     Protein,  (*)     Nitrite, Urine POSITIVE (*)     Leukocyte Esterase, Urine LARGE (*)     All other components within normal limits   MICROSCOPIC URINALYSIS - Abnormal; Notable for the following components:    Coarse Casts, UA 3-5 (*)     Mucus, UA Rare (*)     WBC, UA 10-20 (*)     Bacteria, UA 3+ (*)     All other components within normal limits   CALCIUM, IONIZED - Abnormal; Notable for the following components:    pH, Julio 7.523 (*)     All other components within normal limits   BASIC METABOLIC PANEL W/ REFLEX TO MG FOR LOW K - Abnormal; Notable for the following components:    Sodium 152 (*)     Potassium reflex Magnesium 3.4 (*)     Chloride 118 (*)     CO2 19 (*)     BUN 42 (*)     Calcium 10.9 (*)     All other components within normal limits   LACTIC ACID - Abnormal; Notable for the following components:    Lactic Acid 2.4 (*)     All other components within normal limits   CULTURE, URINE   MAGNESIUM   TSH REFLEX TO FT4, T3   MAGNESIUM   BETA-HYDROXYBUTYRATE   VALPROIC ACID LEVEL, TOTAL AND FREE   BASIC METABOLIC PANEL W/  REFLEX TO MG FOR LOW K   CBC WITH AUTO DIFFERENTIAL       When ordered only abnormal lab results are displayed. All other labs were within normal range or not returned as of this dictation.    EKG: When ordered, EKG's are interpreted by the Emergency Department Physician in the absence of a cardiologist.  Please see their note for interpretation of EKG.    RADIOLOGY:   Non-plain film images such as CT, Ultrasound and MRI are read by the radiologist.      X-Ray Independently interpreted by me:     Interpretation per the Radiologist below, if available at the time of this note:    CT HEAD WO CONTRAST   Final Result   1. No acute intracranial abnormality.   2. Nonspecific small-vessel and involutional changes of age within the   expected appearance for age.         XR CHEST PORTABLE   Final Result   1. No acute cardiopulmonary process.   2. No significant interval change.           CT HEAD WO CONTRAST  Result Date: 7/19/2025  EXAMINATION: CT OF THE HEAD WITHOUT CONTRAST  7/19/2025 12:59 pm TECHNIQUE: CT of the head was performed without the administration of intravenous contrast. Automated exposure control, iterative reconstruction, and/or weight based adjustment of the mA/kV was utilized to reduce the radiation dose to as low as reasonably achievable. COMPARISON: 12 January 2025 HISTORY: ORDERING SYSTEM PROVIDED HISTORY: AMS TECHNOLOGIST PROVIDED HISTORY: Reason for exam:->AMS Has a \"code stroke\" or \"stroke alert\" been called?->No Decision Support Exception - unselect if not a suspected or confirmed emergency medical condition->Emergency Medical Condition (MA) Reason for Exam: AMS FINDINGS: BRAIN/VENTRICLES: There is no acute intracranial hemorrhage, mass effect or midline shift.  No abnormal extra-axial fluid collection.  The gray-white differentiation is maintained without evidence of an acute infarct.  There is no evidence of hydrocephalus. Decreased attenuation in the deep right and left cerebral white matter.

## 2025-07-20 NOTE — H&P
H&P  [facility] INTERNAL MEDICINE ADMISSION H&P    Patient: RAFAEL PENNINGTON  :  1940  PCP: Zackary Whitman MD    Reason for visit: abnormal labs, somnolence    SUBJECTIVE     History of present illness:  Rafael Pennington is a 85 y.o. male with a history of bladder cx, CAD, dementia, and HLD who presents to the hospital for abnormal labs and somnolence. Pt is unable to provide any hx. He is somnolent and OX0. Wife at bedside provied hx. Pt has had progressive dementia over the last year. Getting severe. Pt having issues with agitation at times and not sleeping at night. Pt has been following w/PCP for this. Recently, about 2 weeks ago, his meds were adjusted. Pt zyprexa was changed to seroquel. He has been on the same doses of depakote and celexa for some time. Ever since this change pt has been more somnolent. Given this, PCP reduced dose on seroquel. This helped, however did not fully resolve the issues. Over these 2 weeks pts oral intake has been poor. He has lost about 15lbs. This Thursday, wife noticed urine in ostomy bag became brown and had a foul odor. She was concerned he had developed another UTI, which is has had recurrence of. Pt has not been complaining of any pain. He has not had any complaints really at all. Just somnolent. Pt was called yesterday and instructed to go to ER given sig abnormal outpt labs.    ED course: tachypnic, otherwise stable. Labs remarkable for hypernatremia and hypercalcemia. TSH wnl. UA showed protein, nitrites, leukocyte esterase, and 10-20 wbc. Urine cx pends. CXR and CTH unremarkable. EKG unremarkable. Pt was given ceftriaxone, haldol, and IVF.    Review of systems: as above    Past Medical History:   Diagnosis Date    Bladder cancer (HCC)     Dr Carrasquillo    Blood in urine 2022    CAD (coronary artery disease) 2010    cardiac cath- non obstructive    Colon polyps 2006    by colonoscopy    Dementia (HCC) 2017    DNR (do not resuscitate)      07/19/25 1732    lactated ringers infusion, , IntraVENous, Continuous, Yohan Flowers M, DO, Last Rate: 75 mL/hr at 07/20/25 0751, New Bag at 07/20/25 0751    haloperidol lactate (HALDOL) injection 1 mg, 1 mg, IntraMUSCular, Q6H PRN, Yohan Flowers M, DO    cefTRIAXone (ROCEPHIN) 2,000 mg in sodium chloride 0.9 % 50 mL IVPB (addEASE), 2,000 mg, IntraVENous, Q24H, Yohan Flowers M, DO    citalopram (CELEXA) tablet 10 mg, 10 mg, Oral, Daily, Yohan Flowers, , 10 mg at 07/20/25 1047    divalproex (DEPAKOTE SPRINKLE) DR capsule 125 mg, 125 mg, Oral, TID, Yohan Flowers, , 125 mg at 07/20/25 1047    potassium bicarb-citric acid (EFFER-K) effervescent tablet 40 mEq, 40 mEq, Oral, Once, Yohan Flowers,   OBJECTIVE     Vitals:  Vitals:    07/20/25 0757 07/20/25 0815 07/20/25 0833 07/20/25 0851   BP:  100/60     Pulse:  56     Resp:       Temp: (!) 96.5 °F (35.8 °C) (!) 96.4 °F (35.8 °C)  97.6 °F (36.4 °C)   TempSrc: Axillary   Axillary   SpO2:  95% 94%    Weight:       Height:         Physical exam:  Constitutional: pt somnolent and OX0, no apparent distress  Lungs: CTA BL  Cardio: RRR, S1S2, no rubs, murmurs, or gallops, 2+ BL radial and dorsalis pedis pulses, trace BL LE edema (chronic)  Abdominal: urostomy site intact, no signs of infxn, dark yellow urine in bag, normoactive BS, no tenderness to palpation  MSK: edema as above  Skin: no rash  Neuro: grossly intact, BL LE sensation intact, pt follows some simple commands, moves all extremities freely    ASSESSMENT AND PLAN      Aki Payne is a 85 y.o. male with a history of bladder cx, CAD, dementia, and HLD who presents to the hospital for abnormal labs and somnolence.    #Hypernatremia  #Hypercalcemia  #Hypokalemia  #Lactic acidosis  #Dehydration  #Acute metabolic encephalopathy  #Severe dementia  #GOC  -pt w/worsening mentation over last 2 weeks  -however seems to be a longer issue w/sig decline over the last yr  -saw PCP 2 weeks ago, meds were adjusted and

## 2025-07-20 NOTE — PROGRESS NOTES
New labs called into [facility]. Pt cold and diaphoretic this AM. Blood glucose was 91. VSS with axillary. 94.1. Rectal is 90.1. MD Flowers aware of axillary. Pt placed on estephanie hugger. Pt awakening to stimulation at this time. Fall precautions in place. Bed alarm on. Call light within reach. Will continue are. Electronically signed by Maria Alejandra Mcintosh RN on 7/20/2025 at 6:44 AM

## 2025-07-21 PROBLEM — R41.82 ALTERED MENTAL STATUS: Status: ACTIVE | Noted: 2025-07-21

## 2025-07-21 LAB
ANION GAP SERPL CALCULATED.3IONS-SCNC: 10 MMOL/L (ref 3–16)
ANION GAP SERPL CALCULATED.3IONS-SCNC: 16 MMOL/L (ref 3–16)
ANION GAP SERPL CALCULATED.3IONS-SCNC: 9 MMOL/L (ref 3–16)
BASOPHILS # BLD: 0.1 K/UL (ref 0–0.2)
BASOPHILS NFR BLD: 1.1 %
BUN SERPL-MCNC: 22 MG/DL (ref 7–20)
BUN SERPL-MCNC: 26 MG/DL (ref 7–20)
BUN SERPL-MCNC: 28 MG/DL (ref 7–20)
CALCIUM SERPL-MCNC: 10 MG/DL (ref 8.3–10.6)
CALCIUM SERPL-MCNC: 10.6 MG/DL (ref 8.3–10.6)
CALCIUM SERPL-MCNC: 9.9 MG/DL (ref 8.3–10.6)
CHLORIDE SERPL-SCNC: 109 MMOL/L (ref 99–110)
CHLORIDE SERPL-SCNC: 114 MMOL/L (ref 99–110)
CHLORIDE SERPL-SCNC: 116 MMOL/L (ref 99–110)
CO2 SERPL-SCNC: 19 MMOL/L (ref 21–32)
CO2 SERPL-SCNC: 21 MMOL/L (ref 21–32)
CO2 SERPL-SCNC: 24 MMOL/L (ref 21–32)
CREAT SERPL-MCNC: 0.9 MG/DL (ref 0.8–1.3)
DEPRECATED RDW RBC AUTO: 14.8 % (ref 12.4–15.4)
EKG ATRIAL RATE: 71 BPM
EKG DIAGNOSIS: NORMAL
EKG P AXIS: 57 DEGREES
EKG P-R INTERVAL: 180 MS
EKG Q-T INTERVAL: 458 MS
EKG QRS DURATION: 100 MS
EKG QTC CALCULATION (BAZETT): 497 MS
EKG R AXIS: -9 DEGREES
EKG T AXIS: -2 DEGREES
EKG VENTRICULAR RATE: 71 BPM
EOSINOPHIL # BLD: 0.3 K/UL (ref 0–0.6)
EOSINOPHIL NFR BLD: 4.6 %
GFR SERPLBLD CREATININE-BSD FMLA CKD-EPI: 84 ML/MIN/{1.73_M2}
GLUCOSE SERPL-MCNC: 93 MG/DL (ref 70–99)
GLUCOSE SERPL-MCNC: 93 MG/DL (ref 70–99)
GLUCOSE SERPL-MCNC: 98 MG/DL (ref 70–99)
HCT VFR BLD AUTO: 31.8 % (ref 40.5–52.5)
HGB BLD-MCNC: 10.8 G/DL (ref 13.5–17.5)
LYMPHOCYTES # BLD: 1.7 K/UL (ref 1–5.1)
LYMPHOCYTES NFR BLD: 28.9 %
MCH RBC QN AUTO: 29.3 PG (ref 26–34)
MCHC RBC AUTO-ENTMCNC: 34 G/DL (ref 31–36)
MCV RBC AUTO: 86.2 FL (ref 80–100)
MONOCYTES # BLD: 0.4 K/UL (ref 0–1.3)
MONOCYTES NFR BLD: 7.2 %
NEUTROPHILS # BLD: 3.3 K/UL (ref 1.7–7.7)
NEUTROPHILS NFR BLD: 58.2 %
PLATELET # BLD AUTO: 176 K/UL (ref 135–450)
PMV BLD AUTO: 8 FL (ref 5–10.5)
POTASSIUM SERPL-SCNC: 3.4 MMOL/L (ref 3.5–5.1)
POTASSIUM SERPL-SCNC: 3.6 MMOL/L (ref 3.5–5.1)
POTASSIUM SERPL-SCNC: 3.7 MMOL/L (ref 3.5–5.1)
RBC # BLD AUTO: 3.69 M/UL (ref 4.2–5.9)
SODIUM SERPL-SCNC: 144 MMOL/L (ref 136–145)
SODIUM SERPL-SCNC: 147 MMOL/L (ref 136–145)
SODIUM SERPL-SCNC: 147 MMOL/L (ref 136–145)
WBC # BLD AUTO: 5.7 K/UL (ref 4–11)

## 2025-07-21 PROCEDURE — 99233 SBSQ HOSP IP/OBS HIGH 50: CPT | Performed by: INTERNAL MEDICINE

## 2025-07-21 PROCEDURE — 1200000000 HC SEMI PRIVATE

## 2025-07-21 PROCEDURE — 2580000003 HC RX 258: Performed by: INTERNAL MEDICINE

## 2025-07-21 PROCEDURE — 94760 N-INVAS EAR/PLS OXIMETRY 1: CPT

## 2025-07-21 PROCEDURE — 36415 COLL VENOUS BLD VENIPUNCTURE: CPT

## 2025-07-21 PROCEDURE — 6370000000 HC RX 637 (ALT 250 FOR IP): Performed by: INTERNAL MEDICINE

## 2025-07-21 PROCEDURE — 93010 ELECTROCARDIOGRAM REPORT: CPT | Performed by: STUDENT IN AN ORGANIZED HEALTH CARE EDUCATION/TRAINING PROGRAM

## 2025-07-21 PROCEDURE — 9990000010 HC NO CHARGE VISIT

## 2025-07-21 PROCEDURE — 80048 BASIC METABOLIC PNL TOTAL CA: CPT

## 2025-07-21 PROCEDURE — 6360000002 HC RX W HCPCS: Performed by: INTERNAL MEDICINE

## 2025-07-21 PROCEDURE — 85025 COMPLETE CBC W/AUTO DIFF WBC: CPT

## 2025-07-21 RX ADMIN — DIVALPROEX SODIUM 125 MG: 125 CAPSULE, COATED PELLETS ORAL at 20:58

## 2025-07-21 RX ADMIN — CITALOPRAM HYDROBROMIDE 10 MG: 10 TABLET ORAL at 11:04

## 2025-07-21 RX ADMIN — DEXTROSE: 5 SOLUTION INTRAVENOUS at 16:11

## 2025-07-21 RX ADMIN — CEFTRIAXONE SODIUM 2000 MG: 2 INJECTION, POWDER, FOR SOLUTION INTRAMUSCULAR; INTRAVENOUS at 15:02

## 2025-07-21 RX ADMIN — HALOPERIDOL LACTATE 1 MG: 5 INJECTION, SOLUTION INTRAMUSCULAR at 18:22

## 2025-07-21 RX ADMIN — Medication 19.5 MG: at 20:58

## 2025-07-21 RX ADMIN — DIVALPROEX SODIUM 125 MG: 125 CAPSULE, COATED PELLETS ORAL at 11:04

## 2025-07-21 RX ADMIN — DIVALPROEX SODIUM 125 MG: 125 CAPSULE, COATED PELLETS ORAL at 16:09

## 2025-07-21 RX ADMIN — ENOXAPARIN SODIUM 40 MG: 100 INJECTION SUBCUTANEOUS at 21:06

## 2025-07-21 RX ADMIN — DEXTROSE: 5 SOLUTION INTRAVENOUS at 04:09

## 2025-07-21 ASSESSMENT — PAIN SCALES - GENERAL: PAINLEVEL_OUTOF10: 0

## 2025-07-21 NOTE — CARE COORDINATION
Case Management Assessment  Initial Evaluation    Date/Time of Evaluation: 7/21/2025 4:17 PM  Assessment Completed by: JOSELIN Holloway    If patient is discharged prior to next notation, then this note serves as note for discharge by case management.    Patient Name: Aki Payne                   YOB: 1940  Diagnosis: Complicated UTI (urinary tract infection) [N39.0]  Altered mental status, unspecified altered mental status type [R41.82]                   Date / Time: 7/19/2025 12:08 PM    Patient Admission Status: Inpatient   Readmission Risk (Low < 19, Mod (19-27), High > 27): Readmission Risk Score: 15.3    Current PCP: Zackary Whitman MD  PCP verified by CM? Yes    Chart Reviewed: Yes      History Provided by: Spouse, Medical Record  Patient Orientation: Alert and Oriented, Person    Patient Cognition: Dementia / Early Alzheimer's    Hospitalization in the last 30 days (Readmission):  No    If yes, Readmission Assessment in  Navigator will be completed.    Advance Directives:      Code Status: DNR-CCA   Patient's Primary Decision Maker is: Legal Next of Kin    Primary Decision Maker: Jess Payne - Spouse - [phone]    Secondary Decision Maker: KerryYe - Child - [phone]    Supplemental (Other) Decision Maker: RonaldochaseShaina - Grandchild - [phone]    Discharge Planning:    Patient lives with: Spouse/Significant Other Type of Home: House  Primary Care Giver: Spouse  Patient Support Systems include: Children, Family Members, Spouse/Significant Other   Current Financial resources: Medicare  Current community resources: Other (Comment) (COA)  Current services prior to admission: None            Current DME:              Type of Home Care services:  Aide Services    ADLS  Prior functional level: Assistance with the following:, Housework, Bathing, Cooking, Shopping  Current functional level: Assistance with the following:, Cooking, Housework, Shopping, Mobility,

## 2025-07-21 NOTE — PLAN OF CARE
Problem: Safety - Adult  Goal: Free from fall injury  Outcome: Progressing  Flowsheets (Taken 7/21/2025 0330)  Free From Fall Injury: Instruct family/caregiver on patient safety     Problem: Pain  Goal: Verbalizes/displays adequate comfort level or baseline comfort level  Outcome: Progressing  Flowsheets (Taken 7/21/2025 0330)  Verbalizes/displays adequate comfort level or baseline comfort level:   Encourage patient to monitor pain and request assistance   Implement non-pharmacological measures as appropriate and evaluate response   Administer analgesics based on type and severity of pain and evaluate response   Assess pain using appropriate pain scale     Problem: ABCDS Injury Assessment  Goal: Absence of physical injury  Outcome: Progressing  Flowsheets (Taken 7/21/2025 0330)  Absence of Physical Injury: Implement safety measures based on patient assessment     Problem: Skin/Tissue Integrity  Goal: Skin integrity remains intact  Description: 1.  Monitor for areas of redness and/or skin breakdown  2.  Assess vascular access sites hourly  3.  Every 4-6 hours minimum:  Change oxygen saturation probe site  4.  Every 4-6 hours:  If on nasal continuous positive airway pressure, respiratory therapy assess nares and determine need for appliance change or resting period  Outcome: Progressing  Flowsheets (Taken 7/21/2025 0330)  Skin Integrity Remains Intact: Monitor for areas of redness and/or skin breakdown

## 2025-07-21 NOTE — PROGRESS NOTES
Pt. Restless breathing heavy pulling at ostomy bag and trying to get up out of bed . Pt. Threw cranberry juice all over the bed trying to take diaper off hitting staff . He was incont of stool bed and depends were changed haldol IM was given but pt. Remains restless , so he was placed in bilateral wrist restraints and Dr. Whitman was notified per answering service and Pt.'s wife Uzma Was notified by phone that pt. Is now in bilateral wrist restraints .

## 2025-07-21 NOTE — PROGRESS NOTES
This RN spoke with patient edwin Merritt at bedside regarding patient medications.     Shaina requesting melatonin dose be raised to 20 mg as he takes nightly at home, and is inquiring about possibly adding 12.5 mg Seroquel at night.     MD Whitman messaged via [employer] to address these concerns.    Melatonin dose changed to 19.5 mg. Per MD Whitman we will not be using seroquel at this time d/t recent haldol administration and combined risk for QT prolongation.     Shaina updated.

## 2025-07-21 NOTE — PROGRESS NOTES
Occupational Therapy Attempt  Aki Payne    OT order noted. Noted possible hospice discussion with family. Will await family input before proceeding with therapy.    Electronically signed by Nighat Mccullough OT on 7/21/25 at 3:32 PM EDT

## 2025-07-21 NOTE — PROGRESS NOTES
Comprehensive Nutrition Assessment    Type and Reason for Visit:  Positive nutrition screen, Initial    Nutrition Recommendations/Plan:   Continue low fat/ low chol/high fiber/ADRIANNA diet  Discontinue Ensure Plus ONS   Start Ensure Clear ONS BID (breakfast,dinner)   Encourage and monitor po intake   Monitor weight status      Malnutrition Assessment:  Malnutrition Status:  At risk for malnutrition (07/21/25 0826)    Context:  Chronic Illness     Findings of the 6 clinical characteristics of malnutrition:  Energy Intake:  Mild decrease in energy intake (while inpatient)  Weight Loss:  Greater than 10% over 6 months     Body Fat Loss:  Unable to assess     Muscle Mass Loss:  Unable to assess    Fluid Accumulation:  Mild Extremities (RLE,LLE +1 pitting)   Strength:  Not Performed    Nutrition Assessment:    Triggers for nutritional risk with MST score of 2 for unintended weight loss and decreased appetite. Patient presents to ED with worsening AMS. Admitted for UTI. PMH of hypernatremia, hypokalemia, dehydration, CAD, hypercalemia, hypercholesteremia, and dementia. Patient has poor appetite with <25% intake of most meals. PCA reported patient seems to have a better appetite with increase po intake of meals today. Family reports patient perfers sweeter food/drinks. Discontinue Ensure Plus ONS. Start Ensure Clear ONS BID to help increase po intake. Encourage patient to increase po intake as tolerated. Will continue to monitor.    Nutrition Related Findings:    Labs 7/21: Na 147(H), BUN 26(H). Edema: RLE,LLE +1 pitting. Wound Type: None       Current Nutrition Intake & Therapies:    Average Meal Intake: 1-25%  Average Supplements Intake: 1-25%  ADULT DIET; Regular; Low Fat/Low Chol/High Fiber/ADRIANNA  ADULT ORAL NUTRITION SUPPLEMENT; Breakfast, Lunch, Dinner; Standard High Calorie/High Protein Oral Supplement    Anthropometric Measures:  Height: 175.3 cm (5' 9.02\")  Ideal Body Weight (IBW): 160 lbs (73 kg)    Admission Body

## 2025-07-21 NOTE — DISCHARGE INSTR - COC
Continuity of Care Form    Patient Name: Aki Payne   :  1940  MRN:  [id number]    Admit date:  2025  Discharge date:  25    Code Status Order: DNR-CCA   Advance Directives:     Admitting Physician:  Zackary Whitman MD  PCP: Zackary Whitman MD    Discharging Nurse: Guanako Foster RN  Discharging Hospital Unit/Room#: E1O-3435/3106-01  Discharging Unit Phone Number: [phone]    Emergency Contact:   Extended Emergency Contact Information  Primary Emergency Contact: Jess Payne  Address: [address] [place] [address] [place] [address]  Home Phone: [phone]  Mobile Phone: [phone]  Relation: Spouse  Secondary Emergency Contact: Ye Payne  Home Phone: [phone]  Mobile Phone: [phone]  Relation: Child    Past Surgical History:  Past Surgical History:   Procedure Laterality Date    BLADDER REMOVAL      with ileal conduit    COLONOSCOPY  2010    Due in     COLONOSCOPY  10/05/2016    [facility] [place]    CYSTOSCOPY N/A 10/4/2022    ENDOSCOPIC EXPLORATION OF THE ILEAL CONDUIT WITH BIOPSY AND FULGERATION performed by Joseph Elkins MD at [facility] OR    OTHER SURGICAL HISTORY  2013, 2014    Ileal conduit dilatation ([place])    TIBIA FRACTURE SURGERY Right 1980       Immunization History:   Immunization History   Administered Date(s) Administered    COVID-19, MODERNA BLUE border, Primary or Immunocompromised, (age 12y+), IM, 100 mcg/0.5mL 2021, 2021, 2021    Influenza Vaccine, unspecified formulation 10/27/2015    Influenza Virus Vaccine 10/22/2013, 10/28/2014    Influenza, FLUAD, (age 65 y+), IM, Quadv, 0.5mL 10/31/2022    Influenza, FLUAD, (age 65 y+), IM, Trivalent PF, 0.5mL 10/01/2019    Influenza, FLUZONE High Dose, (age 65 y+), IM, Trivalent PF, 0.5mL 10/24/2016, 2017, 10/26/2018    Pneumococcal, PCV-13, PREVNAR 13, (age 6w+), IM, 0.5mL 10/28/2014, 10/27/2015    Pneumococcal, PPSV23, PNEUMOVAX 23, (age 2y+), SC/IM, 0.5mL 2006

## 2025-07-21 NOTE — ACP (ADVANCE CARE PLANNING)
Advance Care Planning   Healthcare Decision Maker:    Primary Decision Maker: VladlydiaJess - Spouse - [phone]    Secondary Decision Maker: KerryYe - Child - [phone]    Supplemental (Other) Decision Maker: KerryShaina - Grandchild - [phone]    Electronically signed by ALIE Narayanan, LISW, Case Management on 7/21/2025 at 4:09 PM  [place] [phone]

## 2025-07-21 NOTE — PROGRESS NOTES
[facility]      Chief Complaint: Patient eating with assistance, non-verbal currently    Subjective/Interval History:    This morning the patient is sitting up in bed.  The PCAs at the bedside feeding him and he is eating well.  He is nonverbal but is alert and attentive to eating.  No new problems noted overnight.    Patient admitted over the weekend with hypernatremia and hypercalcemia along with dehydration.  The renal function is improving but the patient remains slightly hypernatremic despite D5W.  He continues with IV fluids infusing but he is more alert today.    Urine culture from the outpatient office on  growing [place] without final culture results yet.    Review of systems unobtainable.    PMH, PSH, FH/SH reviewed and unchanged as documented in the H&P dated 25    Medication list reviewed    Objective:    /67   Pulse 52   Temp 97.3 °F (36.3 °C) (Oral)   Resp 16   Ht 1.753 m (5' 9.02\")   Wt 74.5 kg (164 lb 3.9 oz)   SpO2 98%   BMI 24.24 kg/m²   Temp  Av.8 °F (36.6 °C)  Min: 97.3 °F (36.3 °C)  Max: 98.4 °F (36.9 °C)    RRR  Chest-respirations easy.  Chest is clear to auscultation.  Abd- Soft, NTND   Ext- no edema    The Following Labs Were Reviewed Today:     Recent Results (from the past 24 hours)   Basic Metabolic Panel    Collection Time: 25 12:51 PM   Result Value Ref Range    Sodium 150 (H) 136 - 145 mmol/L    Potassium 4.0 3.5 - 5.1 mmol/L    Chloride 121 (H) 99 - 110 mmol/L    CO2 22 21 - 32 mmol/L    Anion Gap 7 3 - 16    Glucose 92 70 - 99 mg/dL    BUN 32 (H) 7 - 20 mg/dL    Creatinine 0.9 0.8 - 1.3 mg/dL    Est, Glom Filt Rate 84 >60    Calcium 10.1 8.3 - 10.6 mg/dL   Basic Metabolic Panel    Collection Time: 25  6:52 PM   Result Value Ref Range    Sodium 150 (H) 136 - 145 mmol/L    Potassium 4.0 3.5 - 5.1 mmol/L    Chloride 119 (H) 99 - 110 mmol/L    CO2 22 21 - 32 mmol/L    Anion Gap 9 3 - 16    Glucose 102 (H) 70 - 99 mg/dL    BUN

## 2025-07-22 LAB
ANION GAP SERPL CALCULATED.3IONS-SCNC: 11 MMOL/L (ref 3–16)
ANION GAP SERPL CALCULATED.3IONS-SCNC: 13 MMOL/L (ref 3–16)
BASOPHILS # BLD: 0.1 K/UL (ref 0–0.2)
BASOPHILS NFR BLD: 0.8 %
BUN SERPL-MCNC: 22 MG/DL (ref 7–20)
BUN SERPL-MCNC: 22 MG/DL (ref 7–20)
CALCIUM SERPL-MCNC: 10.1 MG/DL (ref 8.3–10.6)
CALCIUM SERPL-MCNC: 10.3 MG/DL (ref 8.3–10.6)
CHLORIDE SERPL-SCNC: 104 MMOL/L (ref 99–110)
CHLORIDE SERPL-SCNC: 108 MMOL/L (ref 99–110)
CO2 SERPL-SCNC: 22 MMOL/L (ref 21–32)
CO2 SERPL-SCNC: 24 MMOL/L (ref 21–32)
CREAT SERPL-MCNC: 0.8 MG/DL (ref 0.8–1.3)
CREAT SERPL-MCNC: 0.9 MG/DL (ref 0.8–1.3)
DEPRECATED RDW RBC AUTO: 14.2 % (ref 12.4–15.4)
EOSINOPHIL # BLD: 0.2 K/UL (ref 0–0.6)
EOSINOPHIL NFR BLD: 3.4 %
GFR SERPLBLD CREATININE-BSD FMLA CKD-EPI: 84 ML/MIN/{1.73_M2}
GFR SERPLBLD CREATININE-BSD FMLA CKD-EPI: 87 ML/MIN/{1.73_M2}
GLUCOSE BLD-MCNC: 150 MG/DL (ref 70–99)
GLUCOSE SERPL-MCNC: 106 MG/DL (ref 70–99)
GLUCOSE SERPL-MCNC: 146 MG/DL (ref 70–99)
HCT VFR BLD AUTO: 32.2 % (ref 40.5–52.5)
HGB BLD-MCNC: 11.1 G/DL (ref 13.5–17.5)
LYMPHOCYTES # BLD: 1.7 K/UL (ref 1–5.1)
LYMPHOCYTES NFR BLD: 27.8 %
MCH RBC QN AUTO: 29.6 PG (ref 26–34)
MCHC RBC AUTO-ENTMCNC: 34.4 G/DL (ref 31–36)
MCV RBC AUTO: 86 FL (ref 80–100)
MONOCYTES # BLD: 0.5 K/UL (ref 0–1.3)
MONOCYTES NFR BLD: 7.7 %
NEUTROPHILS # BLD: 3.6 K/UL (ref 1.7–7.7)
NEUTROPHILS NFR BLD: 60.3 %
PERFORMED ON: ABNORMAL
PLATELET # BLD AUTO: 189 K/UL (ref 135–450)
PMV BLD AUTO: 8.2 FL (ref 5–10.5)
POTASSIUM SERPL-SCNC: 3.2 MMOL/L (ref 3.5–5.1)
POTASSIUM SERPL-SCNC: 3.7 MMOL/L (ref 3.5–5.1)
RBC # BLD AUTO: 3.74 M/UL (ref 4.2–5.9)
SODIUM SERPL-SCNC: 139 MMOL/L (ref 136–145)
SODIUM SERPL-SCNC: 143 MMOL/L (ref 136–145)
VALPROATE FREE MFR SERPL: ABNORMAL % (ref 5–18)
VALPROATE FREE SERPL-MCNC: <7 UG/ML (ref 7–23)
VALPROATE SERPL-MCNC: 10 UG/ML (ref 50–125)
WBC # BLD AUTO: 5.9 K/UL (ref 4–11)

## 2025-07-22 PROCEDURE — 2580000003 HC RX 258: Performed by: INTERNAL MEDICINE

## 2025-07-22 PROCEDURE — 94761 N-INVAS EAR/PLS OXIMETRY MLT: CPT

## 2025-07-22 PROCEDURE — 6370000000 HC RX 637 (ALT 250 FOR IP): Performed by: INTERNAL MEDICINE

## 2025-07-22 PROCEDURE — 97530 THERAPEUTIC ACTIVITIES: CPT

## 2025-07-22 PROCEDURE — 1200000000 HC SEMI PRIVATE

## 2025-07-22 PROCEDURE — 6360000002 HC RX W HCPCS: Performed by: INTERNAL MEDICINE

## 2025-07-22 PROCEDURE — 36415 COLL VENOUS BLD VENIPUNCTURE: CPT

## 2025-07-22 PROCEDURE — 97166 OT EVAL MOD COMPLEX 45 MIN: CPT

## 2025-07-22 PROCEDURE — 2500000003 HC RX 250 WO HCPCS: Performed by: INTERNAL MEDICINE

## 2025-07-22 PROCEDURE — 85025 COMPLETE CBC W/AUTO DIFF WBC: CPT

## 2025-07-22 PROCEDURE — 97162 PT EVAL MOD COMPLEX 30 MIN: CPT

## 2025-07-22 PROCEDURE — 80048 BASIC METABOLIC PNL TOTAL CA: CPT

## 2025-07-22 PROCEDURE — 99232 SBSQ HOSP IP/OBS MODERATE 35: CPT | Performed by: INTERNAL MEDICINE

## 2025-07-22 RX ORDER — DEXTROSE MONOHYDRATE, SODIUM CHLORIDE, AND POTASSIUM CHLORIDE 50; 1.49; 4.5 G/1000ML; G/1000ML; G/1000ML
INJECTION, SOLUTION INTRAVENOUS CONTINUOUS
Status: DISCONTINUED | OUTPATIENT
Start: 2025-07-22 | End: 2025-07-23

## 2025-07-22 RX ADMIN — ENOXAPARIN SODIUM 40 MG: 100 INJECTION SUBCUTANEOUS at 23:05

## 2025-07-22 RX ADMIN — DEXTROSE, SODIUM CHLORIDE, AND POTASSIUM CHLORIDE: 5; .45; .15 INJECTION INTRAVENOUS at 14:26

## 2025-07-22 RX ADMIN — DEXTROSE: 5 SOLUTION INTRAVENOUS at 06:51

## 2025-07-22 RX ADMIN — HALOPERIDOL LACTATE 1 MG: 5 INJECTION, SOLUTION INTRAMUSCULAR at 23:03

## 2025-07-22 RX ADMIN — DIVALPROEX SODIUM 125 MG: 125 CAPSULE, COATED PELLETS ORAL at 09:41

## 2025-07-22 RX ADMIN — CITALOPRAM HYDROBROMIDE 10 MG: 10 TABLET ORAL at 09:41

## 2025-07-22 RX ADMIN — CEFTRIAXONE SODIUM 2000 MG: 2 INJECTION, POWDER, FOR SOLUTION INTRAMUSCULAR; INTRAVENOUS at 16:36

## 2025-07-22 RX ADMIN — DIVALPROEX SODIUM 125 MG: 125 CAPSULE, COATED PELLETS ORAL at 16:36

## 2025-07-22 RX ADMIN — POTASSIUM BICARBONATE 40 MEQ: 391 TABLET, EFFERVESCENT ORAL at 09:41

## 2025-07-22 ASSESSMENT — PAIN SCALES - PAIN ASSESSMENT IN ADVANCED DEMENTIA (PAINAD)
TOTALSCORE: 0
CONSOLABILITY: NO NEED TO CONSOLE
BREATHING: NORMAL
BODYLANGUAGE: RELAXED
FACIALEXPRESSION: SMILING OR INEXPRESSIVE

## 2025-07-22 ASSESSMENT — PAIN SCALES - WONG BAKER: WONGBAKER_NUMERICALRESPONSE: NO HURT

## 2025-07-22 NOTE — PROGRESS NOTES
[facility] - Physical Therapy   Phone: [phone]    Physical Therapy  Facility/Department:[address] ORTHOPEDICS    [x] Initial Evaluation            [] Daily Treatment Note         [] Discharge Summary      Patient: Aki Payne   : 1940   MRN: [id number]   Date of Service:  2025  Staff Mobility Recommendation: stedy x 2 ??     AM-PAC score:   Discharge Recommendations: SNF     Aki Payne scored a  on the AM-PAC short mobility form. Current research shows that an AM-PAC score of 17 or less is typically not associated with a discharge to the patient's home setting. Based on the patient's AM-PAC score and their current functional mobility deficits, it is recommended that the patient have up to 5 sessions per week of Physical Therapy at d/c to increase the patient's independence.  Please see assessment section for further patient specific details.    If patient discharges prior to next session this note will serve as a discharge summary.  Please see below for the latest assessment towards goals.       Admitting Diagnosis: Complicated UTI (urinary tract infection)  Ordering Physician: J Carlos   Current Admission Summary: here due to AMS, UTI recently     Past Medical History:  has a past medical history of Bladder cancer (HCC), Blood in urine, CAD (coronary artery disease), Colon polyps, Dementia (HCC), DNR (do not resuscitate), and Hyperlipidemia.  Past Surgical History:  has a past surgical history that includes Bladder removal (); Colonoscopy (2010); other surgical history (2013, 2014); Colonoscopy (10/05/2016); Tibia fracture surgery (Right, ); and Cystoscopy (N/A, 10/4/2022).    Assessment  Activity Tolerance: Fair  Impairments Requiring Therapeutic Intervention: decreased functional mobility, decreased ADL status, decreased safety awareness, decreased cognition, decreased endurance, decreased balance  Prognosis: fair    Clinical Assessment:   -very sleepy    -not oriented   -able to mobilize to sitting at the EOB - sits midline at close cga   -little engagement otherwise   -anticipate as mentation clears will be able to participate for skilled PTOT and nursing care       DME Required For Discharge: DME to be determined at next level of care        Restrictions:  Precautions/Restrictions: restraints  Weight Bearing Restrictions: no restrictions    Required Braces/Orthotics: no braces required  Positional Restrictions:no positional restrictions    Subjective  General: to room to patient sleeping in bed -opens eyes to tactile and verbal cues   Family/Caregiver present: No  Pain: Patient unable to rate secondary to cognition/communication deficits  Pain Interventions: not applicable    Home Environment / Functional History  Additional Comments: per : Spouse reported patient is below baseline: he usually walks all over the house by himself, walks up/down steps to bedroom and bathroom    Available Assistance at Discharge: 24 hr supervision (non-physical) available    Examination   Vision/Hearing  Vision  Vision:  (unable to assess accurately)  Hearing  Hearing: NANETTE (comment) (not able to assess accurately)    Observation:   General Observation:  -  Posture:   Good  Sensation:   Not Tested  Coordination Testing:   Not Tested    ROM:   Not assessed formally at this session   Strength:   Formal MMT held secondary to sleepiness and AMS  Therapist Clinical Decision Making (Complexity): medium complexity  Clinical Presentation: evolving      Functional Mobility  Bed Mobility:  Supine to Sit: maximum assist of 2 person  Sit to Supine: maximum assist of 2 person    Transfers:  No transfers completed on this date secondary to AMS.    Ambulation:  Ambulation not tested on this date secondary to AMS.  Distance: -  Gait Mechanics: -  Comments: -  Balance:  Able to sit midline at the EOB once both feet situated to floor and with cga     Exercise:   No exercises performed

## 2025-07-22 NOTE — PLAN OF CARE
Problem: Discharge Planning  Goal: Discharge to home or other facility with appropriate resources  7/22/2025 1212 by Pamela William RN  Outcome: Progressing  7/22/2025 0227 by Za Cruz RN  Outcome: Progressing  Flowsheets (Taken 7/21/2025 0830 by Liliya Hawkins, RN)  Discharge to home or other facility with appropriate resources:   Identify barriers to discharge with patient and caregiver   Arrange for needed discharge resources and transportation as appropriate   Identify discharge learning needs (meds, wound care, etc)   Arrange for interpreters to assist at discharge as needed   Refer to discharge planning if patient needs post-hospital services based on physician order or complex needs related to functional status, cognitive ability or social support system     Problem: Safety - Adult  Goal: Free from fall injury  7/22/2025 1212 by Pamela William RN  Outcome: Progressing  7/22/2025 0227 by Za Cruz RN  Outcome: Progressing  Flowsheets (Taken 7/21/2025 0330 by Hiwot Nuno, RN)  Free From Fall Injury: Instruct family/caregiver on patient safety     Problem: Pain  Goal: Verbalizes/displays adequate comfort level or baseline comfort level  7/22/2025 1212 by Pamela William RN  Outcome: Progressing  7/22/2025 0227 by Za Cruz RN  Outcome: Progressing  Flowsheets (Taken 7/21/2025 0330 by Hiwot Nuno, RN)  Verbalizes/displays adequate comfort level or baseline comfort level:   Encourage patient to monitor pain and request assistance   Implement non-pharmacological measures as appropriate and evaluate response   Administer analgesics based on type and severity of pain and evaluate response   Assess pain using appropriate pain scale     Problem: ABCDS Injury Assessment  Goal: Absence of physical injury  7/22/2025 1212 by Pamela William RN  Outcome: Progressing  7/22/2025 0227 by Za Cruz RN  Outcome: Progressing  Flowsheets (Taken 7/21/2025 0330 by Hiwot Nuno RN)  Absence of  been tried or would not be effective before applying the restraint   Evaluate the patient's condition at the time of restraint application   Inform patient/family regarding the reason for restraint   Every 2 hours: Monitor safety, psychosocial status, comfort, nutrition and hydration

## 2025-07-22 NOTE — PLAN OF CARE
Problem: Discharge Planning  Goal: Discharge to home or other facility with appropriate resources  Outcome: Progressing  Flowsheets (Taken 7/21/2025 0830 by Liliya Hawkins, RN)  Discharge to home or other facility with appropriate resources:   Identify barriers to discharge with patient and caregiver   Arrange for needed discharge resources and transportation as appropriate   Identify discharge learning needs (meds, wound care, etc)   Arrange for interpreters to assist at discharge as needed   Refer to discharge planning if patient needs post-hospital services based on physician order or complex needs related to functional status, cognitive ability or social support system     Problem: Safety - Adult  Goal: Free from fall injury  Outcome: Progressing  Flowsheets (Taken 7/21/2025 0330 by Hiwot Nuno, RN)  Free From Fall Injury: Instruct family/caregiver on patient safety     Problem: Pain  Goal: Verbalizes/displays adequate comfort level or baseline comfort level  Outcome: Progressing  Flowsheets (Taken 7/21/2025 0330 by Hiwot Nuno, RN)  Verbalizes/displays adequate comfort level or baseline comfort level:   Encourage patient to monitor pain and request assistance   Implement non-pharmacological measures as appropriate and evaluate response   Administer analgesics based on type and severity of pain and evaluate response   Assess pain using appropriate pain scale     Problem: ABCDS Injury Assessment  Goal: Absence of physical injury  Outcome: Progressing  Flowsheets (Taken 7/21/2025 0330 by Hiwot Nuno, RN)  Absence of Physical Injury: Implement safety measures based on patient assessment     Problem: Skin/Tissue Integrity  Goal: Skin integrity remains intact  Description: 1.  Monitor for areas of redness and/or skin breakdown  2.  Assess vascular access sites hourly  3.  Every 4-6 hours minimum:  Change oxygen saturation probe site  4.  Every 4-6 hours:  If on nasal continuous positive

## 2025-07-22 NOTE — CARE COORDINATION
Continue to follow patient for discharge planning. Per bedside RN, patient still has sitter at bedside. Left message for Bri at [facility].    Electronically signed by ALIE Narayanan, JOSELIN, Case Management on 7/22/2025 at 4:00 PM  Center [phone]    4:04 PM  Received call back from Bri--they will not be able to accept patient due to patient care needs and behaviors.   Informed patient's spouse regarding above and requested additional snf choices. She reported she would discuss with her daughter and let Sw know. Offered to call her daughter--spouse declined.    Electronically signed by ALIE Narayanan, JOSELIN, Case Management on 7/22/2025 at 4:10 PM  [employer] [phone]

## 2025-07-22 NOTE — PROGRESS NOTES
[facility] - Occupational Therapy   Phone: [phone]    Occupational Therapy  Facility/Department:[address] ORTHOPEDICS    [x] Initial Evaluation            [] Daily Treatment Note         [] Discharge Summary      Patient: Aki Payne   : 1940   MRN: [id number]   Date of Service:  2025    Staff Mobility Recommendation: Stedy x2 ??    AM-PAC Score:   Discharge Recommendations: SNF    Admitting Diagnosis:  Complicated UTI (urinary tract infection)  Ordering Physician: Lionel  Current Admission Summary: Per H&P: Aki Payne is a 85 y.o. male with a history of bladder cx, CAD, dementia, and HLD who presents to the hospital for abnormal labs and somnolence. Pt has had progressive dementia over the last year. Getting severe. Pt having issues with agitation at times and not sleeping at night. Pt has been following w/PCP for this. Recently, about 2 weeks ago, his meds were adjusted. Pt zyprexa was changed to seroquel. He has been on the same doses of depakote and celexa for some time. Ever since this change pt has been more somnolent.     Past Medical History:  has a past medical history of Bladder cancer (HCC), Blood in urine, CAD (coronary artery disease), Colon polyps, Dementia (HCC), DNR (do not resuscitate), and Hyperlipidemia.  Past Surgical History:  has a past surgical history that includes Bladder removal (); Colonoscopy (2010); other surgical history (2013, 2014); Colonoscopy (10/05/2016); Tibia fracture surgery (Right, ); and Cystoscopy (N/A, 10/4/2022).    Assessment  Activity Tolerance: Fair  Impairments Requiring Therapeutic Intervention: decreased functional mobility, decreased ADL status, decreased ROM, decreased strength, decreased safety awareness, decreased cognition, decreased endurance, decreased balance  Prognosis: guarded    Clinical Assessment: At baseline, pt lives at home with his wife where he requires assist for most ADLs but is able to complete  IADL completed on this date.    Functional Mobility  Bed Mobility:  Supine to Sit: maximum assist of 2 person  Sit to Supine: maximum assist of 2 person  Scooting: maximum assist of 2 person  Transfers:  No transfers completed on this date secondary to cognition.  Functional Mobility  No functional mobility completed on this date secondary to cognition/safety concerns.  Balance:  Static Sitting Balance: fair: maintains balance at CGA without use of UE support    Cognition  Comments: H/o dementia; minimal verbal responses. Poor command following  Orientation:    Unable to state name     Education  Barriers To Learning: cognition  Patient Education: patient educated on OT role and benefits, orientation  Learning Assessment:  patient will require reinforcement due to cognitive deficits, patient unable to verbalize understanding  Safety Interventions: call light within reach, patient left in bed, bed alarm in place, sitter present, and physical restraints in use    Plan  Frequency: 2-3 x/week  Current Treatment Recommendations: strengthening, balance training, functional mobility training, transfer training, endurance training, patient/caregiver education, ADL/self-care training, cognitive reorientation, and safety education    Goals  Patient Goals: pt unable to state d/t cog  Short Term Goals:  Time Frame: By acute discharge  Patient will complete self-feeding with minimal assistance  Patient will complete grooming with moderate assistance  Patient will complete functional transfers with maximum assistance  Patient will complete bed mobility with moderate assistance    Above goals reviewed on 7/22/2025.  All goals are ongoing at this time unless indicated above.       Therapy Session Time     Individual Group Co-treatment   Time In 1050      Time Out 1113      Minutes 23           Timed Code Treatment Minutes: 8 Minutes  Total Treatment Minutes:  23 minutes       Minutes per charge:  Moderate complexity eval: 15

## 2025-07-22 NOTE — PROGRESS NOTES
[facility] Note      Chief Complaint: Patient alert and awake, non-verbal    Subjective/Interval History:    This morning the patient is resting quietly.    He was agitated last night and needed haldol and wrist restraints.  No other new problems noted today.    Review of systems unobtainable.    PMH, PSH, FH/SH reviewed and unchanged as documented in the H&P dated 25    Medication list reviewed    Objective:    BP (!) 147/74   Pulse 54   Temp 97.5 °F (36.4 °C) (Oral)   Resp 17   Ht 1.753 m (5' 9.02\")   Wt 74.2 kg (163 lb 9.3 oz)   SpO2 98%   BMI 24.15 kg/m²   Temp  Av.6 °F (36.4 °C)  Min: 97.3 °F (36.3 °C)  Max: 97.9 °F (36.6 °C)    RRR  Chest-respirations easy.  Chest is clear to auscultation.  Abd- Soft, NTND   Ext- no edema    The Following Labs Were Reviewed Today:     Recent Results (from the past 24 hours)   Basic Metabolic Panel    Collection Time: 25  7:41 PM   Result Value Ref Range    Sodium 144 136 - 145 mmol/L    Potassium 3.4 (L) 3.5 - 5.1 mmol/L    Chloride 109 99 - 110 mmol/L    CO2 19 (L) 21 - 32 mmol/L    Anion Gap 16 3 - 16    Glucose 93 70 - 99 mg/dL    BUN 22 (H) 7 - 20 mg/dL    Creatinine 0.9 0.8 - 1.3 mg/dL    Est, Glom Filt Rate 84 >60    Calcium 10.6 8.3 - 10.6 mg/dL   CBC with Auto Differential    Collection Time: 25  4:23 AM   Result Value Ref Range    WBC 5.9 4.0 - 11.0 K/uL    RBC 3.74 (L) 4.20 - 5.90 M/uL    Hemoglobin 11.1 (L) 13.5 - 17.5 g/dL    Hematocrit 32.2 (L) 40.5 - 52.5 %    MCV 86.0 80.0 - 100.0 fL    MCH 29.6 26.0 - 34.0 pg    MCHC 34.4 31.0 - 36.0 g/dL    RDW 14.2 12.4 - 15.4 %    Platelets 189 135 - 450 K/uL    MPV 8.2 5.0 - 10.5 fL    Neutrophils % 60.3 %    Lymphocytes % 27.8 %    Monocytes % 7.7 %    Eosinophils % 3.4 %    Basophils % 0.8 %    Neutrophils Absolute 3.6 1.7 - 7.7 K/uL    Lymphocytes Absolute 1.7 1.0 - 5.1 K/uL    Monocytes Absolute 0.5 0.0 - 1.3 K/uL    Eosinophils Absolute 0.2 0.0 - 0.6 K/uL    Basophils

## 2025-07-23 LAB
ANION GAP SERPL CALCULATED.3IONS-SCNC: 12 MMOL/L (ref 3–16)
ANION GAP SERPL CALCULATED.3IONS-SCNC: 9 MMOL/L (ref 3–16)
BASOPHILS # BLD: 0.1 K/UL (ref 0–0.2)
BASOPHILS NFR BLD: 0.7 %
BUN SERPL-MCNC: 19 MG/DL (ref 7–20)
BUN SERPL-MCNC: 20 MG/DL (ref 7–20)
CALCIUM SERPL-MCNC: 10.1 MG/DL (ref 8.3–10.6)
CALCIUM SERPL-MCNC: 10.3 MG/DL (ref 8.3–10.6)
CHLORIDE SERPL-SCNC: 105 MMOL/L (ref 99–110)
CHLORIDE SERPL-SCNC: 107 MMOL/L (ref 99–110)
CO2 SERPL-SCNC: 22 MMOL/L (ref 21–32)
CO2 SERPL-SCNC: 23 MMOL/L (ref 21–32)
CREAT SERPL-MCNC: 0.8 MG/DL (ref 0.8–1.3)
CREAT SERPL-MCNC: 0.8 MG/DL (ref 0.8–1.3)
DEPRECATED RDW RBC AUTO: 14.1 % (ref 12.4–15.4)
EOSINOPHIL # BLD: 0.2 K/UL (ref 0–0.6)
EOSINOPHIL NFR BLD: 2 %
GFR SERPLBLD CREATININE-BSD FMLA CKD-EPI: 87 ML/MIN/{1.73_M2}
GFR SERPLBLD CREATININE-BSD FMLA CKD-EPI: 87 ML/MIN/{1.73_M2}
GLUCOSE SERPL-MCNC: 104 MG/DL (ref 70–99)
GLUCOSE SERPL-MCNC: 109 MG/DL (ref 70–99)
HCT VFR BLD AUTO: 33.7 % (ref 40.5–52.5)
HGB BLD-MCNC: 11.7 G/DL (ref 13.5–17.5)
LYMPHOCYTES # BLD: 1.8 K/UL (ref 1–5.1)
LYMPHOCYTES NFR BLD: 23.1 %
MCH RBC QN AUTO: 29.5 PG (ref 26–34)
MCHC RBC AUTO-ENTMCNC: 34.9 G/DL (ref 31–36)
MCV RBC AUTO: 84.6 FL (ref 80–100)
MONOCYTES # BLD: 0.5 K/UL (ref 0–1.3)
MONOCYTES NFR BLD: 6.9 %
NEUTROPHILS # BLD: 5.2 K/UL (ref 1.7–7.7)
NEUTROPHILS NFR BLD: 67.3 %
PLATELET # BLD AUTO: 213 K/UL (ref 135–450)
PMV BLD AUTO: 8.6 FL (ref 5–10.5)
POTASSIUM SERPL-SCNC: 3.4 MMOL/L (ref 3.5–5.1)
POTASSIUM SERPL-SCNC: 4.3 MMOL/L (ref 3.5–5.1)
RBC # BLD AUTO: 3.98 M/UL (ref 4.2–5.9)
SODIUM SERPL-SCNC: 137 MMOL/L (ref 136–145)
SODIUM SERPL-SCNC: 141 MMOL/L (ref 136–145)
WBC # BLD AUTO: 7.7 K/UL (ref 4–11)

## 2025-07-23 PROCEDURE — 80048 BASIC METABOLIC PNL TOTAL CA: CPT

## 2025-07-23 PROCEDURE — 1200000000 HC SEMI PRIVATE

## 2025-07-23 PROCEDURE — 97530 THERAPEUTIC ACTIVITIES: CPT

## 2025-07-23 PROCEDURE — 6370000000 HC RX 637 (ALT 250 FOR IP): Performed by: INTERNAL MEDICINE

## 2025-07-23 PROCEDURE — 94760 N-INVAS EAR/PLS OXIMETRY 1: CPT

## 2025-07-23 PROCEDURE — 36415 COLL VENOUS BLD VENIPUNCTURE: CPT

## 2025-07-23 PROCEDURE — 99232 SBSQ HOSP IP/OBS MODERATE 35: CPT | Performed by: INTERNAL MEDICINE

## 2025-07-23 PROCEDURE — 6360000002 HC RX W HCPCS: Performed by: INTERNAL MEDICINE

## 2025-07-23 PROCEDURE — 2500000003 HC RX 250 WO HCPCS: Performed by: INTERNAL MEDICINE

## 2025-07-23 PROCEDURE — 85025 COMPLETE CBC W/AUTO DIFF WBC: CPT

## 2025-07-23 PROCEDURE — 9990000010 HC NO CHARGE VISIT

## 2025-07-23 PROCEDURE — 2580000003 HC RX 258: Performed by: INTERNAL MEDICINE

## 2025-07-23 RX ORDER — QUETIAPINE FUMARATE 25 MG/1
12.5 TABLET, FILM COATED ORAL NIGHTLY
Status: DISCONTINUED | OUTPATIENT
Start: 2025-07-23 | End: 2025-07-25

## 2025-07-23 RX ADMIN — DIVALPROEX SODIUM 125 MG: 125 CAPSULE, COATED PELLETS ORAL at 14:13

## 2025-07-23 RX ADMIN — QUETIAPINE FUMARATE 12.5 MG: 25 TABLET ORAL at 21:09

## 2025-07-23 RX ADMIN — SODIUM CHLORIDE, PRESERVATIVE FREE 10 ML: 5 INJECTION INTRAVENOUS at 21:10

## 2025-07-23 RX ADMIN — POTASSIUM BICARBONATE 40 MEQ: 391 TABLET, EFFERVESCENT ORAL at 12:51

## 2025-07-23 RX ADMIN — CITALOPRAM HYDROBROMIDE 10 MG: 10 TABLET ORAL at 12:49

## 2025-07-23 RX ADMIN — DIVALPROEX SODIUM 125 MG: 125 CAPSULE, COATED PELLETS ORAL at 21:09

## 2025-07-23 RX ADMIN — ENOXAPARIN SODIUM 40 MG: 100 INJECTION SUBCUTANEOUS at 21:09

## 2025-07-23 RX ADMIN — CEFTRIAXONE SODIUM 2000 MG: 2 INJECTION, POWDER, FOR SOLUTION INTRAMUSCULAR; INTRAVENOUS at 13:37

## 2025-07-23 RX ADMIN — SODIUM CHLORIDE, PRESERVATIVE FREE 5 ML: 5 INJECTION INTRAVENOUS at 12:52

## 2025-07-23 RX ADMIN — Medication 19.5 MG: at 21:09

## 2025-07-23 ASSESSMENT — PAIN SCALES - PAIN ASSESSMENT IN ADVANCED DEMENTIA (PAINAD)
FACIALEXPRESSION: SMILING OR INEXPRESSIVE
TOTALSCORE: 0
BREATHING: NORMAL
CONSOLABILITY: NO NEED TO CONSOLE
CONSOLABILITY: NO NEED TO CONSOLE
FACIALEXPRESSION: SMILING OR INEXPRESSIVE
BODYLANGUAGE: RELAXED
BREATHING: NORMAL
BODYLANGUAGE: RELAXED
TOTALSCORE: 0

## 2025-07-23 ASSESSMENT — PAIN SCALES - GENERAL: PAINLEVEL_OUTOF10: 0

## 2025-07-23 NOTE — PROGRESS NOTES
[facility] - Physical Therapy   Phone: [phone]    Physical Therapy  Facility/Department:[address] ORTHOPEDICS    [] Initial Evaluation            [x] Daily Treatment Note         [] Discharge Summary      Patient: Aki Payne   : 1940   MRN: [id number]   Date of Service:  2025  Staff Mobility Recommendation: stedy x 1    AM-PAC score:   Discharge Recommendations: SNF     Aki Payne scored a  on the AM-PAC short mobility form. Current research shows that an AM-PAC score of 17 or less is typically not associated with a discharge to the patient's home setting. Based on the patient's AM-PAC score and their current functional mobility deficits, it is recommended that the patient have up to 5 sessions per week of Physical Therapy at d/c to increase the patient's independence.  Please see assessment section for further patient specific details.    If patient discharges prior to next session this note will serve as a discharge summary.  Please see below for the latest assessment towards goals.       Admitting Diagnosis: Complicated UTI (urinary tract infection)  Ordering Physician: J Carlos   Current Admission Summary: here due to AMS, UTI recently     Past Medical History:  has a past medical history of Bladder cancer (HCC), Blood in urine, CAD (coronary artery disease), Colon polyps, Dementia (HCC), DNR (do not resuscitate), and Hyperlipidemia.  Past Surgical History:  has a past surgical history that includes Bladder removal (); Colonoscopy (2010); other surgical history (2013, 2014); Colonoscopy (10/05/2016); Tibia fracture surgery (Right, ); and Cystoscopy (N/A, 10/4/2022).    Assessment  Activity Tolerance: Fair  Impairments Requiring Therapeutic Intervention: decreased functional mobility, decreased ADL status, decreased safety awareness, decreased cognition, decreased endurance, decreased balance  Prognosis: fair    Clinical Assessment:   -status 25    -able to engage for PT OT session today   -he is awake and eating potato chips  -not oriented but follows some cues with increased time and repetition   -able to be assisted up to standing to ambulate in room but is needing Mod assist of 2 with hand in hand assist   -anticipate as mentation clears further (baseline dementia but was ambulating independently prior to this admission)         DME Required For Discharge: DME to be determined at next level of care        Restrictions:  Precautions/Restrictions: IV  Weight Bearing Restrictions: no restrictions    Required Braces/Orthotics: no braces required  Positional Restrictions:no positional restrictions    Subjective  General: to room to patient awake - family in room visiting  Family/Caregiver present: spouse  Pain: Patient unable to rate secondary to cognition/communication deficits  Pain Interventions: not applicable    Home Environment / Functional History  Additional Comments: per : Spouse reported patient is below baseline: he usually walks all over the house by himself, walks up/down steps to bedroom and bathroom    Available Assistance at Discharge: 24 hr supervision (non-physical) available    Examination   Vision/Hearing       Observation:   General Observation:  -  Posture:   Good  Sensation:   Not Tested  Coordination Testing:   Not Tested    ROM:   Not assessed formally at this session   Strength:   Grossly functional as observed mobilizing from bed but not able to assess formally   Therapist Clinical Decision Making (Complexity): medium complexity  Clinical Presentation: evolving      Functional Mobility  Bed Mobility:  Supine to Sit: moderate assistance of 2 person  Sit to Supine: n/a, in recliner at end of session    Transfers:  Sit to stand transfer: moderate assistance of 2 person  Stand to sit transfer: moderate assistance of 2 person    Ambulation:  Surface:level surface  Assistance: moderate assistance of 2 person  Distance: -in

## 2025-07-23 NOTE — PROGRESS NOTES
Patient laying in bed asleep with PCA sitter at bedside.  Patient looks content and is soundly sleeping.  Will attempt to give patient medications when awake and calm. Electronically signed by Johanna Gipson RN on 7/23/2025 at 10:49 AM

## 2025-07-23 NOTE — CARE COORDINATION
Met with wife & granddaughter to discuss the fact that no facility will commit to admit until sitter free for 24-48 hrs. Also sent referral to [facility] per their request.   Will follow.  Electronically signed by TITI Chapa on 7/23/2025 at 5:25 PM

## 2025-07-23 NOTE — PROGRESS NOTES
[facility] - Occupational Therapy   Phone: [phone]    Occupational Therapy  Facility/Department:[address] ORTHOPEDICS    [] Initial Evaluation            [x] Daily Treatment Note         [] Discharge Summary      Patient: Aki Payne   : 1940   MRN: [id number]   Date of Service:  2025    Staff Mobility Recommendation: Stedy x2    AM-PAC Score: 10/24  Discharge Recommendations: SNF    Admitting Diagnosis:  Complicated UTI (urinary tract infection)  Ordering Physician: Lionel  Current Admission Summary: Per H&P: Aki Payne is a 85 y.o. male with a history of bladder cx, CAD, dementia, and HLD who presents to the hospital for abnormal labs and somnolence. Pt has had progressive dementia over the last year. Getting severe. Pt having issues with agitation at times and not sleeping at night. Pt has been following w/PCP for this. Recently, about 2 weeks ago, his meds were adjusted. Pt zyprexa was changed to seroquel. He has been on the same doses of depakote and celexa for some time. Ever since this change pt has been more somnolent.     Past Medical History:  has a past medical history of Bladder cancer (HCC), Blood in urine, CAD (coronary artery disease), Colon polyps, Dementia (HCC), DNR (do not resuscitate), and Hyperlipidemia.  Past Surgical History:  has a past surgical history that includes Bladder removal (); Colonoscopy (2010); other surgical history (2013, 2014); Colonoscopy (10/05/2016); Tibia fracture surgery (Right, ); and Cystoscopy (N/A, 10/4/2022).    Assessment  Activity Tolerance: Fair  Impairments Requiring Therapeutic Intervention: decreased functional mobility, decreased ADL status, decreased ROM, decreased strength, decreased safety awareness, decreased cognition, decreased endurance, decreased balance  Prognosis: guarded    Clinical Assessment: At baseline, pt lives at home with his wife where he requires assist for most ADLs but is able to complete

## 2025-07-23 NOTE — PROGRESS NOTES
Office of MD Whitman called by this RN to request renewal of bilateral soft wrist restraint, as patient is still requiring them for patient and staff safety.    Orders renewed

## 2025-07-23 NOTE — PROGRESS NOTES
Occupational Therapy  Per chart review, pt continues on restraints. Will follow and attempt later as appropriate.  Refer to the last note for status if pt is discharged.  Brook VALDES/GIAN,695

## 2025-07-23 NOTE — PROGRESS NOTES
[facility]      Chief Complaint: Patient alert and awake, non-verbal    Subjective/Interval History:    This morning once again the patient is nonverbal.  He does mumble a few words but is not able to be understood.  Sitter at the bedside states he has been directable but still trying to climb out of bed frequently.  No other new problems noted.  Remains in soft restraints.    Review of systems unobtainable.    PMH, PSH, FH/SH reviewed and unchanged as documented in the H&P dated 25    Medication list reviewed    Objective:    BP (!) 145/76   Pulse 69   Temp 97.3 °F (36.3 °C) (Axillary)   Resp 16   Ht 1.753 m (5' 9.02\")   Wt 74.2 kg (163 lb 9.3 oz)   SpO2 100%   BMI 24.15 kg/m²   Temp  Av.4 °F (36.3 °C)  Min: 97.3 °F (36.3 °C)  Max: 97.5 °F (36.4 °C)    RRR  Chest-respirations easy.  Chest is clear to auscultation.  Abd- Soft, NTND   Ext- no edema    The Following Labs Were Reviewed Today:     Recent Results (from the past 24 hours)   Basic Metabolic Panel    Collection Time: 25  7:43 PM   Result Value Ref Range    Sodium 139 136 - 145 mmol/L    Potassium 3.7 3.5 - 5.1 mmol/L    Chloride 104 99 - 110 mmol/L    CO2 22 21 - 32 mmol/L    Anion Gap 13 3 - 16    Glucose 146 (H) 70 - 99 mg/dL    BUN 22 (H) 7 - 20 mg/dL    Creatinine 0.8 0.8 - 1.3 mg/dL    Est, Glom Filt Rate 87 >60    Calcium 10.3 8.3 - 10.6 mg/dL   CBC with Auto Differential    Collection Time: 25  4:35 AM   Result Value Ref Range    WBC 7.7 4.0 - 11.0 K/uL    RBC 3.98 (L) 4.20 - 5.90 M/uL    Hemoglobin 11.7 (L) 13.5 - 17.5 g/dL    Hematocrit 33.7 (L) 40.5 - 52.5 %    MCV 84.6 80.0 - 100.0 fL    MCH 29.5 26.0 - 34.0 pg    MCHC 34.9 31.0 - 36.0 g/dL    RDW 14.1 12.4 - 15.4 %    Platelets 213 135 - 450 K/uL    MPV 8.6 5.0 - 10.5 fL    Neutrophils % 67.3 %    Lymphocytes % 23.1 %    Monocytes % 6.9 %    Eosinophils % 2.0 %    Basophils % 0.7 %    Neutrophils Absolute 5.2 1.7 - 7.7 K/uL    Lymphocytes

## 2025-07-23 NOTE — PROGRESS NOTES
Patient is in bed with family at bedside.  Patient is content and eating.  Sitter is still in the room. Electronically signed by Johanna Gipson RN on 7/23/2025 at 2:53 PM

## 2025-07-23 NOTE — PROGRESS NOTES
Patient has refused all PO medication and drink from this RN. At this time, patient appears worked up, mumbling, shaking, looking scared, and pulling at restraints.     Patient given PRN Haldol to help calm him.

## 2025-07-23 NOTE — PROGRESS NOTES
Patient with large BM. Patient cleaned with soap and water. Preexisting s2 in gluteal cleft reassessed. New zinc paste applied.    Patient heels also reassessed- blanchable redness and musky. Skin prep and heel boots applied.    Patient tolerated well.

## 2025-07-23 NOTE — PROGRESS NOTES
Physical Therapy    Aki Payne  7/23/2025    -chart reviewed  -remains in restraints   -will follow and proceed when he is better able to engage for therapies    Electronically signed by STEFANI GEORGE PT on 7/23/2025 at 10:41 AM

## 2025-07-24 LAB
ANION GAP SERPL CALCULATED.3IONS-SCNC: 6 MMOL/L (ref 3–16)
BACTERIA BLD CULT ORG #2: NORMAL
BACTERIA BLD CULT: NORMAL
BASOPHILS # BLD: 0 K/UL (ref 0–0.2)
BASOPHILS NFR BLD: 0.6 %
BUN SERPL-MCNC: 17 MG/DL (ref 7–20)
CALCIUM SERPL-MCNC: 10.2 MG/DL (ref 8.3–10.6)
CHLORIDE SERPL-SCNC: 109 MMOL/L (ref 99–110)
CO2 SERPL-SCNC: 24 MMOL/L (ref 21–32)
CREAT SERPL-MCNC: 0.7 MG/DL (ref 0.8–1.3)
DEPRECATED RDW RBC AUTO: 14.7 % (ref 12.4–15.4)
EOSINOPHIL # BLD: 0.3 K/UL (ref 0–0.6)
EOSINOPHIL NFR BLD: 4.8 %
GFR SERPLBLD CREATININE-BSD FMLA CKD-EPI: >90 ML/MIN/{1.73_M2}
GLUCOSE SERPL-MCNC: 97 MG/DL (ref 70–99)
HCT VFR BLD AUTO: 34.6 % (ref 40.5–52.5)
HGB BLD-MCNC: 11.6 G/DL (ref 13.5–17.5)
LYMPHOCYTES # BLD: 1.7 K/UL (ref 1–5.1)
LYMPHOCYTES NFR BLD: 29.1 %
MCH RBC QN AUTO: 29.1 PG (ref 26–34)
MCHC RBC AUTO-ENTMCNC: 33.6 G/DL (ref 31–36)
MCV RBC AUTO: 86.6 FL (ref 80–100)
MONOCYTES # BLD: 0.4 K/UL (ref 0–1.3)
MONOCYTES NFR BLD: 7.4 %
NEUTROPHILS # BLD: 3.4 K/UL (ref 1.7–7.7)
NEUTROPHILS NFR BLD: 58.1 %
PLATELET # BLD AUTO: 202 K/UL (ref 135–450)
PMV BLD AUTO: 8.5 FL (ref 5–10.5)
POTASSIUM SERPL-SCNC: 4 MMOL/L (ref 3.5–5.1)
RBC # BLD AUTO: 3.99 M/UL (ref 4.2–5.9)
SODIUM SERPL-SCNC: 139 MMOL/L (ref 136–145)
WBC # BLD AUTO: 5.9 K/UL (ref 4–11)

## 2025-07-24 PROCEDURE — 6370000000 HC RX 637 (ALT 250 FOR IP): Performed by: INTERNAL MEDICINE

## 2025-07-24 PROCEDURE — 80048 BASIC METABOLIC PNL TOTAL CA: CPT

## 2025-07-24 PROCEDURE — 2580000003 HC RX 258: Performed by: INTERNAL MEDICINE

## 2025-07-24 PROCEDURE — 6360000002 HC RX W HCPCS: Performed by: INTERNAL MEDICINE

## 2025-07-24 PROCEDURE — 99231 SBSQ HOSP IP/OBS SF/LOW 25: CPT | Performed by: INTERNAL MEDICINE

## 2025-07-24 PROCEDURE — 94760 N-INVAS EAR/PLS OXIMETRY 1: CPT

## 2025-07-24 PROCEDURE — 1200000000 HC SEMI PRIVATE

## 2025-07-24 PROCEDURE — 85025 COMPLETE CBC W/AUTO DIFF WBC: CPT

## 2025-07-24 PROCEDURE — 36415 COLL VENOUS BLD VENIPUNCTURE: CPT

## 2025-07-24 PROCEDURE — 2500000003 HC RX 250 WO HCPCS: Performed by: INTERNAL MEDICINE

## 2025-07-24 RX ADMIN — CEFTRIAXONE SODIUM 2000 MG: 2 INJECTION, POWDER, FOR SOLUTION INTRAMUSCULAR; INTRAVENOUS at 14:08

## 2025-07-24 RX ADMIN — SODIUM CHLORIDE, PRESERVATIVE FREE 10 ML: 5 INJECTION INTRAVENOUS at 21:00

## 2025-07-24 RX ADMIN — DIVALPROEX SODIUM 125 MG: 125 CAPSULE, COATED PELLETS ORAL at 14:08

## 2025-07-24 RX ADMIN — ENOXAPARIN SODIUM 40 MG: 100 INJECTION SUBCUTANEOUS at 20:57

## 2025-07-24 RX ADMIN — Medication 19.5 MG: at 20:57

## 2025-07-24 RX ADMIN — CITALOPRAM HYDROBROMIDE 10 MG: 10 TABLET ORAL at 11:29

## 2025-07-24 RX ADMIN — DIVALPROEX SODIUM 125 MG: 125 CAPSULE, COATED PELLETS ORAL at 11:29

## 2025-07-24 RX ADMIN — DIVALPROEX SODIUM 125 MG: 125 CAPSULE, COATED PELLETS ORAL at 21:00

## 2025-07-24 RX ADMIN — QUETIAPINE FUMARATE 12.5 MG: 25 TABLET ORAL at 18:43

## 2025-07-24 ASSESSMENT — PAIN SCALES - WONG BAKER
WONGBAKER_NUMERICALRESPONSE: NO HURT
WONGBAKER_NUMERICALRESPONSE: NO HURT

## 2025-07-24 ASSESSMENT — PAIN SCALES - GENERAL
PAINLEVEL_OUTOF10: 6
PAINLEVEL_OUTOF10: 0

## 2025-07-24 ASSESSMENT — PAIN SCALES - PAIN ASSESSMENT IN ADVANCED DEMENTIA (PAINAD)
TOTALSCORE: 6
CONSOLABILITY: UNABLE TO CONSOLE, DISTRACT OR REASSURE
BREATHING: NOISY LABORED BREATHING, LONG PERIODS HYPERVENTILATION, CHEYNE-STOKES RESPIRATIONS
FACIALEXPRESSION: SAD, FRIGHTENED, FROWN
BODYLANGUAGE: TENSE, DISTRESSED PACING, FIDGETING

## 2025-07-24 NOTE — PROGRESS NOTES
Notified Dr kong, on call that restraint order is about to , he is okay to reorder.   Will cont to monitor and assess

## 2025-07-24 NOTE — PROGRESS NOTES
Per night RN patient has been out of restraints since midnight. RN to trial sitter outside of the room during todays shift.

## 2025-07-24 NOTE — CARE COORDINATION
Met with granddaughter Shaina. Both Judd and James are following and considering. Patient has been sitter free since this am.  Goal is return to home after a short rehab stay. [facility] per family request.

## 2025-07-24 NOTE — PLAN OF CARE
Problem: Discharge Planning  Goal: Discharge to home or other facility with appropriate resources  Outcome: Progressing  Flowsheets (Taken 7/21/2025 0830 by Liliya Hawkins, RN)  Discharge to home or other facility with appropriate resources:   Identify barriers to discharge with patient and caregiver   Arrange for needed discharge resources and transportation as appropriate   Identify discharge learning needs (meds, wound care, etc)   Arrange for interpreters to assist at discharge as needed   Refer to discharge planning if patient needs post-hospital services based on physician order or complex needs related to functional status, cognitive ability or social support system     Problem: Safety - Adult  Goal: Free from fall injury  Outcome: Progressing  Flowsheets (Taken 7/23/2025 2305)  Free From Fall Injury: Instruct family/caregiver on patient safety     Problem: Pain  Goal: Verbalizes/displays adequate comfort level or baseline comfort level  Outcome: Progressing  Flowsheets (Taken 7/21/2025 0330 by Hiwot Nuno, RN)  Verbalizes/displays adequate comfort level or baseline comfort level:   Encourage patient to monitor pain and request assistance   Implement non-pharmacological measures as appropriate and evaluate response   Administer analgesics based on type and severity of pain and evaluate response   Assess pain using appropriate pain scale     Problem: ABCDS Injury Assessment  Goal: Absence of physical injury  Outcome: Progressing  Flowsheets (Taken 7/23/2025 2305)  Absence of Physical Injury: Implement safety measures based on patient assessment     Problem: Skin/Tissue Integrity  Goal: Skin integrity remains intact  Description: 1.  Monitor for areas of redness and/or skin breakdown  2.  Assess vascular access sites hourly  3.  Every 4-6 hours minimum:  Change oxygen saturation probe site  4.  Every 4-6 hours:  If on nasal continuous positive airway pressure, respiratory therapy assess nares and

## 2025-07-24 NOTE — PROGRESS NOTES
Patient spouse bedside, Patient remains asleep in bed. Spouse asks that RN does not wake up the patient, and allow him to sleep until \"11 or so\"

## 2025-07-24 NOTE — PLAN OF CARE
Problem: Discharge Planning  Goal: Discharge to home or other facility with appropriate resources  7/24/2025 1120 by Bri Vásquez RN  Outcome: Progressing  Flowsheets (Taken 7/24/2025 1045)  Discharge to home or other facility with appropriate resources: Identify barriers to discharge with patient and caregiver  7/23/2025 2306 by Nevin Reyes RN  Outcome: Progressing  Flowsheets (Taken 7/21/2025 0830 by Liliya Hawkins, RN)  Discharge to home or other facility with appropriate resources:   Identify barriers to discharge with patient and caregiver   Arrange for needed discharge resources and transportation as appropriate   Identify discharge learning needs (meds, wound care, etc)   Arrange for interpreters to assist at discharge as needed   Refer to discharge planning if patient needs post-hospital services based on physician order or complex needs related to functional status, cognitive ability or social support system     Problem: Safety - Adult  Goal: Free from fall injury  7/24/2025 1120 by Bri Vásquez RN  Outcome: Progressing  7/23/2025 2306 by Nevin Reyes RN  Outcome: Progressing  Flowsheets (Taken 7/23/2025 2305)  Free From Fall Injury: Instruct family/caregiver on patient safety     Problem: Pain  Goal: Verbalizes/displays adequate comfort level or baseline comfort level  7/24/2025 1120 by Bri Vásquez RN  Outcome: Progressing  7/23/2025 2306 by Nevin Reyes RN  Outcome: Progressing  Flowsheets  Taken 7/23/2025 2303 by Nevin Reyes RN  Verbalizes/displays adequate comfort level or baseline comfort level: Assess pain using appropriate pain scale  Taken 7/23/2025 1900 by Nevin Reyes RN  Verbalizes/displays adequate comfort level or baseline comfort level: Assess pain using appropriate pain scale  Taken 7/21/2025 0330 by Hiwot Nuno RN  Verbalizes/displays adequate comfort level or baseline comfort level:   Encourage patient to monitor

## 2025-07-24 NOTE — PROGRESS NOTES
[facility] Note      Chief Complaint: Patient alert and awake, non-verbal    Subjective/Interval History:    This morning the patient is sleeping, awakes to voice.  Sitter at the bedside reports she slept all night long.  Currently not in restraints.  No problems noted overnight.    Review of systems unobtainable.    PMH, PSH, FH/SH reviewed and unchanged as documented in the H&P dated 25    Medication list reviewed    Objective:    BP (!) 142/78   Pulse 54   Temp 97.3 °F (36.3 °C) (Axillary)   Resp 16   Ht 1.753 m (5' 9.02\")   Wt 76.5 kg (168 lb 10.4 oz)   SpO2 98%   BMI 24.89 kg/m²   Temp  Av.5 °F (36.4 °C)  Min: 97.3 °F (36.3 °C)  Max: 97.7 °F (36.5 °C)    RRR  Chest-respirations easy.  Chest is clear to auscultation.  Abd- Soft, NTND   Ext- no edema    The Following Labs Were Reviewed Today:     Recent Results (from the past 24 hours)   Basic Metabolic Panel    Collection Time: 25  8:17 PM   Result Value Ref Range    Sodium 137 136 - 145 mmol/L    Potassium 4.3 3.5 - 5.1 mmol/L    Chloride 105 99 - 110 mmol/L    CO2 23 21 - 32 mmol/L    Anion Gap 9 3 - 16    Glucose 109 (H) 70 - 99 mg/dL    BUN 20 7 - 20 mg/dL    Creatinine 0.8 0.8 - 1.3 mg/dL    Est, Glom Filt Rate 87 >60    Calcium 10.1 8.3 - 10.6 mg/dL   CBC with Auto Differential    Collection Time: 25  4:01 AM   Result Value Ref Range    WBC 5.9 4.0 - 11.0 K/uL    RBC 3.99 (L) 4.20 - 5.90 M/uL    Hemoglobin 11.6 (L) 13.5 - 17.5 g/dL    Hematocrit 34.6 (L) 40.5 - 52.5 %    MCV 86.6 80.0 - 100.0 fL    MCH 29.1 26.0 - 34.0 pg    MCHC 33.6 31.0 - 36.0 g/dL    RDW 14.7 12.4 - 15.4 %    Platelets 202 135 - 450 K/uL    MPV 8.5 5.0 - 10.5 fL    Neutrophils % 58.1 %    Lymphocytes % 29.1 %    Monocytes % 7.4 %    Eosinophils % 4.8 %    Basophils % 0.6 %    Neutrophils Absolute 3.4 1.7 - 7.7 K/uL    Lymphocytes Absolute 1.7 1.0 - 5.1 K/uL    Monocytes Absolute 0.4 0.0 - 1.3 K/uL    Eosinophils Absolute 0.3 0.0 - 0.6 K/uL

## 2025-07-25 ENCOUNTER — APPOINTMENT (OUTPATIENT)
Dept: GENERAL RADIOLOGY | Age: 85
DRG: 871 | End: 2025-07-25
Payer: MEDICARE

## 2025-07-25 LAB
ANION GAP SERPL CALCULATED.3IONS-SCNC: 11 MMOL/L (ref 3–16)
BASOPHILS # BLD: 0 K/UL (ref 0–0.2)
BASOPHILS NFR BLD: 0.5 %
BUN SERPL-MCNC: 21 MG/DL (ref 7–20)
CALCIUM SERPL-MCNC: 11 MG/DL (ref 8.3–10.6)
CHLORIDE SERPL-SCNC: 107 MMOL/L (ref 99–110)
CO2 SERPL-SCNC: 22 MMOL/L (ref 21–32)
CREAT SERPL-MCNC: 0.9 MG/DL (ref 0.8–1.3)
DEPRECATED RDW RBC AUTO: 14.7 % (ref 12.4–15.4)
EOSINOPHIL # BLD: 0.1 K/UL (ref 0–0.6)
EOSINOPHIL NFR BLD: 1 %
GFR SERPLBLD CREATININE-BSD FMLA CKD-EPI: 84 ML/MIN/{1.73_M2}
GLUCOSE SERPL-MCNC: 108 MG/DL (ref 70–99)
HCT VFR BLD AUTO: 35.7 % (ref 40.5–52.5)
HGB BLD-MCNC: 12 G/DL (ref 13.5–17.5)
LYMPHOCYTES # BLD: 2.4 K/UL (ref 1–5.1)
LYMPHOCYTES NFR BLD: 25.8 %
MCH RBC QN AUTO: 29.1 PG (ref 26–34)
MCHC RBC AUTO-ENTMCNC: 33.7 G/DL (ref 31–36)
MCV RBC AUTO: 86.5 FL (ref 80–100)
MONOCYTES # BLD: 0.5 K/UL (ref 0–1.3)
MONOCYTES NFR BLD: 5.6 %
NEUTROPHILS # BLD: 6.3 K/UL (ref 1.7–7.7)
NEUTROPHILS NFR BLD: 67.1 %
PLATELET # BLD AUTO: 239 K/UL (ref 135–450)
PMV BLD AUTO: 8.5 FL (ref 5–10.5)
POTASSIUM SERPL-SCNC: 3.7 MMOL/L (ref 3.5–5.1)
PTH-INTACT SERPL-MCNC: 64.6 PG/ML (ref 14–72)
RBC # BLD AUTO: 4.13 M/UL (ref 4.2–5.9)
SODIUM SERPL-SCNC: 140 MMOL/L (ref 136–145)
WBC # BLD AUTO: 9.3 K/UL (ref 4–11)

## 2025-07-25 PROCEDURE — 71045 X-RAY EXAM CHEST 1 VIEW: CPT

## 2025-07-25 PROCEDURE — 94760 N-INVAS EAR/PLS OXIMETRY 1: CPT

## 2025-07-25 PROCEDURE — 97530 THERAPEUTIC ACTIVITIES: CPT

## 2025-07-25 PROCEDURE — 83970 ASSAY OF PARATHORMONE: CPT

## 2025-07-25 PROCEDURE — 6360000002 HC RX W HCPCS: Performed by: INTERNAL MEDICINE

## 2025-07-25 PROCEDURE — 2500000003 HC RX 250 WO HCPCS: Performed by: INTERNAL MEDICINE

## 2025-07-25 PROCEDURE — 99232 SBSQ HOSP IP/OBS MODERATE 35: CPT | Performed by: INTERNAL MEDICINE

## 2025-07-25 PROCEDURE — 80048 BASIC METABOLIC PNL TOTAL CA: CPT

## 2025-07-25 PROCEDURE — 6370000000 HC RX 637 (ALT 250 FOR IP): Performed by: INTERNAL MEDICINE

## 2025-07-25 PROCEDURE — 1200000000 HC SEMI PRIVATE

## 2025-07-25 PROCEDURE — 99222 1ST HOSP IP/OBS MODERATE 55: CPT | Performed by: REGISTERED NURSE

## 2025-07-25 PROCEDURE — 36415 COLL VENOUS BLD VENIPUNCTURE: CPT

## 2025-07-25 PROCEDURE — 2580000003 HC RX 258: Performed by: INTERNAL MEDICINE

## 2025-07-25 PROCEDURE — 85025 COMPLETE CBC W/AUTO DIFF WBC: CPT

## 2025-07-25 RX ORDER — QUETIAPINE FUMARATE 25 MG/1
12.5 TABLET, FILM COATED ORAL 2 TIMES DAILY
Status: DISCONTINUED | OUTPATIENT
Start: 2025-07-25 | End: 2025-07-27

## 2025-07-25 RX ADMIN — ENOXAPARIN SODIUM 40 MG: 100 INJECTION SUBCUTANEOUS at 21:16

## 2025-07-25 RX ADMIN — DIVALPROEX SODIUM 125 MG: 125 CAPSULE, COATED PELLETS ORAL at 08:24

## 2025-07-25 RX ADMIN — QUETIAPINE FUMARATE 12.5 MG: 25 TABLET ORAL at 15:40

## 2025-07-25 RX ADMIN — SODIUM CHLORIDE, PRESERVATIVE FREE 10 ML: 5 INJECTION INTRAVENOUS at 21:16

## 2025-07-25 RX ADMIN — SODIUM CHLORIDE, PRESERVATIVE FREE 10 ML: 5 INJECTION INTRAVENOUS at 08:22

## 2025-07-25 RX ADMIN — Medication 19.5 MG: at 21:12

## 2025-07-25 RX ADMIN — QUETIAPINE FUMARATE 12.5 MG: 25 TABLET ORAL at 21:12

## 2025-07-25 RX ADMIN — CEFTRIAXONE SODIUM 2000 MG: 2 INJECTION, POWDER, FOR SOLUTION INTRAMUSCULAR; INTRAVENOUS at 14:37

## 2025-07-25 RX ADMIN — CITALOPRAM HYDROBROMIDE 10 MG: 10 TABLET ORAL at 08:23

## 2025-07-25 RX ADMIN — DIVALPROEX SODIUM 125 MG: 125 CAPSULE, COATED PELLETS ORAL at 21:12

## 2025-07-25 RX ADMIN — DIVALPROEX SODIUM 125 MG: 125 CAPSULE, COATED PELLETS ORAL at 14:37

## 2025-07-25 ASSESSMENT — PAIN SCALES - GENERAL: PAINLEVEL_OUTOF10: 0

## 2025-07-25 ASSESSMENT — PAIN SCALES - WONG BAKER: WONGBAKER_NUMERICALRESPONSE: NO HURT

## 2025-07-25 NOTE — PROGRESS NOTES
I was made aware that this patient was in 4 point restraints and administration will be made aware via e-mail.

## 2025-07-25 NOTE — PROGRESS NOTES
patient respiration 60 Breath per minutes. Charge Nurse Farnaz at bedside, Notified this RN (Primary) nurse about patient condition, she mentioned she messaged attending  About patient's condition.  Coming within an hour as she got message from the Dr. Nini Merritt with the patient at bedside.     Electronically signed by LISA RICHARDSON RN on 7/25/2025 at 6:04 PM

## 2025-07-25 NOTE — VIRTUAL HEALTH
Aki Payne  [id number]  1940     INPATIENT TELEPSYCHIATRY EVALUATION    07/25/25    Reason for consult:  “Severe agitation secondary to dementia in the evening”    Per chart review, \"85 y.o. male with a history of bladder cx, CAD, dementia, and HLD who presents to the hospital for abnormal labs and somnolence. Pt is unable to provide any hx. He is somnolent and OX0. Wife at bedside provied hx. Pt has had progressive dementia over the last year. Getting severe. Pt having issues with agitation at times and not sleeping at night. Pt has been following w/PCP for this. Recently, about 2 weeks ago, his meds were adjusted. Pt zyprexa was changed to seroquel. He has been on the same doses of depakote and celexa for some time. Ever since this change pt has been more somnolent. Given this, PCP reduced dose on seroquel. This helped, however did not fully resolve the issues. Over these 2 weeks pts oral intake has been poor. He has lost about 15lbs. This Thursday, wife noticed urine in ostomy bag became brown and had a foul odor. She was concerned he had developed another UTI, which is has had recurrence of. Pt has not been complaining of any pain. He has not had any complaints really at all. Just somnolent. Pt was called yesterday and instructed to go to ER given sig abnormal outpt labs\"    Wife currently considering hospice    HPI: Patient is a 85 y.o.  male who presents for increased agitation at night secondary to complicated UTI    AoX0; nonverbal  Calm and pleasant, disoriented    Wife at bedside- was not sleeping at night, getting up around 4am.  Previously on Zyprexa 10mg at 4:00pm and again 9:00pm but not effective.  Patient continued to have insomnia though she denies any aggression or irritability at home.    Zyprexa was discontinued due to ineffectiveness then started Seroquel 25mg QHS 7-10 days ago initially was working well however after 2 days patient became increasingly somnolent however

## 2025-07-25 NOTE — PROGRESS NOTES
Bed alarm was activated at 0715. Nurse responding to room to find three staff members helping patient back to bed, pt had scooted down to the end of the bed attempting to get OOB. Pt is non redirectable at this time. Orders for a virtual sitter was acquired but patient is becoming more agitated and non-compliant, non redirectable. Pt then had pulled off urostomy bag, when nurse attempted to prevent him from pulling off ostomy, pt begun to grab and scratch. On call MD notified of these behaviors, okay to apply restraints. Nurse then put on bilateral wrist restraints. When attempting to do stoma care, pt then begun to kick at staff members. Nurse attempted diversion activities to distract patient while doing stoma care, unsuccessful. After 15 minutes, pt with wrist restraints on swung legs OOB attempting to get up. Nurse attempted redirection and 1-1 with a sitter to prevent this type of behavior, unsuccessful. Pt had brought legs oob several time. When staff attempted to assist him back to bed, patient begun to kick at staff members again. MD notified of these behaviors orders placed for ankle restraints.    Nurse notified Jess VIEYRA, Dr Anderson, Charge RN Maria Alejandra, Nursing supervisor Kateryna, and Nurse manager Kateryna of behavior and need for restraints.    Will cont to monitor and assess

## 2025-07-25 NOTE — PROGRESS NOTES
Patient trying getting out of bed. X-ray Shaina- Dr. Whitman came to see patient. Patient agitated, restless and trying to get out of bed. PCT and RN at bedside now.     Electronically signed by LISA RICHARDSON RN on 7/25/2025 at 6:07 PM

## 2025-07-25 NOTE — PROGRESS NOTES
Charged RN notified about patient have Labored breathing, Oxygen saturation on 100, Pursed lip breathing, this RN (Primary RN,), came immediately to access patient, Seroquel given as ordered. RN closely monitoring at bedside. Son came, Holding patient Hands. Care on going.     Electronically signed by LISA RICHARDSON RN on 7/25/2025 at 3:48 PM

## 2025-07-25 NOTE — PROGRESS NOTES
RT Garcia to the bedside. Assessment preformed, RT states she believes patient's pursed lip breathing is r/t anxiety rather than respiratory distress as patient's oxygen saturation is 100% on RA and lung sounds are clear.     This RN spoke w/ patient's primary RN Sher regarding scheduled Seroquel administration for management. TEJA Olivarez to administer and continue to monitor closely.

## 2025-07-25 NOTE — PROGRESS NOTES
This RN (charge) informed by suhail that patient appears to have labored breathing. Suhail Merritt states that patient had intermittent pursed lip breathing earlier this shift when he was over-stimulated. Patient now w/ continuous pursed-lip breathing. Oxygen saturation checked, noted to be 100% on RA. Call placed to RT Maggy to come to the bedside for evaluation of patient. Maggy states Jose is now the RT assigned to this patient but that an RT will come to the bedside to evaluate shortly.     Update to be provided to patient's primary RN Sher.

## 2025-07-25 NOTE — PROGRESS NOTES
RN check on patient, Patient's spouse and grand daughter at bedside. Denies any needs at this time. Waiting for Therapy to get patient out of bed.     Patient up with OT/PT. OT told RN that patient walks with Gait belt on, with two staff members.

## 2025-07-25 NOTE — PROGRESS NOTES
[facility] Note      Chief Complaint: Patient alert and awake, non-verbal this morning    Subjective/Interval History:    This morning patient is sitting quietly and cooperative.  Last evening he needed to be placed back in 4 point restraints as he was very agitated and difficult to direct as he was trying to climb out of bed.  Patient did receive dose of Seroquel last evening and is quiet and agreeable this morning.  He remains nonverbal.    Review of systems unobtainable.    PMH, PSH, FH/SH reviewed and unchanged as documented in the H&P dated 25    Medication list reviewed    Objective:    BP (!) 146/70   Pulse 60   Temp 97.5 °F (36.4 °C) (Axillary)   Resp 18   Ht 1.753 m (5' 9.02\")   Wt 74.4 kg (164 lb 0.4 oz)   SpO2 100%   BMI 24.21 kg/m²   Temp  Av.6 °F (36.4 °C)  Min: 97.1 °F (36.2 °C)  Max: 97.9 °F (36.6 °C)    RRR  Chest-respirations easy.  Chest is clear to auscultation.  Abd- Soft, NTND   Ext- no edema    The Following Labs Were Reviewed Today:     Recent Results (from the past 24 hours)   CBC with Auto Differential    Collection Time: 25  5:30 AM   Result Value Ref Range    WBC 9.3 4.0 - 11.0 K/uL    RBC 4.13 (L) 4.20 - 5.90 M/uL    Hemoglobin 12.0 (L) 13.5 - 17.5 g/dL    Hematocrit 35.7 (L) 40.5 - 52.5 %    MCV 86.5 80.0 - 100.0 fL    MCH 29.1 26.0 - 34.0 pg    MCHC 33.7 31.0 - 36.0 g/dL    RDW 14.7 12.4 - 15.4 %    Platelets 239 135 - 450 K/uL    MPV 8.5 5.0 - 10.5 fL    Neutrophils % 67.1 %    Lymphocytes % 25.8 %    Monocytes % 5.6 %    Eosinophils % 1.0 %    Basophils % 0.5 %    Neutrophils Absolute 6.3 1.7 - 7.7 K/uL    Lymphocytes Absolute 2.4 1.0 - 5.1 K/uL    Monocytes Absolute 0.5 0.0 - 1.3 K/uL    Eosinophils Absolute 0.1 0.0 - 0.6 K/uL    Basophils Absolute 0.0 0.0 - 0.2 K/uL       ASSESSMENT/PLAN:      Principal Problem:    Complicated UTI -urine culture growing [employer]ger[employer], sensitive to ceftriaxone.  Patient has completed 7 days of

## 2025-07-25 NOTE — PROGRESS NOTES
[facility] - Physical Therapy   Phone: [phone]    Physical Therapy  Facility/Department:[address] ORTHOPEDICS    [] Initial Evaluation            [x] Daily Treatment Note         [] Discharge Summary      Patient: Aki Payne   : 1940   MRN: [id number]   Date of Service:  2025  Staff Mobility Recommendation: strict hands on assist of 2 with gait belt   or   stedy x 2  (second assist for safety)    AM-PAC score:   Discharge Recommendations: SNF     Aki Payne scored a  on the AM-PAC short mobility form. Current research shows that an AM-PAC score of 17 or less is typically not associated with a discharge to the patient's home setting. Based on the patient's AM-PAC score and their current functional mobility deficits, it is recommended that the patient have up to 5 sessions per week of Physical Therapy at d/c to increase the patient's independence.  Please see assessment section for further patient specific details.    If patient discharges prior to next session this note will serve as a discharge summary.  Please see below for the latest assessment towards goals.       Admitting Diagnosis: Complicated UTI (urinary tract infection)  Ordering Physician: J Carlos   Current Admission Summary: here due to AMS, UTI recently     Past Medical History:  has a past medical history of Bladder cancer (HCC), Blood in urine, CAD (coronary artery disease), Colon polyps, Dementia (HCC), DNR (do not resuscitate), and Hyperlipidemia.  Past Surgical History:  has a past surgical history that includes Bladder removal (); Colonoscopy (2010); other surgical history (2013, 2014); Colonoscopy (10/05/2016); Tibia fracture surgery (Right, ); and Cystoscopy (N/A, 10/4/2022).    Assessment  Activity Tolerance: Fair  Impairments Requiring Therapeutic Intervention: decreased functional mobility, decreased ADL status, decreased safety awareness, decreased cognition, decreased endurance,

## 2025-07-25 NOTE — PROGRESS NOTES
Patient resting in bed comfortably, took morning medication without complication, Sitter at bedside. Patient have Rapid breathing at times when agitated, that is the patient baseline per report from night  shift RN. Care on going.     Electronically signed by LISA RICHARDSON RN on 7/25/2025 at 8:33 AM

## 2025-07-25 NOTE — PLAN OF CARE
Problem: Discharge Planning  Goal: Discharge to home or other facility with appropriate resources  7/24/2025 2120 by Nevin Reyes RN  Outcome: Progressing  Flowsheets (Taken 7/24/2025 2021)  Discharge to home or other facility with appropriate resources: Identify barriers to discharge with patient and caregiver  7/24/2025 1120 by Bri Vásquez RN  Outcome: Progressing  Flowsheets (Taken 7/24/2025 1045)  Discharge to home or other facility with appropriate resources: Identify barriers to discharge with patient and caregiver     Problem: Safety - Adult  Goal: Free from fall injury  7/24/2025 2120 by Nevin Reyes RN  Outcome: Progressing  Flowsheets (Taken 7/24/2025 2119)  Free From Fall Injury: Instruct family/caregiver on patient safety  7/24/2025 1120 by Bri Vásquez RN  Outcome: Progressing     Problem: Pain  Goal: Verbalizes/displays adequate comfort level or baseline comfort level  7/24/2025 2120 by Nevin Reyes RN  Outcome: Progressing  Flowsheets (Taken 7/23/2025 2303)  Verbalizes/displays adequate comfort level or baseline comfort level: Assess pain using appropriate pain scale  7/24/2025 1120 by Bri Vásquez RN  Outcome: Progressing     Problem: ABCDS Injury Assessment  Goal: Absence of physical injury  7/24/2025 2120 by Nevin Reyes RN  Outcome: Progressing  Flowsheets (Taken 7/24/2025 2119)  Absence of Physical Injury: Implement safety measures based on patient assessment  7/24/2025 1120 by Bri Vásquez RN  Outcome: Progressing     Problem: Skin/Tissue Integrity  Goal: Skin integrity remains intact  Description: 1.  Monitor for areas of redness and/or skin breakdown  2.  Assess vascular access sites hourly  3.  Every 4-6 hours minimum:  Change oxygen saturation probe site  4.  Every 4-6 hours:  If on nasal continuous positive airway pressure, respiratory therapy assess nares and determine need for appliance change or resting period  7/24/2025 2120  nutrition and hydration  7/24/2025 1120 by Bri Vásquez, RN  Outcome: Progressing  Flowsheets (Taken 7/24/2025 0000 by Nevin Reyes, RN)  Remains free of injury from restraints (restraint for interference with medical device): Every 2 hours: Monitor safety, psychosocial status, comfort, nutrition and hydration

## 2025-07-25 NOTE — PROGRESS NOTES
Bed alarm was activated at 1915. Nurse responding to room to find three staff members helping patient back to bed, pt had scooted down to the end of the bed attempting to get OOB. Pt is non redirectable at this time. Orders for a virtual sitter was acquired but patient is becoming more agitated and non-compliant, non redirectable. Pt then had pulled off urostomy bag, when nurse attempted to prevent him from pulling off ostomy, pt begun to grab and scratch. On call MD notified of these behaviors, okay to apply restraints. Nurse then put on bilateral wrist restraints. When attempting to do stoma care, pt then begun to kick at staff members. Nurse attempted diversion activities to distract patient while doing stoma care, unsuccessful. After 15 minutes, pt with wrist restraints on swung legs OOB attempting to get up. Nurse attempted redirection and 1-1 with a sitter to prevent this type of behavior, unsuccessful. Pt had brought legs oob several time. When staff attempted to assist him back to bed, patient begun to kick at staff members again. MD notified of these behaviors orders placed for ankle restraints.     Nurse notified Jess VIEYRA, Dr Anderson, Charge RN Maria Alejandra, Nursing supervisor Kateryna, and Nurse manager Kateryna of behavior and need for restraints.    Will cont to monitor and assess

## 2025-07-25 NOTE — PROGRESS NOTES
Patient Grand daughter at bedside, upset about Four points restrains on patient, she wants him out of bed, she going to walk him in the unit. RN sent secure message to attending  Waiting for new orders. Care on going.     Electronically signed by LISA RICHARDSON RN on 7/25/2025 at 8:58 AM

## 2025-07-25 NOTE — PROGRESS NOTES
his RN (charge) to the bedside for sounding bed alarm. Patient found attempting to get OOB despite redirection from suhail Merritt. Patient continues w/ pursed lip breathing, respirations elevated at 60 breaths/minute.     Patient helped up OOB w/ denys RN, suhail Merritt, and W[facility] Mayo as patient could not be redirected to sit in the bed. Patient assisted to the couch x3 CGA w/ a gait belt. Patient sat on couch and vitals were obtained. Vitals are as follows:     Temp 97.5 axillary   HR 86   RR 60   /76  O2 100% on RA    Message sent to MD Whitman regarding ongoing pursed lip breathing & elevated respiration rate. MD called this RN, states this can be typical behavior for the patient when agitated / anxious. Request made for chest xray and ABG. MD states he will be by shortly to evaluate patient at the bedside and will place appropriate orders at that time.      Patient assisted back to bed w/ denys RN, Suhail Merritt, and W[facility] Mayo w/ gait belt. No further needs identified at this time.     Update provided to primary RN Sher.

## 2025-07-25 NOTE — PROGRESS NOTES
Comprehensive Nutrition Assessment    Type and Reason for Visit:  Reassess    Nutrition Recommendations/Plan:   Continue current diet.  Continue ONS.      Malnutrition Assessment:  Malnutrition Status:  At risk for malnutrition (07/21/25 7763)    Context:  Chronic Illness       Nutrition Assessment:    FU - Pt. continues on a cardiac diet. Diet acceptance has improved over admission however declined BF this am by shutting his mouth for nurse aide. Ensure switched to clear at last assessment to support preference for sweet beverages. Nutrition labs reviewed.Stage I noted to tari per flowsheet documentation. No new recommendations at this time. Will continue to monitor nutritional adequacy and diet acceptance while inpatient.    Nutrition Related Findings:    7/24 Nutrition labs reviewed. LBM 7/24. Wound Type: Stage I       Current Nutrition Intake & Therapies:    Average Meal Intake: 51-75%, %  Average Supplements Intake: % (7/22)  ADULT ORAL NUTRITION SUPPLEMENT; Breakfast, Dinner; Clear Liquid Oral Supplement  ADULT DIET; Regular; Low Fat/Low Chol/High Fiber/ADRIANNA    Anthropometric Measures:  Height: 175.3 cm (5' 9.02\")  Ideal Body Weight (IBW): 160 lbs (73 kg)    Admission Body Weight: 75 kg (165 lb 5.5 oz)  Current Body Weight: 74.4 kg (164 lb 0.4 oz), 102.7 % IBW. Weight Source: Bed scale  Current BMI (kg/m2): 24.2           Weight Adjustment For: No Adjustment                 BMI Categories: Normal Weight (BMI 22.0 to 24.9) age over 65    Estimated Daily Nutrient Needs:  Energy Requirements Based On: Kcal/kg  Weight Used for Energy Requirements: Current  Energy (kcal/day): 1863-2235kcal (25-30kcal/74.5kg)  Weight Used for Protein Requirements: Current  Protein (g/day): 75-89g (1.0-1.2g/74.5kg)  Method Used for Fluid Requirements: 1 ml/kcal  Fluid (ml/day):      Nutrition Diagnosis:   Predicted inadequate energy intake related to cognitive or neurological impairment as evidenced by variable po

## 2025-07-25 NOTE — PROGRESS NOTES
[facility] - Occupational Therapy   Phone: [phone]    Occupational Therapy  Facility/Department:[address] ORTHOPEDICS    [] Initial Evaluation            [x] Daily Treatment Note         [] Discharge Summary      Patient: Aki Payne   : 1940   MRN: [id number]   Date of Service:  2025    Staff Mobility Recommendation: hands on assist of 2 with gait belt    AM-PAC Score: 10/24  Discharge Recommendations: SNF vs  assist of 2    Admitting Diagnosis:  Complicated UTI (urinary tract infection)  Ordering Physician: Lionel  Current Admission Summary: Per H&P: Aki Payne is a 85 y.o. male with a history of bladder cx, CAD, dementia, and HLD who presents to the hospital for abnormal labs and somnolence. Pt has had progressive dementia over the last year. Getting severe. Pt having issues with agitation at times and not sleeping at night. Pt has been following w/PCP for this. Recently, about 2 weeks ago, his meds were adjusted. Pt zyprexa was changed to seroquel. He has been on the same doses of depakote and celexa for some time. Ever since this change pt has been more somnolent.     Past Medical History:  has a past medical history of Bladder cancer (HCC), Blood in urine, CAD (coronary artery disease), Colon polyps, Dementia (HCC), DNR (do not resuscitate), and Hyperlipidemia.  Past Surgical History:  has a past surgical history that includes Bladder removal (); Colonoscopy (2010); other surgical history (2013, 2014); Colonoscopy (10/05/2016); Tibia fracture surgery (Right, ); and Cystoscopy (N/A, 10/4/2022).    Assessment  Activity Tolerance: Fair  Impairments Requiring Therapeutic Intervention: decreased functional mobility, decreased ADL status, decreased ROM, decreased strength, decreased safety awareness, decreased cognition, decreased endurance, decreased balance  Prognosis: guarded    Clinical Assessment: At baseline, pt lives at home with his wife where he requires

## 2025-07-25 NOTE — PROGRESS NOTES
Patient out of Restrains, Spouse and Granddaughter at bedside. Tele camera on, Sitter outside the roomCare on going.     Electronically signed by LISA RICHARDSON RN on 7/25/2025 at 6:59 PM

## 2025-07-25 NOTE — CARE COORDINATION
Received call from Bri from [facility] this am--[facility] has declined patient due to his behaviors related to dementia.   Received message from Stepahn at [facility] requesting more information ([phone]). Left Stephan a message.   Aware family is interested in [facility]. Left Sadia at [facility] a message.  Spoke with spouse and granddaughter Shaina present in room regarding facilities that may be able to accommodate patient's with dementia and provided list generated from [facility] with Medicare ratings of 4-5 stars. They will review the list and let Sw know choices. Sw number provided.   Shaina asked about patient returning home. Explained this could be an option but patient would need 24/7 hands on care/assistance at this time.    Electronically signed by ALIE Narayanan, JOSELIN, Case Management on 7/25/2025 at 11:45 AM  [email or web address] [phone]    11:48 AM  Received call back from Sadia at [facility]. She stated patient would need to be completely behavior free for 48 hours before [facility] would consider an admit.    Electronically signed by ALIE Narayanan, JOSELIN, Case Management on 7/25/2025 at 11:49 AM  [email or web address] [phone]    12:04 PM  Updated patient's spouse and granddaughter Shaina regarding [facility] decision. Informed them that rep from [facility] [facility] was available if they would want to ask any questions--they declined. Granddaughter did report she would be agreeable to referral to [facility]. Referral made.     Electronically signed by ALIE Narayanan LISW, Case Management on 7/25/2025 at 12:05 PM  [email or web address] [phone]    4:43 PM  [facility] unable to accept patient. Lupe from [facility] left message--they may have a bed on Monday.    Electronically signed by ALIE Narayanan LISW, Case Management on 7/25/2025 at 4:43 PM  [email or web address] [phone]

## 2025-07-26 LAB
ANION GAP SERPL CALCULATED.3IONS-SCNC: 13 MMOL/L (ref 3–16)
BASOPHILS # BLD: 0.1 K/UL (ref 0–0.2)
BASOPHILS NFR BLD: 1 %
BUN SERPL-MCNC: 25 MG/DL (ref 7–20)
CALCIUM SERPL-MCNC: 10.7 MG/DL (ref 8.3–10.6)
CHLORIDE SERPL-SCNC: 107 MMOL/L (ref 99–110)
CO2 SERPL-SCNC: 21 MMOL/L (ref 21–32)
CREAT SERPL-MCNC: 0.9 MG/DL (ref 0.8–1.3)
DEPRECATED RDW RBC AUTO: 14.9 % (ref 12.4–15.4)
EOSINOPHIL # BLD: 0.3 K/UL (ref 0–0.6)
EOSINOPHIL NFR BLD: 4.5 %
GFR SERPLBLD CREATININE-BSD FMLA CKD-EPI: 84 ML/MIN/{1.73_M2}
GLUCOSE SERPL-MCNC: 102 MG/DL (ref 70–99)
HCT VFR BLD AUTO: 35.6 % (ref 40.5–52.5)
HGB BLD-MCNC: 12 G/DL (ref 13.5–17.5)
LYMPHOCYTES # BLD: 2.4 K/UL (ref 1–5.1)
LYMPHOCYTES NFR BLD: 35 %
MCH RBC QN AUTO: 29 PG (ref 26–34)
MCHC RBC AUTO-ENTMCNC: 33.6 G/DL (ref 31–36)
MCV RBC AUTO: 86.3 FL (ref 80–100)
MONOCYTES # BLD: 0.6 K/UL (ref 0–1.3)
MONOCYTES NFR BLD: 8.7 %
NEUTROPHILS # BLD: 3.5 K/UL (ref 1.7–7.7)
NEUTROPHILS NFR BLD: 50.8 %
PLATELET # BLD AUTO: 216 K/UL (ref 135–450)
PMV BLD AUTO: 8.6 FL (ref 5–10.5)
POTASSIUM SERPL-SCNC: 3.4 MMOL/L (ref 3.5–5.1)
RBC # BLD AUTO: 4.12 M/UL (ref 4.2–5.9)
SODIUM SERPL-SCNC: 141 MMOL/L (ref 136–145)
WBC # BLD AUTO: 6.8 K/UL (ref 4–11)

## 2025-07-26 PROCEDURE — 6360000002 HC RX W HCPCS: Performed by: INTERNAL MEDICINE

## 2025-07-26 PROCEDURE — 99232 SBSQ HOSP IP/OBS MODERATE 35: CPT | Performed by: STUDENT IN AN ORGANIZED HEALTH CARE EDUCATION/TRAINING PROGRAM

## 2025-07-26 PROCEDURE — 94760 N-INVAS EAR/PLS OXIMETRY 1: CPT

## 2025-07-26 PROCEDURE — 36415 COLL VENOUS BLD VENIPUNCTURE: CPT

## 2025-07-26 PROCEDURE — 6370000000 HC RX 637 (ALT 250 FOR IP): Performed by: INTERNAL MEDICINE

## 2025-07-26 PROCEDURE — 6370000000 HC RX 637 (ALT 250 FOR IP): Performed by: STUDENT IN AN ORGANIZED HEALTH CARE EDUCATION/TRAINING PROGRAM

## 2025-07-26 PROCEDURE — 80048 BASIC METABOLIC PNL TOTAL CA: CPT

## 2025-07-26 PROCEDURE — 2500000003 HC RX 250 WO HCPCS: Performed by: INTERNAL MEDICINE

## 2025-07-26 PROCEDURE — 85025 COMPLETE CBC W/AUTO DIFF WBC: CPT

## 2025-07-26 PROCEDURE — 1200000000 HC SEMI PRIVATE

## 2025-07-26 RX ORDER — POTASSIUM CHLORIDE 7.45 MG/ML
10 INJECTION INTRAVENOUS PRN
Status: DISCONTINUED | OUTPATIENT
Start: 2025-07-26 | End: 2025-07-29 | Stop reason: HOSPADM

## 2025-07-26 RX ORDER — POTASSIUM CHLORIDE 1500 MG/1
40 TABLET, EXTENDED RELEASE ORAL PRN
Status: DISCONTINUED | OUTPATIENT
Start: 2025-07-26 | End: 2025-07-29 | Stop reason: HOSPADM

## 2025-07-26 RX ADMIN — DIVALPROEX SODIUM 125 MG: 125 CAPSULE, COATED PELLETS ORAL at 10:15

## 2025-07-26 RX ADMIN — QUETIAPINE FUMARATE 12.5 MG: 25 TABLET ORAL at 16:23

## 2025-07-26 RX ADMIN — DIVALPROEX SODIUM 125 MG: 125 CAPSULE, COATED PELLETS ORAL at 16:23

## 2025-07-26 RX ADMIN — SODIUM CHLORIDE, PRESERVATIVE FREE 10 ML: 5 INJECTION INTRAVENOUS at 20:31

## 2025-07-26 RX ADMIN — Medication 19.5 MG: at 20:30

## 2025-07-26 RX ADMIN — DIVALPROEX SODIUM 125 MG: 125 CAPSULE, COATED PELLETS ORAL at 20:31

## 2025-07-26 RX ADMIN — POTASSIUM BICARBONATE 40 MEQ: 391 TABLET, EFFERVESCENT ORAL at 16:24

## 2025-07-26 RX ADMIN — ENOXAPARIN SODIUM 40 MG: 100 INJECTION SUBCUTANEOUS at 20:31

## 2025-07-26 RX ADMIN — SODIUM CHLORIDE, PRESERVATIVE FREE 10 ML: 5 INJECTION INTRAVENOUS at 10:15

## 2025-07-26 RX ADMIN — QUETIAPINE FUMARATE 12.5 MG: 25 TABLET ORAL at 20:31

## 2025-07-26 RX ADMIN — CITALOPRAM HYDROBROMIDE 10 MG: 10 TABLET ORAL at 10:15

## 2025-07-26 ASSESSMENT — PAIN SCALES - PAIN ASSESSMENT IN ADVANCED DEMENTIA (PAINAD)
FACIALEXPRESSION: SAD, FRIGHTENED, FROWN
TOTALSCORE: 4
CONSOLABILITY: NO NEED TO CONSOLE
BREATHING: NOISY LABORED BREATHING, LONG PERIODS HYPERVENTILATION, CHEYNE-STOKES RESPIRATIONS
TOTALSCORE: 4
CONSOLABILITY: NO NEED TO CONSOLE
FACIALEXPRESSION: SAD, FRIGHTENED, FROWN
BREATHING: NOISY LABORED BREATHING, LONG PERIODS HYPERVENTILATION, CHEYNE-STOKES RESPIRATIONS
BODYLANGUAGE: TENSE, DISTRESSED PACING, FIDGETING
BODYLANGUAGE: TENSE, DISTRESSED PACING, FIDGETING

## 2025-07-26 ASSESSMENT — PAIN SCALES - WONG BAKER: WONGBAKER_NUMERICALRESPONSE: NO HURT

## 2025-07-26 NOTE — PLAN OF CARE
Problem: Discharge Planning  Goal: Discharge to home or other facility with appropriate resources  Outcome: Progressing  Flowsheets (Taken 7/26/2025 0040)  Discharge to home or other facility with appropriate resources:   Identify discharge learning needs (meds, wound care, etc)   Identify barriers to discharge with patient and caregiver   Refer to discharge planning if patient needs post-hospital services based on physician order or complex needs related to functional status, cognitive ability or social support system   Arrange for needed discharge resources and transportation as appropriate     Problem: Safety - Adult  Goal: Free from fall injury  Outcome: Progressing  Flowsheets (Taken 7/26/2025 0040)  Free From Fall Injury: Instruct family/caregiver on patient safety     Problem: Pain  Goal: Verbalizes/displays adequate comfort level or baseline comfort level  Outcome: Progressing  Flowsheets (Taken 7/26/2025 0040)  Verbalizes/displays adequate comfort level or baseline comfort level:   Consider cultural and social influences on pain and pain management   Administer analgesics based on type and severity of pain and evaluate response   Encourage patient to monitor pain and request assistance   Implement non-pharmacological measures as appropriate and evaluate response   Notify Licensed Independent Practitioner if interventions unsuccessful or patient reports new pain   Assess pain using appropriate pain scale     Problem: ABCDS Injury Assessment  Goal: Absence of physical injury  Outcome: Progressing  Flowsheets (Taken 7/26/2025 0040)  Absence of Physical Injury: Implement safety measures based on patient assessment     Problem: Skin/Tissue Integrity  Goal: Skin integrity remains intact  Description: 1.  Monitor for areas of redness and/or skin breakdown  2.  Assess vascular access sites hourly  3.  Every 4-6 hours minimum:  Change oxygen saturation probe site  4.  Every 4-6 hours:  If on nasal continuous

## 2025-07-26 NOTE — PROGRESS NOTES
Order noted for bilateral wrist restraints. Upon assuming care of patient in report, patient did not have restraints applied. Per night shift RN restraints were not applied throughout the night. Order for bilateral wrist restraints was removed by this RN per protocol. Patient sleeping in bed, no combative behaviors or agitation noted at this time. Bedside sitter outside room. Tele camera in place.   Electronically signed by Olya Rodriguez RN on 7/26/2025 at 10:56 AM

## 2025-07-26 NOTE — PROGRESS NOTES
Patient's K was 3.4 this AM. Replacement protocol not ordered. Perfect serve message sent to Dr. Anderson for replacement orders. Awaiting response.   Electronically signed by Olya Rodriguez RN on 7/26/2025 at 4:13 PM

## 2025-07-26 NOTE — PROGRESS NOTES
Internal Medicine Hospital Progress Note    Patient:  Aki Payne 85 y.o. male MRN: [id number]     Date of Service: 7/26/2025    Allergy: Aricept [donepezil hcl] and Namenda [memantine hcl]  CC: F/u:   Brief Course   Aki Payne was admitted on 7/19/2025 for Complicated UTI (urinary tract infection). Aki Payne has been admitted for 7 days    24 hr interval hx:  No restrains in past 24 hours.     Objective     Physical Exam:  Vitals: /81   Pulse 65   Temp 97.7 °F (36.5 °C)   Resp 17   Ht 1.753 m (5' 9.02\")   Wt 75.9 kg (167 lb 5.3 oz)   SpO2 99%   BMI 24.70 kg/m²     General:Somnolent. No acute distress  Eyes: PEERL. No scleral icterus noted. Normal appearing conjunctiva bilaterally  Ears: Normal TM and external canal bilaterally.  Oral cavity/Throat: No pharyngeal erythema. No oral lesions.  Cardiovascular: Heart is regular rate and rhythm. No murmurs noted. Radial pulses 2+. Posterior tibial pulses 2+. No lower extremity edema noted  Pulmonary: Lungs clear to auscultation bilaterally  Skin: Dry, warm. No obvious skin lesions or rashes noted.  Neuro: PEERL. EOM intact. No facial droop. Normal sensation in bilateral upper and lower extremities. Gait is norm    Pertinent/ New Labs and Imaging Studies   Labs reviewed. Pertinent labs noted in assessment and plan      Assessment and Plan   Aki Payne was admitted to hospital for Complicated UTI (urinary tract infection)    #Compicated UTI  Culture grew providentia rettgeri  S/p 7 days of ceftriaxone    #Dementia with agitation  - Psychiatry following  - Continue divalproex 125 mg PO TID  - Continue melatonin 20 mg PO nightly  - Continue quetiapine 12.5 mg PO BID        DVT Prophylaxis: Enoxaparin  Code:Full  Disposition: Pending placement    Sy Anderson MD  [facility]  Office [phone]  Cell [phone]

## 2025-07-26 NOTE — PLAN OF CARE
Problem: Discharge Planning  Goal: Discharge to home or other facility with appropriate resources  Outcome: Progressing  Flowsheets (Taken 7/26/2025 1555)  Discharge to home or other facility with appropriate resources:   Identify barriers to discharge with patient and caregiver   Identify discharge learning needs (meds, wound care, etc)   Refer to discharge planning if patient needs post-hospital services based on physician order or complex needs related to functional status, cognitive ability or social support system   Arrange for needed discharge resources and transportation as appropriate     Problem: Safety - Adult  Goal: Free from fall injury  Outcome: Progressing  Flowsheets (Taken 7/26/2025 1555)  Free From Fall Injury: Instruct family/caregiver on patient safety     Problem: Pain  Goal: Verbalizes/displays adequate comfort level or baseline comfort level  Outcome: Progressing  Flowsheets (Taken 7/26/2025 1555)  Verbalizes/displays adequate comfort level or baseline comfort level:   Encourage patient to monitor pain and request assistance   Administer analgesics based on type and severity of pain and evaluate response   Assess pain using appropriate pain scale   Implement non-pharmacological measures as appropriate and evaluate response   Notify Licensed Independent Practitioner if interventions unsuccessful or patient reports new pain     Problem: ABCDS Injury Assessment  Goal: Absence of physical injury  Outcome: Progressing  Flowsheets (Taken 7/26/2025 0040 by Kasandra Paez, RN)  Absence of Physical Injury: Implement safety measures based on patient assessment     Problem: Skin/Tissue Integrity  Goal: Skin integrity remains intact  Description: 1.  Monitor for areas of redness and/or skin breakdown  2.  Assess vascular access sites hourly  3.  Every 4-6 hours minimum:  Change oxygen saturation probe site  4.  Every 4-6 hours:  If on nasal continuous positive airway pressure, respiratory therapy assess

## 2025-07-26 NOTE — PROGRESS NOTES
Patient is disoriented x4, wakes to voice, resting in bed, on room air. Gets up x2 assist with gait belt.R arm PIV normal saline locked, flushed easily. 2/4 bed rails up, bed in lowest position, fall precautions in place, bed alarm on, call light within reach. Morning medications given as ordered, tolerated well crushed in applesauce. No pain noted. Patient's wife at bedside. RLQ urostomy in place, draining yellow urine. VSC in place. Will cont to monitor and reassess.  Electronically signed by Olya Rodriguez RN on 7/26/2025 at 4:00 PM

## 2025-07-26 NOTE — PROGRESS NOTES
Patient is disoriented x 4. Vital, assessment and daily care given. Education and care plan updated. Medication given as per order. Fall precaution initiated, call light within reach, bed in low position and wheels locked. Patient moves CGA X 3, room air and VSC. Addressed current patient's need. Continue to monitor.       Electronically signed by Kasandra Paez RN on 7/26/2025 at 1:02 AM

## 2025-07-27 LAB
ANION GAP SERPL CALCULATED.3IONS-SCNC: 14 MMOL/L (ref 3–16)
BASOPHILS # BLD: 0.1 K/UL (ref 0–0.2)
BASOPHILS NFR BLD: 0.6 %
BUN SERPL-MCNC: 23 MG/DL (ref 7–20)
CALCIUM SERPL-MCNC: 11.1 MG/DL (ref 8.3–10.6)
CHLORIDE SERPL-SCNC: 109 MMOL/L (ref 99–110)
CO2 SERPL-SCNC: 21 MMOL/L (ref 21–32)
CREAT SERPL-MCNC: 0.9 MG/DL (ref 0.8–1.3)
DEPRECATED RDW RBC AUTO: 15.1 % (ref 12.4–15.4)
EOSINOPHIL # BLD: 0.3 K/UL (ref 0–0.6)
EOSINOPHIL NFR BLD: 3.1 %
GFR SERPLBLD CREATININE-BSD FMLA CKD-EPI: 84 ML/MIN/{1.73_M2}
GLUCOSE SERPL-MCNC: 92 MG/DL (ref 70–99)
HCT VFR BLD AUTO: 36.7 % (ref 40.5–52.5)
HGB BLD-MCNC: 12.3 G/DL (ref 13.5–17.5)
LYMPHOCYTES # BLD: 2.7 K/UL (ref 1–5.1)
LYMPHOCYTES NFR BLD: 28.3 %
MCH RBC QN AUTO: 28.9 PG (ref 26–34)
MCHC RBC AUTO-ENTMCNC: 33.5 G/DL (ref 31–36)
MCV RBC AUTO: 86.1 FL (ref 80–100)
MONOCYTES # BLD: 0.8 K/UL (ref 0–1.3)
MONOCYTES NFR BLD: 8.6 %
NEUTROPHILS # BLD: 5.6 K/UL (ref 1.7–7.7)
NEUTROPHILS NFR BLD: 59.4 %
PLATELET # BLD AUTO: 249 K/UL (ref 135–450)
PMV BLD AUTO: 8.4 FL (ref 5–10.5)
POTASSIUM SERPL-SCNC: 4.1 MMOL/L (ref 3.5–5.1)
RBC # BLD AUTO: 4.26 M/UL (ref 4.2–5.9)
SODIUM SERPL-SCNC: 144 MMOL/L (ref 136–145)
WBC # BLD AUTO: 9.5 K/UL (ref 4–11)

## 2025-07-27 PROCEDURE — 6370000000 HC RX 637 (ALT 250 FOR IP): Performed by: INTERNAL MEDICINE

## 2025-07-27 PROCEDURE — 6370000000 HC RX 637 (ALT 250 FOR IP): Performed by: STUDENT IN AN ORGANIZED HEALTH CARE EDUCATION/TRAINING PROGRAM

## 2025-07-27 PROCEDURE — 36415 COLL VENOUS BLD VENIPUNCTURE: CPT

## 2025-07-27 PROCEDURE — 85025 COMPLETE CBC W/AUTO DIFF WBC: CPT

## 2025-07-27 PROCEDURE — 80048 BASIC METABOLIC PNL TOTAL CA: CPT

## 2025-07-27 PROCEDURE — 99232 SBSQ HOSP IP/OBS MODERATE 35: CPT | Performed by: STUDENT IN AN ORGANIZED HEALTH CARE EDUCATION/TRAINING PROGRAM

## 2025-07-27 PROCEDURE — 6360000002 HC RX W HCPCS: Performed by: INTERNAL MEDICINE

## 2025-07-27 PROCEDURE — 1200000000 HC SEMI PRIVATE

## 2025-07-27 PROCEDURE — 94760 N-INVAS EAR/PLS OXIMETRY 1: CPT

## 2025-07-27 PROCEDURE — 2500000003 HC RX 250 WO HCPCS: Performed by: INTERNAL MEDICINE

## 2025-07-27 RX ORDER — QUETIAPINE FUMARATE 25 MG/1
12.5 TABLET, FILM COATED ORAL 2 TIMES DAILY
Status: DISCONTINUED | OUTPATIENT
Start: 2025-07-27 | End: 2025-07-29 | Stop reason: HOSPADM

## 2025-07-27 RX ORDER — QUETIAPINE FUMARATE 25 MG/1
25 TABLET, FILM COATED ORAL 2 TIMES DAILY
Status: DISCONTINUED | OUTPATIENT
Start: 2025-07-27 | End: 2025-07-27

## 2025-07-27 RX ADMIN — QUETIAPINE FUMARATE 12.5 MG: 25 TABLET ORAL at 22:09

## 2025-07-27 RX ADMIN — QUETIAPINE FUMARATE 25 MG: 25 TABLET ORAL at 16:10

## 2025-07-27 RX ADMIN — DIVALPROEX SODIUM 125 MG: 125 CAPSULE, COATED PELLETS ORAL at 22:08

## 2025-07-27 RX ADMIN — DIVALPROEX SODIUM 125 MG: 125 CAPSULE, COATED PELLETS ORAL at 08:57

## 2025-07-27 RX ADMIN — DIVALPROEX SODIUM 125 MG: 125 CAPSULE, COATED PELLETS ORAL at 16:10

## 2025-07-27 RX ADMIN — Medication 19.5 MG: at 22:09

## 2025-07-27 RX ADMIN — SODIUM CHLORIDE, PRESERVATIVE FREE 10 ML: 5 INJECTION INTRAVENOUS at 22:20

## 2025-07-27 RX ADMIN — SODIUM CHLORIDE, PRESERVATIVE FREE 10 ML: 5 INJECTION INTRAVENOUS at 08:57

## 2025-07-27 RX ADMIN — ENOXAPARIN SODIUM 40 MG: 100 INJECTION SUBCUTANEOUS at 22:19

## 2025-07-27 RX ADMIN — CITALOPRAM HYDROBROMIDE 10 MG: 10 TABLET ORAL at 08:57

## 2025-07-27 ASSESSMENT — PAIN SCALES - WONG BAKER
WONGBAKER_NUMERICALRESPONSE: NO HURT
WONGBAKER_NUMERICALRESPONSE: NO HURT

## 2025-07-27 NOTE — PROGRESS NOTES
Patient is alert & disoriented x4, resting in bed, on room air. Gets up x2 assist with gait belt. R forearm IV normal saline locked, flushed easily. 2/4 bed rails up, bed in lowest position, fall precautions in place, call light within reach.  at bedside. No pain noted. Patient given morning medications as ordered, crushed in applesauce. Patient increasing impulsivity and agitation. Continuing to redirect patient. Attempted to get patient OOB with PCA using gait belt, patient very unsteady on feet leaning backward. Patient returned to bed. Patient's wife educated it is unsafe to try to walk patient at this time due to safety/ fall risk. Patient's wife states understanding. Will cont to monitor and reassess.  Electronically signed by Olya Rodriguez RN on 7/27/2025 at 11:24 AM

## 2025-07-27 NOTE — PROGRESS NOTES
Internal Medicine Hospital Progress Note    Patient:  Aki Payne 85 y.o. male MRN: [id number]     Date of Service: 7/27/2025    Allergy: Aricept [donepezil hcl] and Namenda [memantine hcl]  CC: F/u:   Brief Course   Aki Payne was admitted on 7/19/2025 for Complicated UTI (urinary tract infection). Aki Payne has been admitted for 8 days    24 hr interval hx:  More agitated today    Objective     Physical Exam:  Vitals: /74   Pulse 69   Temp 97.3 °F (36.3 °C) (Axillary)   Resp 18   Ht 1.753 m (5' 9.02\")   Wt 75.9 kg (167 lb 5.3 oz)   SpO2 94%   BMI 24.70 kg/m²     General:Somnolent. No acute distress  Eyes: PEERL. No scleral icterus noted. Normal appearing conjunctiva bilaterally  Cardiovascular: Heart is regular rate and rhythm. No murmurs noted. Radial pulses 2+. Posterior tibial pulses 2+. No lower extremity edema noted  Pulmonary: Lungs clear to auscultation bilaterally  Skin: Dry, warm. No obvious skin lesions or rashes noted.  Neuro: PEERL.     Pertinent/ New Labs and Imaging Studies   Labs reviewed. Pertinent labs noted in assessment and plan      Assessment and Plan   Aki Payne was admitted to hospital for Complicated UTI (urinary tract infection)    #Compicated UTI  Culture grew providentia rettgeri  S/p 7 days of ceftriaxone    #Dementia with agitation  - Psychiatry following  - Continue divalproex 125 mg PO TID  - Continue melatonin 20 mg PO nightly  - Increase quetiapine to 25 mg PO BID        DVT Prophylaxis: Enoxaparin  Code:Full  Disposition: Pending placement    Sy Anderson MD  [facility]  Office [phone]  Cell [phone]

## 2025-07-27 NOTE — PLAN OF CARE
Problem: Discharge Planning  Goal: Discharge to home or other facility with appropriate resources  7/27/2025 0755 by Olya Rodriguez RN  Outcome: Progressing  Flowsheets (Taken 7/26/2025 2008 by Kasandra Paez RN)  Discharge to home or other facility with appropriate resources:   Identify barriers to discharge with patient and caregiver   Arrange for needed discharge resources and transportation as appropriate   Identify discharge learning needs (meds, wound care, etc)   Arrange for interpreters to assist at discharge as needed   Refer to discharge planning if patient needs post-hospital services based on physician order or complex needs related to functional status, cognitive ability or social support system     Problem: Safety - Adult  Goal: Free from fall injury  7/27/2025 0755 by Olya Rodriguez RN  Outcome: Progressing  Flowsheets (Taken 7/26/2025 2008 by Kasandra Paez RN)  Free From Fall Injury: Instruct family/caregiver on patient safety     Problem: Pain  Goal: Verbalizes/displays adequate comfort level or baseline comfort level  7/27/2025 0755 by Olya Rodriguez RN  Outcome: Progressing  Flowsheets (Taken 7/26/2025 2008 by Kasandra Paez RN)  Verbalizes/displays adequate comfort level or baseline comfort level:   Encourage patient to monitor pain and request assistance   Assess pain using appropriate pain scale   Administer analgesics based on type and severity of pain and evaluate response   Implement non-pharmacological measures as appropriate and evaluate response   Notify Licensed Independent Practitioner if interventions unsuccessful or patient reports new pain   Consider cultural and social influences on pain and pain management     Problem: ABCDS Injury Assessment  Goal: Absence of physical injury  7/27/2025 0755 by Olya Rodriguez RN  Outcome: Progressing  Flowsheets (Taken 7/26/2025 2008 by Kasandra Paez RN)  Absence of Physical Injury: Implement safety measures based on patient assessment    0755)  Absence of infection at discharge: Assess and monitor for signs and symptoms of infection     Problem: Infection - Adult  Goal: Absence of infection during hospitalization  7/27/2025 0755 by Olya Rodriguez RN  Outcome: Progressing     Problem: Infection - Adult  Goal: Absence of fever/infection during anticipated neutropenic period  7/27/2025 0755 by Olya Rodriguez RN  Outcome: Progressing     Problem: Metabolic/Fluid and Electrolytes - Adult  Goal: Electrolytes maintained within normal limits  7/27/2025 0755 by Olya Rodriguez RN  Outcome: Progressing  Flowsheets (Taken 7/27/2025 0755)  Electrolytes maintained within normal limits:   Monitor labs and assess patient for signs and symptoms of electrolyte imbalances   Administer electrolyte replacement as ordered     Problem: Metabolic/Fluid and Electrolytes - Adult  Goal: Hemodynamic stability and optimal renal function maintained  7/27/2025 0755 by Olya Rodriguez RN  Outcome: Progressing     Problem: Metabolic/Fluid and Electrolytes - Adult  Goal: Glucose maintained within prescribed range  7/27/2025 0755 by Olya Rodriguez RN  Outcome: Progressing     Problem: Hematologic - Adult  Goal: Maintains hematologic stability  7/27/2025 0755 by lOya Rodriguez RN  Outcome: Progressing  Flowsheets (Taken 7/27/2025 0755)  Maintains hematologic stability: Assess for signs and symptoms of bleeding or hemorrhage     Problem: Coping  Goal: Pt/Family able to verbalize concerns and demonstrate effective coping strategies  Description: INTERVENTIONS:  1. Assist patient/family to identify coping skills, available support systems and cultural and spiritual values  2. Provide emotional support, including active listening and acknowledgement of concerns of patient and caregivers  3. Reduce environmental stimuli, as able  4. Instruct patient/family in relaxation techniques, as appropriate  5. Assess for spiritual pain/suffering and initiate Spiritual Care, Psychosocial

## 2025-07-27 NOTE — PROGRESS NOTES
Patient is disoriented x 4. Vital, assessment and daily care given. Education and care plan updated. Medication given as per order. Fall precaution initiated, call light within reach, bed in low position and wheels locked. Patient moves as walker x 1, room air and video monitoring continue. Addressed current patient's need. Continue to monitor.       Electronically signed by Kasandra Paez RN on 7/26/2025 at 11:49 PM

## 2025-07-27 NOTE — PLAN OF CARE
Problem: Discharge Planning  Goal: Discharge to home or other facility with appropriate resources  7/27/2025 0041 by Anna Ortiz RN  Outcome: Progressing  7/26/2025 2008 by Kasandra Paez RN  Outcome: Progressing  Flowsheets (Taken 7/26/2025 2008)  Discharge to home or other facility with appropriate resources:   Identify barriers to discharge with patient and caregiver   Arrange for needed discharge resources and transportation as appropriate   Identify discharge learning needs (meds, wound care, etc)   Arrange for interpreters to assist at discharge as needed   Refer to discharge planning if patient needs post-hospital services based on physician order or complex needs related to functional status, cognitive ability or social support system  7/26/2025 1555 by Olya Rodriguez RN  Outcome: Progressing  Flowsheets (Taken 7/26/2025 1555)  Discharge to home or other facility with appropriate resources:   Identify barriers to discharge with patient and caregiver   Identify discharge learning needs (meds, wound care, etc)   Refer to discharge planning if patient needs post-hospital services based on physician order or complex needs related to functional status, cognitive ability or social support system   Arrange for needed discharge resources and transportation as appropriate     Problem: Safety - Adult  Goal: Free from fall injury  7/27/2025 0041 by Anna Ortiz RN  Outcome: Progressing  7/26/2025 2008 by Kasandra Paez RN  Outcome: Progressing  Flowsheets (Taken 7/26/2025 2008)  Free From Fall Injury: Instruct family/caregiver on patient safety  7/26/2025 1555 by Olya Rodriguez RN  Outcome: Progressing  Flowsheets (Taken 7/26/2025 1555)  Free From Fall Injury: Instruct family/caregiver on patient safety     Problem: Pain  Goal: Verbalizes/displays adequate comfort level or baseline comfort level  7/27/2025 0041 by Anna Ortiz RN  Outcome: Progressing  7/26/2025 2008 by Kasandra Paez RN  Outcome:

## 2025-07-27 NOTE — PLAN OF CARE
Problem: Discharge Planning  Goal: Discharge to home or other facility with appropriate resources  7/26/2025 2008 by Kasandra Paez RN  Outcome: Progressing  Flowsheets (Taken 7/26/2025 2008)  Discharge to home or other facility with appropriate resources:   Identify barriers to discharge with patient and caregiver   Arrange for needed discharge resources and transportation as appropriate   Identify discharge learning needs (meds, wound care, etc)   Arrange for interpreters to assist at discharge as needed   Refer to discharge planning if patient needs post-hospital services based on physician order or complex needs related to functional status, cognitive ability or social support system     Problem: Safety - Adult  Goal: Free from fall injury  7/26/2025 2008 by Kasandra Paez RN  Outcome: Progressing  Flowsheets (Taken 7/26/2025 2008)  Free From Fall Injury: Instruct family/caregiver on patient safety     Problem: Pain  Goal: Verbalizes/displays adequate comfort level or baseline comfort level  7/26/2025 2008 by Kasandra Paez RN  Outcome: Progressing  Flowsheets (Taken 7/26/2025 2008)  Verbalizes/displays adequate comfort level or baseline comfort level:   Encourage patient to monitor pain and request assistance   Assess pain using appropriate pain scale   Administer analgesics based on type and severity of pain and evaluate response   Implement non-pharmacological measures as appropriate and evaluate response   Notify Licensed Independent Practitioner if interventions unsuccessful or patient reports new pain   Consider cultural and social influences on pain and pain management     Problem: ABCDS Injury Assessment  Goal: Absence of physical injury  7/26/2025 2008 by Kasandra Paez, RN  Outcome: Progressing  Flowsheets (Taken 7/26/2025 2008)  Absence of Physical Injury: Implement safety measures based on patient assessment     Problem: Skin/Tissue Integrity  Goal: Skin integrity remains intact  Description: 1.

## 2025-07-28 LAB
ANION GAP SERPL CALCULATED.3IONS-SCNC: 11 MMOL/L (ref 3–16)
BASOPHILS # BLD: 0.1 K/UL (ref 0–0.2)
BASOPHILS NFR BLD: 0.8 %
BUN SERPL-MCNC: 29 MG/DL (ref 7–20)
CALCIUM SERPL-MCNC: 10.3 MG/DL (ref 8.3–10.6)
CHLORIDE SERPL-SCNC: 112 MMOL/L (ref 99–110)
CO2 SERPL-SCNC: 22 MMOL/L (ref 21–32)
CREAT SERPL-MCNC: 0.9 MG/DL (ref 0.8–1.3)
DEPRECATED RDW RBC AUTO: 15.2 % (ref 12.4–15.4)
EOSINOPHIL # BLD: 0.4 K/UL (ref 0–0.6)
EOSINOPHIL NFR BLD: 4.9 %
GFR SERPLBLD CREATININE-BSD FMLA CKD-EPI: 84 ML/MIN/{1.73_M2}
GLUCOSE SERPL-MCNC: 111 MG/DL (ref 70–99)
HCT VFR BLD AUTO: 34.3 % (ref 40.5–52.5)
HGB BLD-MCNC: 11.5 G/DL (ref 13.5–17.5)
LYMPHOCYTES # BLD: 2.3 K/UL (ref 1–5.1)
LYMPHOCYTES NFR BLD: 32.2 %
MCH RBC QN AUTO: 29 PG (ref 26–34)
MCHC RBC AUTO-ENTMCNC: 33.4 G/DL (ref 31–36)
MCV RBC AUTO: 86.8 FL (ref 80–100)
MONOCYTES # BLD: 0.6 K/UL (ref 0–1.3)
MONOCYTES NFR BLD: 8.3 %
NEUTROPHILS # BLD: 3.8 K/UL (ref 1.7–7.7)
NEUTROPHILS NFR BLD: 53.8 %
PLATELET # BLD AUTO: 218 K/UL (ref 135–450)
PMV BLD AUTO: 8.6 FL (ref 5–10.5)
POTASSIUM SERPL-SCNC: 3.8 MMOL/L (ref 3.5–5.1)
RBC # BLD AUTO: 3.95 M/UL (ref 4.2–5.9)
SODIUM SERPL-SCNC: 145 MMOL/L (ref 136–145)
WBC # BLD AUTO: 7.1 K/UL (ref 4–11)

## 2025-07-28 PROCEDURE — 99232 SBSQ HOSP IP/OBS MODERATE 35: CPT | Performed by: INTERNAL MEDICINE

## 2025-07-28 PROCEDURE — 94760 N-INVAS EAR/PLS OXIMETRY 1: CPT

## 2025-07-28 PROCEDURE — 36415 COLL VENOUS BLD VENIPUNCTURE: CPT

## 2025-07-28 PROCEDURE — 97530 THERAPEUTIC ACTIVITIES: CPT

## 2025-07-28 PROCEDURE — 85025 COMPLETE CBC W/AUTO DIFF WBC: CPT

## 2025-07-28 PROCEDURE — 1200000000 HC SEMI PRIVATE

## 2025-07-28 PROCEDURE — 80048 BASIC METABOLIC PNL TOTAL CA: CPT

## 2025-07-28 PROCEDURE — 6370000000 HC RX 637 (ALT 250 FOR IP): Performed by: INTERNAL MEDICINE

## 2025-07-28 PROCEDURE — 6370000000 HC RX 637 (ALT 250 FOR IP): Performed by: STUDENT IN AN ORGANIZED HEALTH CARE EDUCATION/TRAINING PROGRAM

## 2025-07-28 PROCEDURE — 6360000002 HC RX W HCPCS: Performed by: INTERNAL MEDICINE

## 2025-07-28 PROCEDURE — 97535 SELF CARE MNGMENT TRAINING: CPT

## 2025-07-28 RX ADMIN — DIVALPROEX SODIUM 125 MG: 125 CAPSULE, COATED PELLETS ORAL at 20:37

## 2025-07-28 RX ADMIN — DIVALPROEX SODIUM 125 MG: 125 CAPSULE, COATED PELLETS ORAL at 10:24

## 2025-07-28 RX ADMIN — Medication 19.5 MG: at 20:36

## 2025-07-28 RX ADMIN — QUETIAPINE FUMARATE 12.5 MG: 25 TABLET ORAL at 15:46

## 2025-07-28 RX ADMIN — CITALOPRAM HYDROBROMIDE 10 MG: 10 TABLET ORAL at 10:24

## 2025-07-28 RX ADMIN — DIVALPROEX SODIUM 125 MG: 125 CAPSULE, COATED PELLETS ORAL at 15:46

## 2025-07-28 RX ADMIN — QUETIAPINE FUMARATE 12.5 MG: 25 TABLET ORAL at 20:38

## 2025-07-28 RX ADMIN — ENOXAPARIN SODIUM 40 MG: 100 INJECTION SUBCUTANEOUS at 20:43

## 2025-07-28 NOTE — PLAN OF CARE
Problem: Discharge Planning  Goal: Discharge to home or other facility with appropriate resources  7/28/2025 1951 by Marissa Harper RN  Outcome: Progressing  Flowsheets (Taken 7/28/2025 1351 by Linda Box RN)  Discharge to home or other facility with appropriate resources: Identify barriers to discharge with patient and caregiver  7/28/2025 1351 by Linda Box RN  Outcome: Progressing  Flowsheets (Taken 7/28/2025 1351)  Discharge to home or other facility with appropriate resources: Identify barriers to discharge with patient and caregiver     Problem: Safety - Adult  Goal: Free from fall injury  7/28/2025 1951 by Marissa Harper RN  Outcome: Progressing  Flowsheets (Taken 7/28/2025 1351 by Box, Linda, RN)  Free From Fall Injury: Instruct family/caregiver on patient safety  7/28/2025 1351 by Linda Box RN  Outcome: Progressing  Flowsheets (Taken 7/28/2025 1351)  Free From Fall Injury: Instruct family/caregiver on patient safety     Problem: Pain  Goal: Verbalizes/displays adequate comfort level or baseline comfort level  7/28/2025 1951 by Marissa Harper RN  Outcome: Progressing  Flowsheets (Taken 7/28/2025 1351 by Box, Linda, RN)  Verbalizes/displays adequate comfort level or baseline comfort level: Encourage patient to monitor pain and request assistance  7/28/2025 1351 by Linda Box RN  Outcome: Progressing  Flowsheets (Taken 7/28/2025 1351)  Verbalizes/displays adequate comfort level or baseline comfort level: Encourage patient to monitor pain and request assistance     Problem: ABCDS Injury Assessment  Goal: Absence of physical injury  7/28/2025 1951 by Marissa Harper RN  Outcome: Progressing  Flowsheets (Taken 7/28/2025 1351 by Linda Box, RN)  Absence of Physical Injury: Implement safety measures based on patient assessment  7/28/2025 1351 by Linda Box RN  Outcome: Progressing  Flowsheets (Taken 7/28/2025 1351)  Absence of  impaired vision or hearing, underlying metabolic abnormalities, dehydration, psychiatric diagnoses, notify LIP     Problem: Behavior  Goal: Pt/Family maintain appropriate behavior and adhere to behavioral management agreement, if implemented  Description: INTERVENTIONS:  1. Assess patient/family's coping skills and  non-compliant behavior (including use of illegal substances)  2. Notify security of behavior or suspected illegal substances which indicate the need for search of the family and/or belongings  3. Encourage verbalization of thoughts and concerns in a socially appropriate manner  4. Utilize positive, consistent limit setting strategies supporting safety of patient, staff and others  5. Encourage participation in the decision making process about the behavioral management agreement  6. If a visitor's behavior poses a threat to safety call refer to organization policy.  7. Initiate consult with , Psychosocial CNS, Spiritual Care as appropriate  7/28/2025 1951 by Marissa Harper, RN  Outcome: Progressing  Flowsheets (Taken 7/28/2025 1351 by Linda Box, RN)  Patient/family maintains appropriate behavior and adheres to behavioral management agreement, if implemented: Assess patient/family’s coping skills and  non-compliant behavior (including use of illegal substances)  7/28/2025 1351 by Linda Box, RN  Outcome: Progressing  Flowsheets (Taken 7/28/2025 1351)  Patient/family maintains appropriate behavior and adheres to behavioral management agreement, if implemented: Assess patient/family’s coping skills and  non-compliant behavior (including use of illegal substances)

## 2025-07-28 NOTE — PROGRESS NOTES
[facility] - Physical Therapy   Phone: [phone]    Physical Therapy  Facility/Department:[address] ORTHOPEDICS    [] Initial Evaluation            [x] Daily Treatment Note         [] Discharge Summary      Patient: Aki Payne   : 1940   MRN: [id number]   Date of Service:  2025  Staff Mobility Recommendation: strict hands on assist of 2 with gait belt   or   stedy x 2  (second assist for safety)    AM-PAC score: 14  Discharge Recommendations: SNF     Aki Payne scored a 14 on the AM-PAC short mobility form. Current research shows that an AM-PAC score of 17 or less is typically not associated with a discharge to the patient's home setting. Based on the patient's AM-PAC score and their current functional mobility deficits, it is recommended that the patient have up to 5 sessions per week of Physical Therapy at d/c to increase the patient's independence.  Please see assessment section for further patient specific details.    If patient discharges prior to next session this note will serve as a discharge summary.  Please see below for the latest assessment towards goals.       Admitting Diagnosis: Complicated UTI (urinary tract infection)  Ordering Physician: J Carlos   Current Admission Summary: here due to AMS, UTI recently     Past Medical History:  has a past medical history of Bladder cancer (HCC), Blood in urine, CAD (coronary artery disease), Colon polyps, Dementia (HCC), DNR (do not resuscitate), and Hyperlipidemia.  Past Surgical History:  has a past surgical history that includes Bladder removal (); Colonoscopy (2010); other surgical history (2013, 2014); Colonoscopy (10/05/2016); Tibia fracture surgery (Right, ); and Cystoscopy (N/A, 10/4/2022).    Assessment  Activity Tolerance: Fair  Impairments Requiring Therapeutic Intervention: decreased functional mobility, decreased ADL status, decreased safety awareness, decreased cognition, decreased endurance,

## 2025-07-28 NOTE — PROGRESS NOTES
Patient is in bed, quiet, with eyes closed. Respirations are even and unlabored. Bed is locked in lowest position with bed alarm on. Bedside table and call light are within reach.    This RN spoke with patient family member Shaina earlier in shift, updates provided, all questions answered.

## 2025-07-28 NOTE — PROGRESS NOTES
Discussed with the patient's wife.  She would like to take him home and consider hospice care.  Social work reconsulted to verify which hospice organization they would like to speak to.  The patient's wife will call her granddaughter and they will clarify which agency they prefer.    Once they meet with hospice they will determine the best course of action moving forward.  I would favor discharge with home hospice to prevent readmissions as I anticipate that decline is inevitable.  Discharge will be arranged once plan for hospice is confirmed.  Electronically signed by ROLANDO WOLF MD on 7/28/2025 at 1:06 PM

## 2025-07-28 NOTE — PLAN OF CARE
Problem: Discharge Planning  Goal: Discharge to home or other facility with appropriate resources  Outcome: Progressing  Flowsheets (Taken 7/28/2025 1351)  Discharge to home or other facility with appropriate resources: Identify barriers to discharge with patient and caregiver     Problem: Safety - Adult  Goal: Free from fall injury  Outcome: Progressing  Flowsheets (Taken 7/28/2025 1351)  Free From Fall Injury: Instruct family/caregiver on patient safety     Problem: Pain  Goal: Verbalizes/displays adequate comfort level or baseline comfort level  Outcome: Progressing  Flowsheets (Taken 7/28/2025 1351)  Verbalizes/displays adequate comfort level or baseline comfort level: Encourage patient to monitor pain and request assistance     Problem: ABCDS Injury Assessment  Goal: Absence of physical injury  Outcome: Progressing  Flowsheets (Taken 7/28/2025 1351)  Absence of Physical Injury: Implement safety measures based on patient assessment     Problem: Skin/Tissue Integrity  Goal: Skin integrity remains intact  Description: 1.  Monitor for areas of redness and/or skin breakdown  2.  Assess vascular access sites hourly  3.  Every 4-6 hours minimum:  Change oxygen saturation probe site  4.  Every 4-6 hours:  If on nasal continuous positive airway pressure, respiratory therapy assess nares and determine need for appliance change or resting period  Outcome: Progressing  Flowsheets (Taken 7/28/2025 1351)  Skin Integrity Remains Intact: Monitor for areas of redness and/or skin breakdown     Problem: Nutrition Deficit:  Goal: Optimize nutritional status  Outcome: Progressing  Flowsheets (Taken 7/28/2025 1351)  Nutrient intake appropriate for improving, restoring, or maintaining nutritional needs: Assess nutritional status and recommend course of action     Problem: Safety - Medical Restraint  Goal: Remains free of injury from restraints (Restraint for Interference with Medical Device)  Description: INTERVENTIONS:  1.  Determine that other, less restrictive measures have been tried or would not be effective before applying the restraint  2. Evaluate the patient's condition at the time of restraint application  3. Inform patient/family regarding the reason for restraint  4. Q2H: Monitor safety, psychosocial status, comfort, nutrition and hydration  Outcome: Progressing  Flowsheets (Taken 7/28/2025 1351)  Remains free of injury from restraints (restraint for interference with medical device): Determine that other, less restrictive measures have been tried or would not be effective before applying the restraint     Problem: Neurosensory - Adult  Goal: Achieves stable or improved neurological status  Outcome: Progressing  Flowsheets (Taken 7/28/2025 1351)  Achieves stable or improved neurological status: Assess for and report changes in neurological status  Goal: Achieves maximal functionality and self care  Outcome: Progressing  Flowsheets (Taken 7/28/2025 1351)  Achieves maximal functionality and self care: Monitor swallowing and airway patency with patient fatigue and changes in neurological status     Problem: Respiratory - Adult  Goal: Achieves optimal ventilation and oxygenation  Outcome: Progressing  Flowsheets (Taken 7/28/2025 1351)  Achieves optimal ventilation and oxygenation: Assess for changes in respiratory status     Problem: Cardiovascular - Adult  Goal: Maintains optimal cardiac output and hemodynamic stability  Outcome: Progressing  Flowsheets (Taken 7/28/2025 1351)  Maintains optimal cardiac output and hemodynamic stability: Monitor blood pressure and heart rate  Goal: Absence of cardiac dysrhythmias or at baseline  Outcome: Progressing  Flowsheets (Taken 7/28/2025 1351)  Absence of cardiac dysrhythmias or at baseline: Monitor cardiac rate and rhythm     Problem: Skin/Tissue Integrity - Adult  Goal: Skin integrity remains intact  Description: 1.  Monitor for areas of redness and/or skin breakdown  2.  Assess vascular

## 2025-07-28 NOTE — PROGRESS NOTES
Patient worked with therapy and is now up in chair, awake and alert, calm.     Patients wife at bedside and spoke with this RN regarding discharge planning for patient, states she may want to take him home and would like to hear from Dr. Whitman.     Perfect serve sent to Dr. Whitman and states he will be calling wife soon.

## 2025-07-28 NOTE — PLAN OF CARE
Problem: Discharge Planning  Goal: Discharge to home or other facility with appropriate resources  Outcome: Progressing  Flowsheets (Taken 7/26/2025 2008 by Kasandra Paez, RN)  Discharge to home or other facility with appropriate resources:   Identify barriers to discharge with patient and caregiver   Arrange for needed discharge resources and transportation as appropriate   Identify discharge learning needs (meds, wound care, etc)   Arrange for interpreters to assist at discharge as needed   Refer to discharge planning if patient needs post-hospital services based on physician order or complex needs related to functional status, cognitive ability or social support system     Problem: Safety - Adult  Goal: Free from fall injury  Outcome: Progressing  Flowsheets (Taken 7/26/2025 2008 by Kasandra Paez, RN)  Free From Fall Injury: Instruct family/caregiver on patient safety     Problem: Pain  Goal: Verbalizes/displays adequate comfort level or baseline comfort level  Outcome: Progressing  Flowsheets (Taken 7/26/2025 2008 by Kasandra Paez, RN)  Verbalizes/displays adequate comfort level or baseline comfort level:   Encourage patient to monitor pain and request assistance   Assess pain using appropriate pain scale   Administer analgesics based on type and severity of pain and evaluate response   Implement non-pharmacological measures as appropriate and evaluate response   Notify Licensed Independent Practitioner if interventions unsuccessful or patient reports new pain   Consider cultural and social influences on pain and pain management     Problem: ABCDS Injury Assessment  Goal: Absence of physical injury  Outcome: Progressing  Flowsheets (Taken 7/26/2025 2008 by Kasandra Paez, RN)  Absence of Physical Injury: Implement safety measures based on patient assessment     Problem: Skin/Tissue Integrity  Goal: Skin integrity remains intact  Description: 1.  Monitor for areas of redness and/or skin breakdown  2.  Assess  and demonstrate effective coping strategies  Description: INTERVENTIONS:  1. Assist patient/family to identify coping skills, available support systems and cultural and spiritual values  2. Provide emotional support, including active listening and acknowledgement of concerns of patient and caregivers  3. Reduce environmental stimuli, as able  4. Instruct patient/family in relaxation techniques, as appropriate  5. Assess for spiritual pain/suffering and initiate Spiritual Care, Psychosocial Clinical Specialist consults as needed  Outcome: Progressing  Flowsheets (Taken 7/27/2025 0755 by Olya Rordiguez, RN)  Patient/family able to verbalize anxieties, fears, and concerns, and demonstrate effective coping: Assist patient/family to identify coping skills, available support systems and cultural and spiritual values     Problem: Confusion  Goal: Confusion, delirium, dementia, or psychosis is improved or at baseline  Description: INTERVENTIONS:  1. Assess for possible contributors to thought disturbance, including medications, impaired vision or hearing, underlying metabolic abnormalities, dehydration, psychiatric diagnoses, and notify attending LIP  2. [place] high risk fall precautions, as indicated  3. Provide frequent short contacts to provide reality reorientation, refocusing and direction  4. Decrease environmental stimuli, including noise as appropriate  5. Monitor and intervene to maintain adequate nutrition, hydration, elimination, sleep and activity  6. If unable to ensure safety without constant attention obtain sitter and review sitter guidelines with assigned personnel  7. Initiate Psychosocial CNS and Spiritual Care consult, as indicated  Outcome: Progressing  Flowsheets (Taken 7/27/2025 0755 by Olya Rodriguez, RN)  Effect of thought disturbance (confusion, delirium, dementia, or psychosis) are managed with adequate functional status: Assess for contributors to thought disturbance, including medications,

## 2025-07-28 NOTE — PROGRESS NOTES
This RN spoke with  Anya over the phone who states she should be in to talk with wife about discharge planning this afternoon sometime.

## 2025-07-28 NOTE — PROGRESS NOTES
[facility] Note      Chief Complaint: Patient sleeping this morning    Subjective/Interval History:    This morning the patient is sleeping.  Sitter remains at the bedside.  Nurses at the bedside, report he had a very uneventful night, was very quiet.  No new problems noted.  Patient was ambulated in the alcocer over the weekend at times.  No other new problems or concerns noted.  Patient not currently in restraints.    Review of systems unobtainable.    PMH, PSH, FH/SH reviewed and unchanged as documented in the H&P dated 25    Medication list reviewed    Objective:    BP (!) 132/58   Pulse 79   Temp 97.3 °F (36.3 °C) (Axillary)   Resp 17   Ht 1.753 m (5' 9.02\")   Wt 75.9 kg (167 lb 5.3 oz)   SpO2 96%   BMI 24.70 kg/m²   Temp  Av.3 °F (36.3 °C)  Min: 97.3 °F (36.3 °C)  Max: 97.3 °F (36.3 °C)    Patient sleeping comfortably in bed, wakes to voice.  Exam is unchanged.  RRR  Chest-respirations easy.  Chest is clear to auscultation.  Abd- Soft, NTND   Ext- no edema    The Following Labs Were Reviewed Today:     Recent Results (from the past 24 hours)   CBC with Auto Differential    Collection Time: 25  6:05 AM   Result Value Ref Range    WBC 7.1 4.0 - 11.0 K/uL    RBC 3.95 (L) 4.20 - 5.90 M/uL    Hemoglobin 11.5 (L) 13.5 - 17.5 g/dL    Hematocrit 34.3 (L) 40.5 - 52.5 %    MCV 86.8 80.0 - 100.0 fL    MCH 29.0 26.0 - 34.0 pg    MCHC 33.4 31.0 - 36.0 g/dL    RDW 15.2 12.4 - 15.4 %    Platelets 218 135 - 450 K/uL    MPV 8.6 5.0 - 10.5 fL    Neutrophils % 53.8 %    Lymphocytes % 32.2 %    Monocytes % 8.3 %    Eosinophils % 4.9 %    Basophils % 0.8 %    Neutrophils Absolute 3.8 1.7 - 7.7 K/uL    Lymphocytes Absolute 2.3 1.0 - 5.1 K/uL    Monocytes Absolute 0.6 0.0 - 1.3 K/uL    Eosinophils Absolute 0.4 0.0 - 0.6 K/uL    Basophils Absolute 0.1 0.0 - 0.2 K/uL   Basic Metabolic Panel    Collection Time: 25  6:05 AM   Result Value Ref Range    Sodium 145 136 - 145 mmol/L    Potassium 3.8

## 2025-07-28 NOTE — CARE COORDINATION
Discharge Planning:     Met with the spouse to discuss SW consult for hospice. Choice is [facility] for informational purposes. Will need called to them in AM. Spouse stated she is fine with speaking to them after discharge.     Spouse stated she wants to take him home and does not want him to discharge to a SNF. Spoke to [facility]. They declined pt.     Active with [facility] for Aide services (2 days per week for 2/12 hrs per day for bathing). Spouse would like to increase these hours or spread them out.     Spouse states their son lives with them and is there during the weekend and travels on the weekends. States she has other family members to help during the week.     Will need to contact [facility] to add on PT/OT/skilled nursing. Contact the [facility] to increase current hours.     Family will provide transportation. May need wheelchair.     Anya BURTON RN  Case Management  [phone]    Electronically signed by Anya Owens RN on 7/28/2025 at 6:04 PM

## 2025-07-28 NOTE — PROGRESS NOTES
[facility] - Occupational Therapy   Phone: [phone]    Occupational Therapy  Facility/Department:[address] ORTHOPEDICS    [] Initial Evaluation            [x] Daily Treatment Note         [] Discharge Summary      Patient: Aki Payne   : 1940   MRN: [id number]   Date of Service:  2025    Staff Mobility Recommendation: hands on assist of 2 with gait belt    AM-PAC Score: 10/24  Discharge Recommendations: SNF vs  assist of 2    Admitting Diagnosis:  Complicated UTI (urinary tract infection)  Ordering Physician: Lionel  Current Admission Summary: Per H&P: Aki Payne is a 85 y.o. male with a history of bladder cx, CAD, dementia, and HLD who presents to the hospital for abnormal labs and somnolence. Pt has had progressive dementia over the last year. Getting severe. Pt having issues with agitation at times and not sleeping at night. Pt has been following w/PCP for this. Recently, about 2 weeks ago, his meds were adjusted. Pt zyprexa was changed to seroquel. He has been on the same doses of depakote and celexa for some time. Ever since this change pt has been more somnolent.     Past Medical History:  has a past medical history of Bladder cancer (HCC), Blood in urine, CAD (coronary artery disease), Colon polyps, Dementia (HCC), DNR (do not resuscitate), and Hyperlipidemia.  Past Surgical History:  has a past surgical history that includes Bladder removal (); Colonoscopy (2010); other surgical history (2013, 2014); Colonoscopy (10/05/2016); Tibia fracture surgery (Right, ); and Cystoscopy (N/A, 10/4/2022).    Assessment  Activity Tolerance: Fair  Impairments Requiring Therapeutic Intervention: decreased functional mobility, decreased ADL status, decreased ROM, decreased strength, decreased safety awareness, decreased cognition, decreased endurance, decreased balance  Prognosis: guarded    Clinical Assessment: At baseline, pt lives at home with his wife where he requires

## 2025-07-28 NOTE — PROGRESS NOTES
Patient resting in bed with eyes closed upon assessment, awake and alert, able to take medications in applesauce and a drink a water without difficulty.    Patient repositioned in bed, urostomy emptied. Patient was able to eat some breakfast when fed. Sitter at bedside.

## 2025-07-29 VITALS
HEART RATE: 59 BPM | DIASTOLIC BLOOD PRESSURE: 70 MMHG | HEIGHT: 69 IN | TEMPERATURE: 97.5 F | WEIGHT: 167.33 LBS | RESPIRATION RATE: 16 BRPM | SYSTOLIC BLOOD PRESSURE: 123 MMHG | OXYGEN SATURATION: 100 % | BODY MASS INDEX: 24.78 KG/M2

## 2025-07-29 LAB
AMORPH SED URNS QL MICRO: ABNORMAL /HPF
ANION GAP SERPL CALCULATED.3IONS-SCNC: 10 MMOL/L (ref 3–16)
BASOPHILS # BLD: 0.1 K/UL (ref 0–0.2)
BASOPHILS NFR BLD: 0.8 %
BILIRUB UR QL STRIP.AUTO: NEGATIVE
BUN SERPL-MCNC: 29 MG/DL (ref 7–20)
CALCIUM SERPL-MCNC: 10.5 MG/DL (ref 8.3–10.6)
CHARACTER UR: ABNORMAL
CHLORIDE SERPL-SCNC: 111 MMOL/L (ref 99–110)
CLARITY UR: ABNORMAL
CO2 SERPL-SCNC: 23 MMOL/L (ref 21–32)
COLOR UR: YELLOW
CREAT SERPL-MCNC: 1 MG/DL (ref 0.8–1.3)
CRYSTALS URNS MICRO: ABNORMAL /HPF
DEPRECATED RDW RBC AUTO: 15.2 % (ref 12.4–15.4)
EOSINOPHIL # BLD: 0.3 K/UL (ref 0–0.6)
EOSINOPHIL NFR BLD: 4.6 %
EPI CELLS #/AREA URNS HPF: ABNORMAL /HPF (ref 0–5)
GFR SERPLBLD CREATININE-BSD FMLA CKD-EPI: 74 ML/MIN/{1.73_M2}
GLUCOSE SERPL-MCNC: 107 MG/DL (ref 70–99)
GLUCOSE UR STRIP.AUTO-MCNC: NEGATIVE MG/DL
HCT VFR BLD AUTO: 32.9 % (ref 40.5–52.5)
HGB BLD-MCNC: 11.5 G/DL (ref 13.5–17.5)
HGB UR QL STRIP.AUTO: NEGATIVE
KETONES UR STRIP.AUTO-MCNC: NEGATIVE MG/DL
LEUKOCYTE ESTERASE UR QL STRIP.AUTO: ABNORMAL
LYMPHOCYTES # BLD: 2.1 K/UL (ref 1–5.1)
LYMPHOCYTES NFR BLD: 29.8 %
MCH RBC QN AUTO: 29.9 PG (ref 26–34)
MCHC RBC AUTO-ENTMCNC: 34.8 G/DL (ref 31–36)
MCV RBC AUTO: 86.1 FL (ref 80–100)
MONOCYTES # BLD: 0.5 K/UL (ref 0–1.3)
MONOCYTES NFR BLD: 7.6 %
NEUTROPHILS # BLD: 4 K/UL (ref 1.7–7.7)
NEUTROPHILS NFR BLD: 57.2 %
NITRITE UR QL STRIP.AUTO: NEGATIVE
PH UR STRIP.AUTO: 8.5 [PH] (ref 5–8)
PLATELET # BLD AUTO: 202 K/UL (ref 135–450)
PMV BLD AUTO: 8.4 FL (ref 5–10.5)
POTASSIUM SERPL-SCNC: 3.6 MMOL/L (ref 3.5–5.1)
PROT UR STRIP.AUTO-MCNC: 100 MG/DL
RBC # BLD AUTO: 3.83 M/UL (ref 4.2–5.9)
RBC #/AREA URNS HPF: ABNORMAL /HPF (ref 0–4)
SODIUM SERPL-SCNC: 144 MMOL/L (ref 136–145)
SP GR UR STRIP.AUTO: 1.01 (ref 1–1.03)
UA DIPSTICK W REFLEX MICRO PNL UR: YES
URN SPEC COLLECT METH UR: ABNORMAL
UROBILINOGEN UR STRIP-ACNC: 0.2 E.U./DL
WBC # BLD AUTO: 6.9 K/UL (ref 4–11)
WBC #/AREA URNS HPF: ABNORMAL /HPF (ref 0–5)
YEAST URNS QL MICRO: PRESENT /HPF

## 2025-07-29 PROCEDURE — 87086 URINE CULTURE/COLONY COUNT: CPT

## 2025-07-29 PROCEDURE — 81001 URINALYSIS AUTO W/SCOPE: CPT

## 2025-07-29 PROCEDURE — 2500000003 HC RX 250 WO HCPCS: Performed by: INTERNAL MEDICINE

## 2025-07-29 PROCEDURE — 6370000000 HC RX 637 (ALT 250 FOR IP): Performed by: INTERNAL MEDICINE

## 2025-07-29 PROCEDURE — 80048 BASIC METABOLIC PNL TOTAL CA: CPT

## 2025-07-29 PROCEDURE — 85025 COMPLETE CBC W/AUTO DIFF WBC: CPT

## 2025-07-29 PROCEDURE — 97530 THERAPEUTIC ACTIVITIES: CPT

## 2025-07-29 PROCEDURE — 99239 HOSP IP/OBS DSCHRG MGMT >30: CPT | Performed by: INTERNAL MEDICINE

## 2025-07-29 PROCEDURE — 36415 COLL VENOUS BLD VENIPUNCTURE: CPT

## 2025-07-29 PROCEDURE — 94760 N-INVAS EAR/PLS OXIMETRY 1: CPT

## 2025-07-29 RX ORDER — QUETIAPINE FUMARATE 25 MG/1
12.5 TABLET, FILM COATED ORAL 2 TIMES DAILY
Qty: 60 TABLET | Refills: 3 | Status: SHIPPED | OUTPATIENT
Start: 2025-07-29

## 2025-07-29 RX ADMIN — CITALOPRAM HYDROBROMIDE 10 MG: 10 TABLET ORAL at 09:28

## 2025-07-29 RX ADMIN — SODIUM CHLORIDE, PRESERVATIVE FREE 10 ML: 5 INJECTION INTRAVENOUS at 09:28

## 2025-07-29 RX ADMIN — DIVALPROEX SODIUM 125 MG: 125 CAPSULE, COATED PELLETS ORAL at 09:28

## 2025-07-29 ASSESSMENT — PAIN SCALES - GENERAL: PAINLEVEL_OUTOF10: 0

## 2025-07-29 NOTE — PROGRESS NOTES
Patient was sleeping in bed, A&O X1. VSS. Right arm PIV Line was flushed, dressing was clean, dry an intact, No complaint at this time. All other AM medications taken Crushed with applesauce/pudding without complaint (see eMAR).  Patient tolerating PO intake and appetite adequate. Standard safety precautions in place and call light within reach.

## 2025-07-29 NOTE — PROGRESS NOTES
Data- discharge order received, pt verbalized agreement to discharge, disposition to previous residence, no needs for HHC/DME.     Action- discharge instructions prepared and given to wife, pt's wife verbalized understanding. Medication information packet given r/t NEW and/or CHANGED prescriptions emphasizing name/purpose/side effects, pt's wife verbalized understanding. Discharge instruction summary: Diet- as tolerated, Activity- as tolerated, Primary Care Physician as follows: Zackary Whitman -[phone] f/u appointment after 1 week, Pt/ Family has had a total of 60 minutes.    Response- Pt belongings gathered, IV removed. Disposition is home (no HHC/DME needs), transported with private vehicle, taken to lobby via w/c w/ transporter, no complications.      Patient was drowsy and the wife states that it was his normal.

## 2025-07-29 NOTE — CARE COORDINATION
Discharge Planning:     Accepted by [facility] for PT/OT. They also provide his Aide thru the [facility].     Referral provided to [facility] for informational purposes only at this time. They will contact the patient's spouse.     Active with the [facility] for an Aide. Attempted to contact the [facility] to either increase hours or arrange for current hours to be spread out over more days. Personnel in a meeting. Will call back after hospital rounds.     Anya BURTON RN  Case Management  [phone]    Electronically signed by nAya Owens RN on 7/29/2025 at 9:36 AM

## 2025-07-29 NOTE — PROGRESS NOTES
[facility] Note      Chief Complaint: Patient alert but again not verbal    Subjective/Interval History:    This morning the patient is resting quietly.  Nursing reports very foul-smelling thick urine.  PCA at the bedside/sitter reporting the patient had some agitation last night but slept a good portion of the night.  No other new problems noted overnight.    Hospice has not yet met with the family yet.    Review of systems unobtainable.    PMH, PSH, FH/SH reviewed and unchanged as documented in the H&P dated 25    Medication list reviewed    Objective:    /70   Pulse 59   Temp 97.5 °F (36.4 °C) (Axillary)   Resp 16   Ht 1.753 m (5' 9.02\")   Wt 75.9 kg (167 lb 5.3 oz)   SpO2 100%   BMI 24.70 kg/m²   Temp  Av.9 °F (36.6 °C)  Min: 97.5 °F (36.4 °C)  Max: 98.2 °F (36.8 °C)    Patient sleeping comfortably in bed, wakes to voice.  Exam remains unchanged.  RRR  Chest-respirations easy.  Chest is clear to auscultation.  Abd- Soft, NTND   Ext- no edema    The Following Labs Were Reviewed Today:     Recent Results (from the past 24 hours)   CBC with Auto Differential    Collection Time: 25  4:58 AM   Result Value Ref Range    WBC 6.9 4.0 - 11.0 K/uL    RBC 3.83 (L) 4.20 - 5.90 M/uL    Hemoglobin 11.5 (L) 13.5 - 17.5 g/dL    Hematocrit 32.9 (L) 40.5 - 52.5 %    MCV 86.1 80.0 - 100.0 fL    MCH 29.9 26.0 - 34.0 pg    MCHC 34.8 31.0 - 36.0 g/dL    RDW 15.2 12.4 - 15.4 %    Platelets 202 135 - 450 K/uL    MPV 8.4 5.0 - 10.5 fL    Neutrophils % 57.2 %    Lymphocytes % 29.8 %    Monocytes % 7.6 %    Eosinophils % 4.6 %    Basophils % 0.8 %    Neutrophils Absolute 4.0 1.7 - 7.7 K/uL    Lymphocytes Absolute 2.1 1.0 - 5.1 K/uL    Monocytes Absolute 0.5 0.0 - 1.3 K/uL    Eosinophils Absolute 0.3 0.0 - 0.6 K/uL    Basophils Absolute 0.1 0.0 - 0.2 K/uL   Basic Metabolic Panel    Collection Time: 25  4:58 AM   Result Value Ref Range    Sodium 144 136 - 145 mmol/L    Potassium 3.6 3.5 -

## 2025-07-29 NOTE — PLAN OF CARE
Problem: Discharge Planning  Goal: Discharge to home or other facility with appropriate resources  Outcome: Progressing  Flowsheets (Taken 7/29/2025 0953)  Discharge to home or other facility with appropriate resources:   Identify barriers to discharge with patient and caregiver   Identify discharge learning needs (meds, wound care, etc)   Arrange for needed discharge resources and transportation as appropriate   Refer to discharge planning if patient needs post-hospital services based on physician order or complex needs related to functional status, cognitive ability or social support system     Problem: Safety - Adult  Goal: Free from fall injury  Outcome: Progressing  Flowsheets (Taken 7/29/2025 0953)  Free From Fall Injury: Instruct family/caregiver on patient safety     Problem: Pain  Goal: Verbalizes/displays adequate comfort level or baseline comfort level  Outcome: Progressing  Flowsheets (Taken 7/29/2025 0953)  Verbalizes/displays adequate comfort level or baseline comfort level:   Administer analgesics based on type and severity of pain and evaluate response   Assess pain using appropriate pain scale   Implement non-pharmacological measures as appropriate and evaluate response     Problem: ABCDS Injury Assessment  Goal: Absence of physical injury  Outcome: Progressing  Flowsheets (Taken 7/29/2025 0953)  Absence of Physical Injury: Implement safety measures based on patient assessment     Problem: Skin/Tissue Integrity  Goal: Skin integrity remains intact  Description: 1.  Monitor for areas of redness and/or skin breakdown  2.  Assess vascular access sites hourly  3.  Every 4-6 hours minimum:  Change oxygen saturation probe site  4.  Every 4-6 hours:  If on nasal continuous positive airway pressure, respiratory therapy assess nares and determine need for appliance change or resting period  Outcome: Progressing  Flowsheets (Taken 7/29/2025 0953)  Skin Integrity Remains Intact:   Monitor for areas of  redness and/or skin breakdown   Assess vascular access sites hourly   Turn and reposition as indicated   Assess need for specialty bed   Positioning devices   Pressure redistribution bed/mattress (bed type)   Monitor skin under medical devices     Problem: Nutrition Deficit:  Goal: Optimize nutritional status  Outcome: Progressing  Flowsheets (Taken 7/29/2025 0953)  Nutrient intake appropriate for improving, restoring, or maintaining nutritional needs:   Assess nutritional status and recommend course of action   Monitor oral intake, labs, and treatment plans   Order, calculate, and assess calorie counts as needed   Recommend appropriate diets, oral nutritional supplements, and vitamin/mineral supplements     Problem: Safety - Medical Restraint  Goal: Remains free of injury from restraints (Restraint for Interference with Medical Device)  Description: INTERVENTIONS:  1. Determine that other, less restrictive measures have been tried or would not be effective before applying the restraint  2. Evaluate the patient's condition at the time of restraint application  3. Inform patient/family regarding the reason for restraint  4. Q2H: Monitor safety, psychosocial status, comfort, nutrition and hydration  Outcome: Progressing  Flowsheets (Taken 7/29/2025 0953)  Remains free of injury from restraints (restraint for interference with medical device):   Determine that other, less restrictive measures have been tried or would not be effective before applying the restraint   Every 2 hours: Monitor safety, psychosocial status, comfort, nutrition and hydration     Problem: Neurosensory - Adult  Goal: Achieves stable or improved neurological status  Outcome: Progressing  Flowsheets (Taken 7/29/2025 0953)  Achieves stable or improved neurological status:   Assess for and report changes in neurological status   Maintain blood pressure and fluid volume within ordered parameters to optimize cerebral perfusion and minimize risk of

## 2025-07-29 NOTE — PROGRESS NOTES
Discussed with the patient's wife.  She would like to take the patient home and discuss with hospice at home if he begins to have trouble again.  [facility] they may offer as well.  Electronically signed by ROLANDO WOLF MD on 7/29/2025 at 9:44 AM

## 2025-07-29 NOTE — PROGRESS NOTES
Occupational Therapy    Aki Payne  [id number]  [id number]    Patients wife present in room upon arrival to room. Wife educated on use of gait belt for home use. Demonstration provided on proper placement, use and safe hand placement on gait belt in order to safely assist patient. Wife verbalized understanding. Please see last daily OT note for discharge status.     Ashia VALDES/L 1517    OTR was consulted with this patients treatment/intervention plan.   Therapy Time     Individual Co-treatment   Time In 1240    Time Out 1250    Minutes 10

## 2025-07-29 NOTE — CARE COORDINATION
Case Management Discharge Note          Date / Time of Note: 7/29/2025 12:48 PM                  Patient Name: Aki Payne   YOB: 1940  Diagnosis: Complicated UTI (urinary tract infection) [N39.0]  Altered mental status, unspecified altered mental status type [R41.82]   Date / Time: 7/19/2025 12:08 PM    Financial:  Payor: MEDICARE / Plan: MEDICARE PART A AND B / Product Type: *No Product type* /      Pharmacy:    [id number] - [place], [place] - [address] - P [phone] - F [phone]  [address] [address]  Phone: [phone] Fax: [phone]    [facility] - [place], [place] - [address] -  [phone] - F [phone]  [facility] [address]  Phone: [phone] Fax: [phone]    [id number] - [place], [place] - [address] - P [phone] - F [phone]  [address]  [facility] [address]  Phone: [phone] Fax: [phone]      Assistance purchasing medications?: Potential Assistance Purchasing Medications: No  Assistance provided by Case Management: None at this time    DISCHARGE Disposition: Home with Home Health Care    Home Care:  Home Care ordered at discharge: Yes  Home Care Agency: [facility]  Phone: [phone]  Fax: [phone]  Orders faxed: No    Home Oxygen and Respiratory Equipment:  Oxygen needed at discharge?: Not Indicated  Home Oxygen Company: Not Applicable  Portable tank available for discharge?: Not Indicated    Hospice Services:  Location: Not Applicable  Agency: [facility]  Phone: [phone]    Consents signed: No, meeting with spouse at their home on 7/30/25 at 10AM for informational purposes    Referrals made at DISCHARGE for outpatient continued care:  Not Applicable    Transportation:  Transportation PLAN for discharge: family   Mode of Transport: Private Car  Reason for medical

## 2025-07-29 NOTE — PROGRESS NOTES
Patient anxious, agitated and trying to get out of bed. PM meds given with hopes to calm patient. Tolerated well. Patient now resting in bed.

## 2025-07-30 ENCOUNTER — CARE COORDINATION (OUTPATIENT)
Dept: CARE COORDINATION | Age: 85
End: 2025-07-30

## 2025-07-30 NOTE — CARE COORDINATION
Care Transitions Note    Initial Call - Call within 2 business days of discharge: Yes    Patient Current Location:  Home: [address] [place] [id number]    Care Transition Nurse contacted the patient by telephone to perform post hospital discharge assessment, verified name and  as identifiers.  Provided introduction to self, and explanation of the Care Transition Nurse role.    Patient: Aki Payne    Patient : 1940   MRN: [id number]    Reason for Admission: Abnormal labs - sent per PCP  Discharge Date: 25  RURS: Readmission Risk Score: 14.4      Last Discharge Facility       Date Complaint Diagnosis Description Type Department Provider    25 Abnormal  labs Altered mental status, unspecified altered mental status type ... ED to Hosp-Admission (Discharged) (ADMITTED) [facility] Zackary Whitman MD; [place]...            Was this an external facility discharge? No    Additional needs identified to be addressed with provider   No needs identified             Method of communication with provider: none.    Patients top risk factors for readmission: medical condition-abnormal labs    Interventions to address risk factors:   Review of patient management of conditions/medications: abnormal labs    Care Summary Note: Jess states that Aki is doing \"better than he was in the hospital.\"   Jess states Aki is up and is able to walk on his own at home with minimal assistance.  She states his urostomy is draining fine. She states it is a \"little sluggish.\" She denies any odor, discoloration, or c/o pain.  Jess states [facility] is active - they were at the home today.  She states she met with [facility] today. They do NOT plan to enroll with Hospice at this time.  Jess denies Aki having a fever at this time.  She feels that his mental status is worse than his baseline at this time.  Jess states Aki's appetite is poor. He is eating and drinking with encouragement.  She denies

## 2025-07-31 LAB — BACTERIA UR CULT: NORMAL

## 2025-08-04 PROBLEM — Z66 DNR (DO NOT RESUSCITATE): Status: ACTIVE | Noted: 2025-08-04

## 2025-08-05 NOTE — PROGRESS NOTES
Physician Progress Note      PATIENT:               RAFAEL PENNINGTON  CSN #:                  [id number]  :                       1940  ADMIT DATE:       2025 12:08 PM  DISCH DATE:        2025 1:25 PM  RESPONDING  PROVIDER #:        Zackary Whitman MD          QUERY TEXT:    Sepsis is documented in the medical record . Please provide additional   clinical indicators supportive of your documentation. Or please document if   the diagnosis of sepsis has been ruled out after study.    The clinical indicators include:  H&P--\" Rafael Pennington is a 85 y.o. male with a history of bladder cx, CAD,   dementia, and HLD who presents to the hospital for abnormal labs and   somnolence. -AMS likely attributable to this as well as UTI as below.   #Sepsis-#UTI---hypothermic and tachypnic, meets sepsis criteria.\"    on admission--temp- 90.1 rectal,  wbc--5.8----- pulse 48, resp 18, bp 128/64,   la-1.0,  ua--pos nitrite, large LE, bact-+3, uc-Providencia rettgeri.    IV abx's, fluids, CT, cxray, labs, cultures, essential home meds, supportive   care  Options provided:  -- Sepsis present as evidenced by, Please document evidence.  -- Sepsis was ruled out after study  -- Other - I will add my own diagnosis  -- Disagree - Not applicable / Not valid  -- Disagree - Clinically unable to determine / Unknown  -- Refer to Clinical Documentation Reviewer    PROVIDER RESPONSE TEXT:    Sepsis was ruled out after study.    Query created by: Tacos Hemphill on 2025 1:51 PM      QUERY TEXT:    UTI is documented in the medical record .  Please clarify the relationship, if   any, with the UTI and urostomy:    The clinical indicators include:  H&P--\" Rafael Pennington is a 85 y.o. male with a history of bladder cx, CAD,   dementia, and HLD who presents to the hospital for abnormal labs and   somnolence. -AMS likely attributable to this as well as UTI as below.   #Sepsis-#UTI---hypothermic and tachypnic, meets sepsis

## 2025-08-08 ENCOUNTER — CARE COORDINATION (OUTPATIENT)
Dept: CARE COORDINATION | Age: 85
End: 2025-08-08

## 2025-08-15 ENCOUNTER — CARE COORDINATION (OUTPATIENT)
Dept: CARE COORDINATION | Age: 85
End: 2025-08-15

## 2025-08-19 PROBLEM — E86.0 DEHYDRATION: Status: RESOLVED | Noted: 2025-07-20 | Resolved: 2025-08-19

## 2025-08-21 ENCOUNTER — CARE COORDINATION (OUTPATIENT)
Dept: CARE COORDINATION | Age: 85
End: 2025-08-21

## 2025-08-26 ENCOUNTER — CARE COORDINATION (OUTPATIENT)
Dept: CARE COORDINATION | Age: 85
End: 2025-08-26

## (undated) DEVICE — SINGLE ACTION PUMPING SYSTEM

## (undated) DEVICE — SOLUTION SCRB 4OZ 10% POVIDONE IOD ANTIMIC BTL

## (undated) DEVICE — GUIDEWIRE HYDROPHILIC ZIP WIRE 0.025IN X 150CM

## (undated) DEVICE — SOLUTION IV IRRIG WATER 1000ML POUR BRL 2F7114

## (undated) DEVICE — CONTAINER,SPECIMEN,PNEU TUBE,3OZ,OR STRL: Brand: MEDLINE

## (undated) DEVICE — GAUZE,SPONGE,4"X4",16PLY,XRAY,STRL,LF: Brand: MEDLINE

## (undated) DEVICE — GLOVE ORANGE PI 8 1/2   MSG9085

## (undated) DEVICE — CYSTOSCOPY: Brand: MEDLINE INDUSTRIES, INC.

## (undated) DEVICE — SYRINGE MED 10ML LUERLOCK TIP W/O SFTY DISP

## (undated) DEVICE — GOWN SIRUS NONREIN XL W/TWL: Brand: MEDLINE INDUSTRIES, INC.